# Patient Record
Sex: FEMALE | Race: WHITE
[De-identification: names, ages, dates, MRNs, and addresses within clinical notes are randomized per-mention and may not be internally consistent; named-entity substitution may affect disease eponyms.]

---

## 2019-12-31 ENCOUNTER — HOSPITAL ENCOUNTER (EMERGENCY)
Dept: HOSPITAL 7 - FB.ED | Age: 84
Discharge: HOME | End: 2019-12-31
Payer: MEDICARE

## 2019-12-31 DIAGNOSIS — R79.1: ICD-10-CM
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
## 2019-12-31 DIAGNOSIS — N28.9: ICD-10-CM

## 2019-12-31 DIAGNOSIS — S29.011A: Primary | ICD-10-CM

## 2019-12-31 DIAGNOSIS — Z79.899: ICD-10-CM

## 2019-12-31 DIAGNOSIS — R79.89: ICD-10-CM

## 2019-12-31 DIAGNOSIS — R79.82: ICD-10-CM

## 2019-12-31 NOTE — EDM.PDOC
ED HPI GENERAL MEDICAL PROBLEM





- General


Stated Complaint: POSS PNEUMONIA


Time Seen by Provider: 12/31/19 13:14


Source of Information: Reports: Patient


History Limitations: Reports: No Limitations





- History of Present Illness


INITIAL COMMENTS - FREE TEXT/NARRATIVE: 





c/o L rib pain





pt with nonproductive cough x 2d, no fever, had L rib pain today, thinks she 

may have pneumonia





h/o pneumonia x 5, last time 3y ago





visiting RN came to house today and heard crackles at [place] (none here) and 

recommended she come to ED, no temp at home





pt says she has "serious" heart problems, cannot name them, says she is on 4 

cardiac meds





does not get flu vax, never had Pneumovax





never smoked,  a smoker





lives alone, , in [address], here with dtr


  ** L lat chest, rib/back


Pain Score (Numeric/FACES): 4





- Related Data


 Allergies











Allergy/AdvReac Type Severity Reaction Status Date / Time


 


No Known Allergies Allergy   Verified 08/03/17 10:17











Home Meds: 


 Home Meds





Acetaminophen [Tylenol Extra Strength] 500 mg PO BID 12/31/19 [History]


Ascorbic Acid [Vitamin C] 1,000 mg PO BEDTIME 12/31/19 [History]


Cholecalciferol (Vitamin D3) [Vitamin D3] 400 unit PO BEDTIME 12/31/19 [History]


Clindamycin HCl [Cleocin] 600 mg PO ASDIRECTED PRN 12/31/19 [History]


Docusate Sodium 100 mg PO BID PRN 12/31/19 [History]


Fluocinolone Acetonide 4 drop TOP ASDIRECTED PRN 12/31/19 [History]


Furosemide [Lasix] 40 mg PO DAILY 12/31/19 [History]


Isosorbide Mononitrate [Imdur] 30 mg PO BID 12/31/19 [History]


Lisinopril [Zestril] 10 mg PO DAILY 12/31/19 [History]


Metoprolol Succinate [Toprol XL 100mg] 100 mg PO BEDTIME 12/31/19 [History]


Multivitamin [Daily Daniel] 1 tab PO DAILY 12/31/19 [History]


Nitrofurantoin Macrocrystal [Macrodantin] 50 mg PO MOWEFR@2100 12/31/19 [History

]


Nitroglycerin 0.4 mg SL Q5M PRN 12/31/19 [History]


Peppermint Oil Cap 1 cap PO BEDTIME 12/31/19 [History]


Spironolactone [Aldactone] 12.5 mg PO DAILY 12/31/19 [History]


Triamcinolone Acetonide [Triamcinolone Acetonide 0.1% Crm] 1 applic TOP BID PRN 

12/31/19 [History]


Ubidecarenone [Coenzyme Q10] 100 mg PO QPM 12/31/19 [History]


Warfarin [Coumadin] 2.5 mg PO FR 12/31/19 [History]


Warfarin [Coumadin] 5 mg PO SUMOTUWETHSA 12/31/19 [History]











ED ROS GENERAL





- Review of Systems


Review Of Systems: See Below


Constitutional: Reports: No Symptoms


HEENT: Reports: No Symptoms


Respiratory: Reports: Shortness of Breath, Cough


Cardiovascular: Reports: Other (L rib pain)


Endocrine: Reports: No Symptoms


GI/Abdominal: Reports: No Symptoms


: Reports: No Symptoms


Musculoskeletal: Reports: No Symptoms


Skin: Reports: No Symptoms


Neurological: Reports: No Symptoms


Psychiatric: Reports: No Symptoms


Hematologic/Lymphatic: Reports: No Symptoms


Immunologic: Reports: No Symptoms





ED EXAM, GENERAL





- Physical Exam


Exam: See Below


Exam Limited By: No Limitations


General Appearance: Alert, WD/WN, No Apparent Distress, Other (alert, conversant

, talkative, nonill, no cough, no dyspnea)


Eye Exam: Bilateral Eye: EOMI


Ears: Normal External Exam, Hearing Grossly Normal


Nose: Normal Inspection, Normal Mucosa, No Blood


Throat/Mouth: Normal Inspection, Normal Lips, Normal Voice, No Airway Compromise


Head: Atraumatic, Normocephalic


Neck: Normal Inspection, Supple, Non-Tender, Full Range of Motion.  No: 

Lymphadenopathy (R), Lymphadenopathy (L)


Respiratory/Chest: No Respiratory Distress, Lungs Clear, Normal Breath Sounds, 

No Accessory Muscle Use, Chest Non-Tender, Other (no definite tender at rib 10 

in L AAL where pt indicates discomfort, no rales there either)


Cardiovascular: Normal Peripheral Pulses, Regular Rate, Rhythm, No Edema, No 

Gallop, No JVD, No Murmur, No Rub, JVD


GI/Abdominal: Normal Bowel Sounds, Soft, Non-Tender, No Distention


Back Exam: Normal Inspection, Full Range of Motion.  No: CVA Tenderness (R), 

CVA Tenderness (L)


Extremities: Normal Inspection, Normal Range of Motion, Non-Tender, No Pedal 

Edema, Normal Capillary Refill


Neurological: Alert, Oriented, CN II-XII Intact, Normal Cognition, No Motor/

Sensory Deficits


Psychiatric: Normal Affect, Normal Mood


Skin Exam: Warm, Dry, Intact, Normal Color, No Rash


Lymphatic: No Adenopathy





Course





- Vital Signs


Last Recorded V/S: 


 Last Vital Signs











Temp  36.6 C   12/31/19 15:20


 


Pulse  87   12/31/19 15:20


 


Resp  18   12/31/19 15:20


 


BP  121/76   12/31/19 15:20


 


Pulse Ox  98   12/31/19 15:20














- Orders/Labs/Meds


Orders: 


 Active Orders 24 hr











 Category Date Time Status


 


 EKG Documentation Completion [RC] ASDIRECTED Care  12/31/19 13:12 Active


 


 Abdomen 2V AP Flat Upright [CR] Stat Exams  12/31/19 13:10 Taken


 


 Chest 2V [CR] Stat Exams  12/31/19 13:10 Taken


 


 EKG 12 Lead [EK] Routine Ther  12/31/19 13:10 Ordered











Labs: 


 Laboratory Tests











  12/31/19 12/31/19 12/31/19 Range/Units





  13:25 13:25 13:25 


 


WBC  5.2    (4.5-12.0)  X10-3/uL


 


RBC  3.91    (3.23-5.20)  x10(6)uL


 


Hgb  12.6    (11.5-15.5)  g/dL


 


Hct  37.1    (30.0-51.3)  %


 


MCV  95.0    (80-96)  fL


 


MCH  32.2    (27.7-33.6)  pg


 


MCHC  33.8    (32.2-35.4)  g/dL


 


RDW  12.0    (11.5-15.5)  %


 


Plt Count  170    (125-369)  X10(3)uL


 


MPV  9.6    (7.4-10.4)  fL


 


Neut % (Auto)  57.9    (46-82)  %


 


Lymph % (Auto)  25.3    (13-37)  %


 


Mono % (Auto)  11.1    (4-12)  %


 


Eos % (Auto)  4    (1.0-5.0)  %


 


Baso % (Auto)  2    (0-2)  %


 


Neut # (Auto)  3.0    (1.6-8.3)  #


 


Lymph # (Auto)  1.3    (0.6-5.0)  #


 


Mono # (Auto)  0.6    (0.0-1.3)  #


 


Eos # (Auto)  0.2    (0.0-0.8)  #


 


Baso # (Auto)  0.1    (0.0-0.2)  #


 


PT     (8.7-11.1)  


 


INR     (0.89-1.13)  


 


Sodium   131 L   (135-145)  mmol/L


 


Potassium   5.5 H   (3.5-5.3)  mmol/L


 


Chloride   92 L D   (100-110)  mmol/L


 


Carbon Dioxide   33 H   (21-32)  mmol/L


 


BUN   36 H D   (7-18)  mg/dL


 


Creatinine   1.5 H   (0.55-1.02)  mg/dL


 


Est Cr Clr Drug Dosing   TNP   


 


Estimated GFR (MDRD)   33 L   (>60)  


 


BUN/Creatinine Ratio   24.0 H   (9-20)  


 


Glucose   170 H   ()  mg/dL


 


Calcium   9.4   (8.6-10.2)  mg/dL


 


Total Bilirubin   0.4   (0.1-1.3)  mg/dL


 


AST   17   (5-25)  IU/L


 


ALT   17   (12-36)  U/L


 


Alkaline Phosphatase   56   ()  IU/L


 


Troponin I    < 0.017 L  (<0.017-0.056)  ng/mL


 


C-Reactive Protein    2.4 H  (0.5-0.9)  mg/dL


 


NT-Pro-B Natriuret Pep    3894 H*  (<=450)  pg/mL


 


Total Protein   7.5   (6.0-8.0)  g/dL


 


Albumin   3.7   (3.2-4.6)  g/dL


 


Globulin   3.8   g/dL


 


Albumin/Globulin Ratio   1.0   


 


Lipase     ()  U/L


 


Urine Color     (YELLOW)  


 


Urine Appearance     (CLEAR)  


 


Urine pH     (5.0-6.5)  


 


Ur Specific Gravity     (1.010-1.025)  


 


Urine Protein     (NEGATIVE)  mg/dL


 


Urine Glucose (UA)     (NORMAL)  mg/dL


 


Urine Ketones     (NEGATIVE)  mg/dL


 


Urine Occult Blood     (NEGATIVE)  


 


Urine Nitrite     (NEGATIVE)  


 


Urine Bilirubin     (NEGATIVE)  


 


Urine Urobilinogen     (NEGATIVE)  mg/dL


 


Ur Leukocyte Esterase     (NEGATIVE)  


 


Urine RBC     (0-5)  


 


Urine WBC     (0-5)  


 


Ur Squamous Epith Cells     (NS,R,O)  


 


Urine Bacteria     (NS)  














  12/31/19 12/31/19 12/31/19 Range/Units





  13:25 13:25 15:12 


 


WBC     (4.5-12.0)  X10-3/uL


 


RBC     (3.23-5.20)  x10(6)uL


 


Hgb     (11.5-15.5)  g/dL


 


Hct     (30.0-51.3)  %


 


MCV     (80-96)  fL


 


MCH     (27.7-33.6)  pg


 


MCHC     (32.2-35.4)  g/dL


 


RDW     (11.5-15.5)  %


 


Plt Count     (125-369)  X10(3)uL


 


MPV     (7.4-10.4)  fL


 


Neut % (Auto)     (46-82)  %


 


Lymph % (Auto)     (13-37)  %


 


Mono % (Auto)     (4-12)  %


 


Eos % (Auto)     (1.0-5.0)  %


 


Baso % (Auto)     (0-2)  %


 


Neut # (Auto)     (1.6-8.3)  #


 


Lymph # (Auto)     (0.6-5.0)  #


 


Mono # (Auto)     (0.0-1.3)  #


 


Eos # (Auto)     (0.0-0.8)  #


 


Baso # (Auto)     (0.0-0.2)  #


 


PT   51.5 H*   (8.7-11.1)  


 


INR   5.40 H*   (0.89-1.13)  


 


Sodium     (135-145)  mmol/L


 


Potassium     (3.5-5.3)  mmol/L


 


Chloride     (100-110)  mmol/L


 


Carbon Dioxide     (21-32)  mmol/L


 


BUN     (7-18)  mg/dL


 


Creatinine     (0.55-1.02)  mg/dL


 


Est Cr Clr Drug Dosing     


 


Estimated GFR (MDRD)     (>60)  


 


BUN/Creatinine Ratio     (9-20)  


 


Glucose     ()  mg/dL


 


Calcium     (8.6-10.2)  mg/dL


 


Total Bilirubin     (0.1-1.3)  mg/dL


 


AST     (5-25)  IU/L


 


ALT     (12-36)  U/L


 


Alkaline Phosphatase     ()  IU/L


 


Troponin I     (<0.017-0.056)  ng/mL


 


C-Reactive Protein     (0.5-0.9)  mg/dL


 


NT-Pro-B Natriuret Pep     (<=450)  pg/mL


 


Total Protein     (6.0-8.0)  g/dL


 


Albumin     (3.2-4.6)  g/dL


 


Globulin     g/dL


 


Albumin/Globulin Ratio     


 


Lipase  243    ()  U/L


 


Urine Color    Yellow  (YELLOW)  


 


Urine Appearance    Clear  (CLEAR)  


 


Urine pH    7.0 H  (5.0-6.5)  


 


Ur Specific Gravity    1.010  (1.010-1.025)  


 


Urine Protein    Negative  (NEGATIVE)  mg/dL


 


Urine Glucose (UA)    Normal  (NORMAL)  mg/dL


 


Urine Ketones    Negative  (NEGATIVE)  mg/dL


 


Urine Occult Blood    Negative  (NEGATIVE)  


 


Urine Nitrite    Negative  (NEGATIVE)  


 


Urine Bilirubin    Negative  (NEGATIVE)  


 


Urine Urobilinogen    Normal  (NEGATIVE)  mg/dL


 


Ur Leukocyte Esterase    Negative  (NEGATIVE)  


 


Urine RBC    0-5  (0-5)  


 


Urine WBC    0-5  (0-5)  


 


Ur Squamous Epith Cells    Few H  (NS,R,O)  


 


Urine Bacteria    Rare H  (NS)  














- Re-Assessments/Exams


Free Text/Narrative Re-Assessment/Exam: 





12/31/19 16:31


exam unremarkable, pt with new inc'd BUN/creat c/w dehydration, however pt 

states her weight has not changed (which is somewhat doubtful where this is true

)





on 2 diuretics, will stop the furosemide for 2d and continue the spironolactone





also on lisinopril which may need to be adjusted altho further adjustments in 

diuretics may be sufficient pending recommendations of PCP





PCP Dr Whitney is out of office for 5d, d/w Dr Harley who left a note on the 

desk of Dr Devonte Munoz RN offered to make appointment with Dr Whitney in 5-6d, however pt declined 

saying "I just call him up whenever I want"





I offered a cough suppressant, however pt declined that as well





mild increase of CRP of uncertain clinical sig, no clinical evidence of 

infection





Departure





- Departure


Time of Disposition: 16:23


Disposition: Home, Self-Care 01


Condition: Good


Clinical Impression: 


 Chest wall muscle strain, Acute renal insufficiency, Elevated brain 

natriuretic peptide (BNP) level, Elevated INR, Elevated C-reactive protein








- Discharge Information


*PRESCRIPTION DRUG MONITORING PROGRAM REVIEWED*: Not Applicable


*COPY OF PRESCRIPTION DRUG MONITORING REPORT IN PATIENT RANJIT: Not Applicable


Referrals: 


Goldy Whitney MD [Primary Care Provider] - 


Additional Instructions: 


Stop the furosemide for 2 days.





Use heat for 10 minutes to chest wall every 2 hours as needed while awake.





Take acetaminophen 500 mg 2 tabs 4 times a day for 5 days.





See Dr Whitney in 5-6 days.





Return to Emergency Department if feeling worse.





Sepsis Event Note





- Focused Exam


Vital Signs: 


 Vital Signs











  Temp Pulse Resp BP Pulse Ox


 


 12/31/19 15:20  36.6 C  87  18  121/76  98


 


 12/31/19 14:00   79  20  139/95 H  97


 


 12/31/19 13:30  36.3 C  83  24 H  152/109 H  100











Date Exam was Performed: 12/31/19


Time Exam was Performed: 16:23





- My Orders


Last 24 Hours: 


My Active Orders





12/31/19 13:10


Abdomen 2V AP Flat Upright [CR] Stat 


Chest 2V [CR] Stat 


EKG 12 Lead [EK] Routine 





12/31/19 13:12


EKG Documentation Completion [RC] ASDIRECTED 














- Assessment/Plan


Last 24 Hours: 


My Active Orders





12/31/19 13:10


Abdomen 2V AP Flat Upright [CR] Stat 


Chest 2V [CR] Stat 


EKG 12 Lead [EK] Routine 





12/31/19 13:12


EKG Documentation Completion [RC] ASDIRECTED

## 2020-08-12 ENCOUNTER — HOSPITAL ENCOUNTER (INPATIENT)
Dept: HOSPITAL 7 - FB.ED | Age: 85
LOS: 5 days | Discharge: NURSING, REHAB OR LONG-TERM CARE | DRG: 391 | End: 2020-08-17
Attending: FAMILY MEDICINE | Admitting: FAMILY MEDICINE
Payer: MEDICARE

## 2020-08-12 DIAGNOSIS — Z88.0: ICD-10-CM

## 2020-08-12 DIAGNOSIS — K57.92: ICD-10-CM

## 2020-08-12 DIAGNOSIS — F41.9: ICD-10-CM

## 2020-08-12 DIAGNOSIS — I50.23: ICD-10-CM

## 2020-08-12 DIAGNOSIS — Z98.42: ICD-10-CM

## 2020-08-12 DIAGNOSIS — Z79.01: ICD-10-CM

## 2020-08-12 DIAGNOSIS — Z90.710: ICD-10-CM

## 2020-08-12 DIAGNOSIS — I48.91: ICD-10-CM

## 2020-08-12 DIAGNOSIS — Z79.899: ICD-10-CM

## 2020-08-12 DIAGNOSIS — R07.9: ICD-10-CM

## 2020-08-12 DIAGNOSIS — N28.9: ICD-10-CM

## 2020-08-12 DIAGNOSIS — K59.00: ICD-10-CM

## 2020-08-12 DIAGNOSIS — Z98.41: ICD-10-CM

## 2020-08-12 DIAGNOSIS — Z95.5: ICD-10-CM

## 2020-08-12 DIAGNOSIS — H35.30: ICD-10-CM

## 2020-08-12 DIAGNOSIS — M19.90: ICD-10-CM

## 2020-08-12 DIAGNOSIS — F32.9: ICD-10-CM

## 2020-08-12 DIAGNOSIS — G20: ICD-10-CM

## 2020-08-12 DIAGNOSIS — Z88.2: ICD-10-CM

## 2020-08-12 DIAGNOSIS — E87.6: ICD-10-CM

## 2020-08-12 DIAGNOSIS — I20.9: ICD-10-CM

## 2020-08-12 DIAGNOSIS — Z87.01: ICD-10-CM

## 2020-08-12 DIAGNOSIS — I25.5: ICD-10-CM

## 2020-08-12 DIAGNOSIS — E66.9: ICD-10-CM

## 2020-08-12 DIAGNOSIS — H40.9: ICD-10-CM

## 2020-08-12 DIAGNOSIS — I69.354: ICD-10-CM

## 2020-08-12 DIAGNOSIS — I11.0: ICD-10-CM

## 2020-08-12 DIAGNOSIS — I25.2: ICD-10-CM

## 2020-08-12 DIAGNOSIS — E11.9: ICD-10-CM

## 2020-08-12 DIAGNOSIS — I50.9: ICD-10-CM

## 2020-08-12 DIAGNOSIS — K57.32: Primary | ICD-10-CM

## 2020-08-12 PROCEDURE — 36415 COLL VENOUS BLD VENIPUNCTURE: CPT

## 2020-08-12 PROCEDURE — 74176 CT ABD & PELVIS W/O CONTRAST: CPT

## 2020-08-12 PROCEDURE — 93005 ELECTROCARDIOGRAM TRACING: CPT

## 2020-08-12 PROCEDURE — 96375 TX/PRO/DX INJ NEW DRUG ADDON: CPT

## 2020-08-12 PROCEDURE — 81001 URINALYSIS AUTO W/SCOPE: CPT

## 2020-08-12 PROCEDURE — 85610 PROTHROMBIN TIME: CPT

## 2020-08-12 PROCEDURE — 80053 COMPREHEN METABOLIC PANEL: CPT

## 2020-08-12 PROCEDURE — 83880 ASSAY OF NATRIURETIC PEPTIDE: CPT

## 2020-08-12 PROCEDURE — 99285 EMERGENCY DEPT VISIT HI MDM: CPT

## 2020-08-12 PROCEDURE — 96374 THER/PROPH/DIAG INJ IV PUSH: CPT

## 2020-08-12 PROCEDURE — 85025 COMPLETE CBC W/AUTO DIFF WBC: CPT

## 2020-08-12 PROCEDURE — 84484 ASSAY OF TROPONIN QUANT: CPT

## 2020-08-12 PROCEDURE — 71045 X-RAY EXAM CHEST 1 VIEW: CPT

## 2020-08-12 RX ADMIN — METOPROLOL SUCCINATE SCH MG: 100 TABLET, FILM COATED, EXTENDED RELEASE ORAL at 20:29

## 2020-08-12 RX ADMIN — METRONIDAZOLE SCH MLS/HR: 500 SOLUTION INTRAVENOUS at 19:19

## 2020-08-12 NOTE — CT
INDICATION:  Left lower and right lower quadrant pain.  



CT ABDOMEN AND PELVIS WITHOUT IV CONTRAST BUT WITH ORAL CONTRAST:  Spiral 3.75 
mm axial sections were obtained through the abdomen with oral contrast only 
08/12/20 and compared with 08/03/17 CT abdomen and pelvis with oral and IV 
contrast.  



The  heart is enlarged.  No pericardial effusion was seen.  



There are bilateral pleural effusions, large on the right, moderate on the left 
with heavy markings at the lung bases which may be on the basis of pneumonia and
pleuritis, but could be related to CHF with pleural effusions - correlate 
clinically.  



The liver and spleen were unremarkable.  

The gallbladder is absent compatible with history of its removal.  

The adrenal glands appeared grossly normal.  

The pancreas is very minimal in size as previously.  The common bile duct was 
not enlarged.  



Calcifications are noted in the abdominal aorta, splenic artery, iliac and 
femoral arteries.  



There is mild renal fascial thickening with some irregularity of the renal 
cortices compatible with renal cortical scarring.  No evidence of obstructive 
uropathy was identified.  No definite mass of the kidneys was identified.  



No retroperitoneal masses were demonstrated.  



The appendix and uterus are absent compatible with their resections.  



Diverticulosis of the descending, to a lesser extent transverse, and to a much 
greater extent sigmoid colon is noted without definitive evidence of 
diverticulitis.  



No evidence of free air or bowel obstruction was seen.  



The urinary bladder appeared grossly normal.  



There is again noted some mild thickening of the gastric antral wall of 
questionable significance.  



Small umbilical hernia including only fat is again noted.  



The patient's right and left lower abdominal pain is not definitely explained, 
although a mild or early diverticulitis is difficult to entirely exclude with 
this appearance.  



A mild-to-moderate dextroconcave scoliosis of the lumbar spine is again noted 
with hypertrophic degenerative changes and disk disease at L2-3, L4-5, L5-S1 
again noted.  There is some progression of hypertrophic changes at the L2-3 
level.  



IMPRESSION: 

1.  Diverticulosis coli most notable at the sigmoid, but without definite 
evidence of diverticulitis.  A mild or early diverticulitis is difficult to 
entirely exclude, especially in the area of the sigmoid colon.  

2.  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 

3.  ASD. 

4.  ASHD with possible CHF and bilateral pleural effusions, question bibasilar 
pneumonia and pleuritis additionally. 

5.  Post hysterectomy, appendectomy, cholecystectomy.  

6.  Continued appearance of thickening of the wall of the gastric antrum of 
questionable significance.  



Report was called to Dr. Khanna at 1515 hours.  



Total exam DLP was 1449.54 mGy-cm.  

MTDD

## 2020-08-12 NOTE — EDM.PDOC
ED HPI GENERAL MEDICAL PROBLEM





- General


Chief Complaint: Cardiovascular Problem


Time Seen by Provider: 20 14:55


Source of Information: Reports: Patient


History Limitations: Reports: No Limitations





- History of Present Illness


INITIAL COMMENTS - FREE TEXT/NARRATIVE: 





Patient presented to the ED because of chest pain,nausea and palpitations for 

the past 4 days. She also c/o LLQ and RLQ pain,6/10, with  occasional nausea but

no vomiting and she is constipated for 2 days now. There is no cough/cold 

symptoms, no fever or chills. 


  ** left chest


Pain Score (Numeric/FACES): 6





  ** Abdomen


Pain Score (Numeric/FACES): 5





  ** Back


Pain Score (Numeric/FACES): 5





- Related Data


                                    Allergies











Allergy/AdvReac Type Severity Reaction Status Date / Time


 


Penicillins Allergy  Rash Verified 20 19:22


 


Sulfa (Sulfonamide Allergy  Rash Verified 20 19:22





Antibiotics)     











Home Meds: 


                                    Home Meds





Acetaminophen [Tylenol Extra Strength] 500 mg PO BID 19 [History]


Docusate Sodium 100 mg PO BID PRN 19 [History]


Fluocinolone Acetonide 4 drop TOP ASDIRECTED PRN 19 [History]


Furosemide [Lasix] 40 mg PO DAILY 19 [History]


Isosorbide Mononitrate [Imdur] 90 mg PO QAM 19 [History]


Metoprolol Succinate [Toprol XL 100mg] 100 mg PO BEDTIME 19 [History]


Multivitamin [Daily Daniel] 1 tab PO DAILY 19 [History]


Nitroglycerin 0.4 mg SL Q5M PRN 19 [History]


Peppermint Oil Cap 1 cap PO BEDTIME 19 [History]


Triamcinolone Acetonide [Triamcinolone Acetonide 0.1% Crm] 1 applic TOP BID PRN 

19 [History]


Ubidecarenone [Coenzyme Q10] 100 mg PO DAILY 19 [History]


Warfarin [Coumadin] 5 mg PO QAM 19 [History]


lisinopriL [Zestril] 10 mg PO DAILY 19 [History]


nitrofurantoin macrocrystaL [Macrodantin] 50 mg PO MOWEFR@2100 12/31/19 

[History]


Mv-Mn/Iron/Folic Acid/Herb 190 [Vitamin D3 Complete Caplet] 1 tab PO DAILY 

20 [History]











Past Medical History


HEENT History: Reports: Glaucoma, Macular Degeneration


Cardiovascular History: Reports: Afib, Heart Failure, Hypertension, MI, Stents


Respiratory History: Reports: Pneumonia, Recurrent


Gastrointestinal History: Reports: Diverticulosis


Genitourinary History: Reports: None


OB/GYN History: Reports: Pregnancy


Other OB/GYN History: 


Musculoskeletal History: Reports: Arthritis


Neurological History: Reports: Concussion, CVA, Migraines, Parkinson's, Vertigo


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


Psychiatric History: Reports: Anxiety, Depression


Endocrine/Metabolic History: Reports: Diabetes, Type II, Obesity/BMI 30+


Hematologic History: Reports: Anticoagulation Therapy





- Infectious Disease History


Infectious Disease History: Reports: Shingles





- Past Surgical History


HEENT Surgical History: Reports: Cataract Surgery


Other HEENT Surgeries/Procedures: bilat cataract


Cardiovascular Surgical History: Reports: Coronary Artery Stent


GI Surgical History: Reports: Colonoscopy, Other (See Below)


Other GI Surgeries/Procedures: exp lap


Female  Surgical History: Reports: Hysterectomy





Social & Family History





- Family History


Family Medical History: Noncontributory





- Caffeine Use


Caffeine Use: Reports: Coffee, Soda





ED ROS GENERAL





- Review of Systems


Review Of Systems: See Below


Constitutional: Reports: No Symptoms


HEENT: Reports: No Symptoms


Respiratory: Reports: No Symptoms


Cardiovascular: Reports: Chest Pain, Palpitations.  Denies: Lightheadedness, 

Orthopnea


Endocrine: Reports: No Symptoms


GI/Abdominal: Reports: Constipation, Nausea.  Denies: Diarrhea, Vomiting


: Reports: No Symptoms


Musculoskeletal: Reports: No Symptoms


Skin: Reports: No Symptoms


Neurological: Reports: No Symptoms


Psychiatric: Reports: No Symptoms





ED EXAM, GENERAL





- Physical Exam


Exam: See Below


Exam Limited By: No Limitations


General Appearance: Alert, No Apparent Distress


Eye Exam: Bilateral Eye: PERRL


Ears: Normal External Exam


Nose: Normal Inspection


Throat/Mouth: Normal Inspection


Head: Atraumatic, Normocephalic


Neck: Normal Inspection, Supple, Non-Tender


Respiratory/Chest: No Respiratory Distress, Lungs Clear, Normal Breath Sounds


Cardiovascular: Normal Peripheral Pulses, Regular Rate, Rhythm, No Edema, No 

Gallop, No JVD, No Murmur


Back Exam: Normal Inspection, Full Range of Motion


Extremities: Normal Inspection, Normal Range of Motion, Non-Tender


Neurological: Alert, Oriented, CN II-XII Intact, Normal Cognition, Normal Gait, 

Normal Reflexes, No Motor/Sensory Deficits


Psychiatric: Normal Affect, Normal Mood





Course





- Vital Signs


Text/Narrative:: 





Labs/EKG/CXR/Ct abs/pelvis was discussed with patient and verbalized full 

understanding


EKG-AFIB/A FLUTTER with RVR


Cardizem 25 mg IV x1


Morphine 2 mg IV x1


Lasix 40 mg IV


Zaroxolyn 2.5 mg po x1


Rocephine 1 gm IV x1


Flagyl 500 mg IV x1


Last Recorded V/S: 


                                Last Vital Signs











Temp  36.4 C   20 00:00


 


Pulse  60   20 00:00


 


Resp  18   20 00:00


 


BP  135/87   20 00:00


 


Pulse Ox  99   20 00:00














- Orders/Labs/Meds


Orders: 


                               Active Orders 24 hr











 Category Date Time Status


 


 Patient Status [ADT] Routine ADT  20 18:05 Active


 


 Cardiac Monitoring [RC] CONTINUOUS Care  20 18:11 Active


 


 Intake and Output [RC] 06,14,22 Care  20 18:11 Active


 


 Oxygen Therapy [RC] PRN Care  20 18:05 Active


 


 Pulse Oximetry [RC] CONTINUOUS Care  20 18:11 Active


 


 Vital Signs [RC] 00,04,08,12,16,20 Care  20 18:05 Active


 


 Heart Healthy Diet [DIET] Diet  20 Dinner Ordered


 


 BASIC METABOLIC PANEL,BMP [CHEM] AM Lab  20 05:11 Ordered


 


 CBC WITH AUTO DIFF [HEME] AM Lab  20 05:11 Ordered


 


 INR,PT,PROTHROMBIN TIME [COAG] AM Lab  20 05:11 Ordered


 


 Ondansetron [Zofran] Med  20 18:04 Active





 4 mg IV Q4H PRN   


 


 Sodium Chloride 0.9% [Saline Flush] Med  20 13:13 Active





 10 ml FLUSH ASDIRECTED PRN   


 


 bisacodyL [Dulcolax] Med  20 18:04 Active





 5 mg PO DAILY PRN   


 


 Saline Lock Insert [OM.PC] Routine Oth  20 13:13 Ordered


 


 Resuscitation Status Routine Resus Stat  20 18:04 Ordered


 


 EKG 12 Lead [EK] Routine Ther  20 12:19 Ordered








                                Medication Orders





Acetaminophen (Tylenol Extra Strength)  500 mg PO BID [facility]


   Last Admin: 20 20:29  Dose: 500 mg


   Documented by: MAGDA


Ascorbic Acid (Vitamin C)  1,000 mg PO BEDTIME [facility]


   Last Admin: 20 20:28  Dose: 1,000 mg


   Documented by: MAGDA


Bisacodyl (Dulcolax)  5 mg PO DAILY PRN


   PRN Reason: Constipation


Ceftriaxone Sodium (Rocephin)  1 gm IVPUSH Q24H [facility]


   Last Admin: 20 19:19  Dose: 1 gm


   Documented by: KOBE


Coenzyme Q10 (Coenzyme Q10)  100 mg PO QPM KWABENA


Docusate Sodium (Colace)  100 mg PO BID PRN


   PRN Reason: Constipation


Furosemide (Lasix)  40 mg IVPUSH BID [facility]


   Last Admin: 20 19:19  Dose: 40 mg


   Documented by: KOBE


Metronidazole 500 mg/ Premix  100 mls @ 100 mls/hr IV Q8H [facility]


   Last Admin: 20 19:19  Dose: 100 mls/hr


   Documented by: KOBE


Isosorbide Mononitrate (Imdur)  30 mg PO BID [facility]


Metoprolol Succinate (Toprol Xl)  100 mg PO BEDTIME [facility]


   Last Admin: 20 20:29  Dose: 100 mg


   Documented by: MAGDA


Multivitamins/Minerals/Vitamin C (Tab-A-Daniel)  1 tab PO DAILY [facility]


Nitroglycerin (Nitrostat)  0.4 mg SL Q5M PRN


   PRN Reason: Chest Pain


Non-Formulary Medication (Cholecalciferol (Vitamin D3) [Vitamin D3])  400 unit 

PO BEDTIME KWABENA


Non-Formulary Medication (Fluocinolone Acetonide [Fluocinolone Acetonide])  4 

drop TOP ASDIRECTED PRN


   PRN Reason: Dizziness


Non-Formulary Medication (Peppermint Oil Cap)  1 cap PO BEDTIME [facility]


Ondansetron HCl (Zofran)  4 mg IV Q4H PRN


   PRN Reason: Nausea/Vomiting


Sodium Chloride (Saline Flush)  10 ml FLUSH ASDIRECTED PRN


   PRN Reason: Keep Vein Open


Spironolactone (Aldactone)  12.5 mg PO DAILY KWABENA


Triamcinolone Acetonide (Triamcinolone Acetonide 0.1% Crm)  0 gm TOP BID PRN


   PRN Reason: Rash


Warfarin Sodium (Coumadin)  2.5 mg PO FR KWABENA


Warfarin Sodium (Coumadin)  5 mg PO SUMOTUWETHSA [facility]








Labs: 


                                Laboratory Tests











  20 Range/Units





  12:30 12:30 12:30 


 


WBC  6.3    (4.5-12.0)  X10-3/uL


 


RBC  3.86    (3.23-5.20)  x10(6)uL


 


Hgb  12.2    (11.5-15.5)  g/dL


 


Hct  37.2    (30.0-51.3)  %


 


MCV  96.3 H    (80-96)  fL


 


MCH  31.6    (27.7-33.6)  pg


 


MCHC  32.8    (32.2-35.4)  g/dL


 


RDW  12.3    (11.5-15.5)  %


 


Plt Count  156    (125-369)  X10(3)uL


 


MPV  10.1    (7.4-10.4)  fL


 


Neut % (Auto)  72.4    (46-82)  %


 


Lymph % (Auto)  15.4    (13-37)  %


 


Mono % (Auto)  8.4    (4-12)  %


 


Eos % (Auto)  2    (1.0-5.0)  %


 


Baso % (Auto)  2    (0-2)  %


 


Neut # (Auto)  4.6    (1.6-8.3)  #


 


Lymph # (Auto)  1.0    (0.6-5.0)  #


 


Mono # (Auto)  0.5    (0.0-1.3)  #


 


Eos # (Auto)  0.1    (0.0-0.8)  #


 


Baso # (Auto)  0.1    (0.0-0.2)  #


 


PT   32.1 H   (9.0-11.1)  sec


 


INR   3.21 H   (1.00-1.24)  


 


Sodium    135  (135-145)  mmol/L


 


Potassium    4.5  D  (3.5-5.3)  mmol/L


 


Chloride    97 L D  (100-110)  mmol/L


 


Carbon Dioxide    28  (21-32)  mmol/L


 


BUN    15  D  (7-18)  mg/dL


 


Creatinine    1.3 H  (0.55-1.02)  mg/dL


 


Est Cr Clr Drug Dosing    TNP  


 


Estimated GFR (MDRD)    39 L  (>60)  


 


BUN/Creatinine Ratio    11.5  (9-20)  


 


Glucose    380 H D  ()  mg/dL


 


Calcium    8.7  (8.6-10.2)  mg/dL


 


Total Bilirubin    0.6  (0.1-1.3)  mg/dL


 


AST    21  D  (5-25)  IU/L


 


ALT    21  D  (12-36)  U/L


 


Alkaline Phosphatase    69  ()  IU/L


 


Troponin I     (4.0-60.3)  pg/mL


 


NT-Pro-B Natriuret Pep     (<=450)  pg/mL


 


Total Protein    7.2  (6.0-8.0)  g/dL


 


Albumin    3.5  (3.2-4.6)  g/dL


 


Globulin    3.7  g/dL


 


Albumin/Globulin Ratio    1.0  


 


Urine Color     (YELLOW)  


 


Urine Appearance     (CLEAR)  


 


Urine pH     (5.0-6.5)  


 


Ur Specific Gravity     (1.010-1.025)  


 


Urine Protein     (NEGATIVE)  mg/dL


 


Urine Glucose (UA)     (NORMAL)  mg/dL


 


Urine Ketones     (NEGATIVE)  mg/dL


 


Urine Occult Blood     (NEGATIVE)  


 


Urine Nitrite     (NEGATIVE)  


 


Urine Bilirubin     (NEGATIVE)  


 


Urine Urobilinogen     (NEGATIVE)  mg/dL


 


Ur Leukocyte Esterase     (NEGATIVE)  


 


Urine RBC     (0-5)  


 


Urine WBC     (0-5)  


 


Ur Squamous Epith Cells     (NS,R,O)  


 


Urine Bacteria     (NS)  














  20 Range/Units





  12:30 13:28 


 


WBC    (4.5-12.0)  X10-3/uL


 


RBC    (3.23-5.20)  x10(6)uL


 


Hgb    (11.5-15.5)  g/dL


 


Hct    (30.0-51.3)  %


 


MCV    (80-96)  fL


 


MCH    (27.7-33.6)  pg


 


MCHC    (32.2-35.4)  g/dL


 


RDW    (11.5-15.5)  %


 


Plt Count    (125-369)  X10(3)uL


 


MPV    (7.4-10.4)  fL


 


Neut % (Auto)    (46-82)  %


 


Lymph % (Auto)    (13-37)  %


 


Mono % (Auto)    (4-12)  %


 


Eos % (Auto)    (1.0-5.0)  %


 


Baso % (Auto)    (0-2)  %


 


Neut # (Auto)    (1.6-8.3)  #


 


Lymph # (Auto)    (0.6-5.0)  #


 


Mono # (Auto)    (0.0-1.3)  #


 


Eos # (Auto)    (0.0-0.8)  #


 


Baso # (Auto)    (0.0-0.2)  #


 


PT    (9.0-11.1)  sec


 


INR    (1.00-1.24)  


 


Sodium    (135-145)  mmol/L


 


Potassium    (3.5-5.3)  mmol/L


 


Chloride    (100-110)  mmol/L


 


Carbon Dioxide    (21-32)  mmol/L


 


BUN    (7-18)  mg/dL


 


Creatinine    (0.55-1.02)  mg/dL


 


Est Cr Clr Drug Dosing    


 


Estimated GFR (MDRD)    (>60)  


 


BUN/Creatinine Ratio    (9-20)  


 


Glucose    ()  mg/dL


 


Calcium    (8.6-10.2)  mg/dL


 


Total Bilirubin    (0.1-1.3)  mg/dL


 


AST    (5-25)  IU/L


 


ALT    (12-36)  U/L


 


Alkaline Phosphatase    ()  IU/L


 


Troponin I  28.7   (4.0-60.3)  pg/mL


 


NT-Pro-B Natriuret Pep  7980 H*   (<=450)  pg/mL


 


Total Protein    (6.0-8.0)  g/dL


 


Albumin    (3.2-4.6)  g/dL


 


Globulin    g/dL


 


Albumin/Globulin Ratio    


 


Urine Color   Yellow  (YELLOW)  


 


Urine Appearance   Clear  (CLEAR)  


 


Urine pH   5.0  (5.0-6.5)  


 


Ur Specific Gravity   1.015  (1.010-1.025)  


 


Urine Protein   Negative  (NEGATIVE)  mg/dL


 


Urine Glucose (UA)   >1000 H  (NORMAL)  mg/dL


 


Urine Ketones   Negative  (NEGATIVE)  mg/dL


 


Urine Occult Blood   Trace  (NEGATIVE)  


 


Urine Nitrite   Negative  (NEGATIVE)  


 


Urine Bilirubin   Negative  (NEGATIVE)  


 


Urine Urobilinogen   Normal  (NEGATIVE)  mg/dL


 


Ur Leukocyte Esterase   Negative  (NEGATIVE)  


 


Urine RBC   0-5  (0-5)  


 


Urine WBC   0-5  (0-5)  


 


Ur Squamous Epith Cells   Moderate H  (NS,R,O)  


 


Urine Bacteria   Moderate H  (NS)  











Meds: 


Medications











Generic Name Dose Route Start Last Admin





  Trade Name Freq  PRN Reason Stop Dose Admin


 


Acetaminophen  500 mg  20 21:00  20 20:29





  Tylenol Extra Strength  PO   500 mg





  BID KWABENA   Administration


 


Ascorbic Acid  1,000 mg  20 21:00  20 20:28





  Vitamin C  PO   1,000 mg





  BEDTIME KWABENA   Administration


 


Bisacodyl  5 mg  20 18:04 





  Dulcolax  PO  





  DAILY PRN  





  Constipation  


 


Ceftriaxone Sodium  1 gm  20 18:30  20 19:19





  Rocephin  IVPUSH   1 gm





  Q24H KWABENA   Administration


 


Coenzyme Q10  100 mg  20 17:00 





  Coenzyme Q10  PO  





  QPM KWABENA  


 


Docusate Sodium  100 mg  20 18:15 





  Colace  PO  





  BID PRN  





  Constipation  


 


Furosemide  40 mg  20 21:00  20 19:19





  Lasix  IVPUSH   40 mg





  BID [facility]   Administration


 


Metronidazole 500 mg/ Premix  100 mls @ 100 mls/hr  20 18:30  20 

19:19





  IV   100 mls/hr





  Q8H KWABENA   Administration


 


Isosorbide Mononitrate  30 mg  20 21:00 





  Imdur  PO  





  BID [facility]  


 


Metoprolol Succinate  100 mg  20 21:00  20 20:29





  Toprol Xl  PO   100 mg





  BEDTIME KWABENA   Administration


 


Multivitamins/Minerals/Vitamin C  1 tab  20 09:00 





  Tab-A-Daniel  PO  





  DAILY [facility]  


 


Nitroglycerin  0.4 mg  20 18:15 





  Nitrostat  SL  





  Q5M PRN  





  Chest Pain  


 


Non-Formulary Medication  400 unit  20 21:00 





  Cholecalciferol (Vitamin D3) [Vitamin D3]  PO  





  BEDTIME KWABENA  


 


Non-Formulary Medication  4 drop  20 18:15 





  Fluocinolone Acetonide [Fluocinolone Acetonide]  TOP  





  ASDIRECTED PRN  





  Dizziness  


 


Non-Formulary Medication  1 cap  20 21:00 





  Peppermint Oil Cap  PO  





  BEDTIME [facility]  


 


Ondansetron HCl  4 mg  20 18:04 





  Zofran  IV  





  Q4H PRN  





  Nausea/Vomiting  


 


Sodium Chloride  10 ml  20 13:13 





  Saline Flush  FLUSH  





  ASDIRECTED PRN  





  Keep Vein Open  


 


Spironolactone  12.5 mg  20 09:00 





  Aldactone  PO  





  DAILY KWABENA  


 


Triamcinolone Acetonide  0 gm  20 18:15 





  Triamcinolone Acetonide 0.1% Crm  TOP  





  BID PRN  





  Rash  


 


Warfarin Sodium  2.5 mg  20 18:15 





  Coumadin  PO  





  FR KWABENA  


 


Warfarin Sodium  5 mg  20 18:15 





  Coumadin  PO  





  SUMOTUWETHSA KWABENA  














Discontinued Medications














Generic Name Dose Route Start Last Admin





  Trade Name Freq  PRN Reason Stop Dose Admin


 


Aspirin  324 mg  20 14:25  20 14:54





  Aspirin  PO  20 14:26  324 mg





  ONETIME ONE   Administration


 


Diatrizoate Meglum/Diatrizoate Sod  30 ml  20 15:41  20 22:37





  Gastrografin 37%  PO  20 15:42  Not Given





  .AS DIRECTED ONE  


 


Diltiazem HCl  25 mg  20 14:25  20 14:48





  Diltiazem  IVPUSH  20 14:26  25 mg





  ONETIME ONE   Administration


 


Metolazone  2.5 mg  20 18:20  20 20:33





  Zaroxolyn  PO  20 18:21  2.5 mg





  ONETIME ONE   Administration


 


Metolazone  Confirm  20 20:31  20 20:34





  Zaroxolyn  Administered  20 20:32  Not Given





  Dose  





  2.5 mg  





  .ROUTE  





  .STK-MED ONE  


 


Morphine Sulfate  2 mg  20 14:26  20 14:48





  Morphine  IVPUSH  20 14:27  2 mg





  ONETIME ONE   Administration














Departure





- Departure


Time of Disposition: 16:00


Disposition: Admitted As Inpatient 66


Condition: Good


Clinical Impression: 


 Chest pain, Afib, Diverticulitis








- My Orders


Last 24 Hours: 


My Active Orders





20 12:19


EKG 12 Lead [EK] Routine 





20 13:13


Sodium Chloride 0.9% [Saline Flush]   10 ml FLUSH ASDIRECTED PRN 


Saline Lock Insert [OM.PC] Routine 





20 Dinner


Heart Healthy Diet [DIET] 





20 18:04


Ondansetron [Zofran]   4 mg IV Q4H PRN 


bisacodyL [Dulcolax]   5 mg PO DAILY PRN 


Resuscitation Status Routine 





20 18:05


Patient Status [ADT] Routine 


Oxygen Therapy [RC] PRN 


Vital Signs [RC] 00,04,08,12,16,20 





20 18:11


Cardiac Monitoring [RC] CONTINUOUS 


Intake and Output [RC] 06,14,22 


Pulse Oximetry [RC] CONTINUOUS 





20 05:11


BASIC METABOLIC PANEL,BMP [CHEM] AM 


CBC WITH AUTO DIFF [HEME] AM 


INR,PT,PROTHROMBIN TIME [COAG] AM 














- Assessment/Plan


Last 24 Hours: 


My Active Orders





20 12:19


EKG 12 Lead [EK] Routine 





20 13:13


Sodium Chloride 0.9% [Saline Flush]   10 ml FLUSH ASDIRECTED PRN 


Saline Lock Insert [OM.PC] Routine 





20 Dinner


Heart Healthy Diet [DIET] 





20 18:04


Ondansetron [Zofran]   4 mg IV Q4H PRN 


bisacodyL [Dulcolax]   5 mg PO DAILY PRN 


Resuscitation Status Routine 





20 18:05


Patient Status [ADT] Routine 


Oxygen Therapy [RC] PRN 


Vital Signs [RC] 00,04,08,12,16,20 





20 18:11


Cardiac Monitoring [RC] CONTINUOUS 


Intake and Output [RC] 06,14,22 


Pulse Oximetry [RC] CONTINUOUS 





20 05:11


BASIC METABOLIC PANEL,BMP [CHEM] AM 


CBC WITH AUTO DIFF [HEME] AM 


INR,PT,PROTHROMBIN TIME [COAG] AM

## 2020-08-12 NOTE — CR
INDICATION: Chest pain, Parkinson's, shortness of breath. 



CHEST ONE VIEW: A single portable AP upright view of the chest was obtained 
08/12/20 and compared with 03/08/11 and 12/31/19. 



The heart is enlarged, the aorta is tortuous with calcification. Overlying EKG 
leads and snaps are noted. Pulmonary vasculature appears slightly prominent 
raising question of a mild degree of CHF. 



Heavy markings at the lung bases due to a poor inspiration, at least in part, 
make it difficult to exclude minimal patchy bronchopneumonia. However, no 
consolidating pneumonia or definite effusion was seen. 



IMPRESSION:

1. ASHD with cardiomegaly with suggestion of mild or early CHF. 

2. Difficult to entirely exclude patchy bronchopneumonia at the lung bases. 

3. Exogenous obesity. 

MTDD

## 2020-08-13 RX ADMIN — Medication PRN ML: at 18:45

## 2020-08-13 RX ADMIN — METRONIDAZOLE SCH MLS/HR: 500 SOLUTION INTRAVENOUS at 02:41

## 2020-08-13 RX ADMIN — METOPROLOL SUCCINATE SCH MG: 100 TABLET, FILM COATED, EXTENDED RELEASE ORAL at 20:05

## 2020-08-13 RX ADMIN — Medication PRN ML: at 18:51

## 2020-08-13 RX ADMIN — METRONIDAZOLE SCH MLS/HR: 500 SOLUTION INTRAVENOUS at 10:49

## 2020-08-13 RX ADMIN — Medication PRN ML: at 20:10

## 2020-08-13 RX ADMIN — Medication PRN ML: at 02:41

## 2020-08-13 RX ADMIN — Medication PRN ML: at 19:50

## 2020-08-13 RX ADMIN — METRONIDAZOLE SCH MLS/HR: 500 SOLUTION INTRAVENOUS at 18:51

## 2020-08-13 NOTE — PCM.HP.2
H&P History of Present Illness





- General


Date of Service: 20


Admit Problem/Dx: 


                           Admission Diagnosis/Problem





Admission Diagnosis/Problem      Diverticulitis, Atrial fibrillation and 

flutter, CHF








Source of Information: Patient, EMS Notes Reviewed, Family, Old Records





- History of Present Illness


Initial Comments - Free Text/Narative: 


Sylwia was admitted last evening for 4 day history of chest pain with 

palpitations, shortness of breath. She had no fevers, chills, cough, nausea or 

diarrhea but had nausea, lower abdominal pain with constipation x 2 days. Had a 

diarrhea stool this morning after taking stool softener for constipation. Still 

having some palpitations but no chest pain. She saw Dr Patel, [facility] in regards to her Atrial fibrillation, CAD, CHF, on 6/15/2020, and 

she had called him for chest pain/shoulder pain on Aug 3rd, had instructions to 

increase her Imdur to 90 mg in am and 30 mg in the afternoon. She has been 

complaining of more weakness, not getting up as much per her sons, has [employer],

[facility], bath aide, housekeeping services through [facility]. Wanda Colin RN with [facility] send communication that feels patient needs nursing

home due to her declining health since March. Sylwia also states that she was 

put on a Parkinson's medication but only took 1 pill as it made her heart race. 

She states she has had tremors since February but her sons stated that she has 

had for a few years, has worsening this year, some days she doesn't seem to have

them. They do live locally but unsure how much they can help her if she would go

home. She does have oxygen at home, 3L/min. ER course: was given Cardizem IV for

Atrial fibrillation with RVR, heart rate now in 90s, morphine for pain, Lasix, 

Metolazone for CHF, Rocephin & Flagyl for Diverticulitis in the ER.  Had CT 

abdomen/pelvis showed diverticulosis questionable early diverticulitis. CXR 

showed mild CHF, questionable pneumonia in bases. WBC was normal. Cr 1.3 today 

1.2. BNP was 7980. INR was 3.21 and today 4.03. UA was negative for leukocyte 

esterase or nitrites but had >1000 glucose. 


  ** left chest


Pain Score (Numeric/FACES): 6





  ** Abdomen


Pain Score (Numeric/FACES): 5





  ** Back


Pain Score (Numeric/FACES): 5





- Related Data


Allergies/Adverse Reactions: 


                                    Allergies











Allergy/AdvReac Type Severity Reaction Status Date / Time


 


Penicillins Allergy  Rash Verified 20 19:22


 


Sulfa (Sulfonamide Allergy  Rash Verified 20 19:22





Antibiotics)     











Home Medications: 


                                    Home Meds





Acetaminophen [Tylenol Extra Strength] 500 mg PO DAILY 19 [History]


Docusate Sodium 100 mg PO BID PRN 19 [History]


Fluocinolone Acetonide 4 drop EARBOTH ASDIRECTED PRN 19 [History]


Furosemide [Lasix] 40 mg PO DAILY 19 [History]


Isosorbide Mononitrate [Imdur] 90 mg PO DAILY 19 [History]


Metoprolol Succinate [Toprol XL 100mg] 100 mg PO BEDTIME 19 [History]


Multivitamin [Daily Daniel] 1 tab PO DAILY 19 [History]


Nitroglycerin 0.4 mg SL Q5M PRN 19 [History]


Peppermint Oil Cap 1 cap PO BEDTIME 19 [History]


Triamcinolone Acetonide [Triamcinolone Acetonide 0.1% Crm] 1 applic TOP BID PRN 

19 [History]


Ubidecarenone [Coenzyme Q10] 100 mg PO DAILY 19 [History]


Warfarin [Coumadin] 5 mg PO DAILY 19 [History]


lisinopriL [Zestril] 10 mg PO DAILY 19 [History]


nitrofurantoin macrocrystaL [Macrodantin] 50 mg PO MOWEFR@2100 19 

[History]


Acetaminophen [Tylenol Extra Strength] 1,000 mg PO BEDTIME 20 [History]


Cholecalciferol (Vitamin D3) [Vitamin D3] 10 mcg PO DAILY 20 [History]


Isosorbide Mononitrate [Imdur] 30 mg PO DAILY@1600 20 [History]











Past Medical History


HEENT History: Reports: Glaucoma, Macular Degeneration


Cardiovascular History: Reports: Afib, Heart Failure, Hypertension, MI, Stents


Respiratory History: Reports: Pneumonia, Recurrent


Gastrointestinal History: Reports: Diverticulosis


Genitourinary History: Reports: None


OB/GYN History: Reports: Pregnancy


Other OB/BYN History: 


Musculoskeletal History: Reports: Arthritis


Neurological History: Reports: Concussion, CVA, Migraines, Parkinson's, Vertigo


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


Psychiatric History: Reports: Anxiety, Depression


Endocrine/Metabolic History: Reports: Diabetes, Type II, Obesity/BMI 30+


Hematologic History: Reports: Anticoagulation Therapy





- Infectious Disease History


Infectious Disease History: Reports: Shingles





- Past Surgical History


HEENT Surgical History: Reports: Cataract Surgery


Other HEENT Surgeries/Procedures: bilat cataract


Cardiovascular Surgical History: Reports: Coronary Artery Stent


GI Surgical History: Reports: Colonoscopy, Other (See Below)


Other GI Surgeries/Procedures: exp lap


Female  Surgical History: Reports: Hysterectomy





Social & Family History





- Family History


Family Medical History: Noncontributory





- Tobacco Use


Smoking Status *Q: Unknown Ever Smoked


Second Hand Smoke Exposure: No





- Caffeine Use


Caffeine Use: Reports: Coffee, Soda





- Recreational Drug Use


Recreational Drug Use: No





H&P Review of Systems





- Review of Systems:


Review Of Systems: Comprehensive ROS is negative, except as noted in HPI.





Exam





- Exam


Exam: See Below





- Vital Signs


Vital Signs: 


                                Last Vital Signs











Temp  97.2 F   20 08:00


 


Pulse  61   20 08:00


 


Resp  18   20 08:00


 


BP  126/89   20 08:00


 


Pulse Ox  98   20 08:00











Weight: 181 lb 11.2 oz





- Exam


Quality Assessment: Supplemental Oxygen (3L)


General: Alert, Oriented, Cooperative.  No: Mild Distress


HEENT: PERRLA, Conjunctiva Clear, EOMI, Hearing Intact, Mucosa Moist & Pink, 

Glasses


Neck: Trachea Midline


Lungs: Clear to Auscultation, Normal Respiratory Effort, Crackles (Bibasilar).  

No: Wheezing


Cardiovascular: Regular Rate, Irregular Rhythm


GI/Abdominal Exam: Normal Bowel Sounds, Soft, No Distention, Guarding, Tender 

(BLQ).  No: Rigid, Rebound


 (Female) Exam: Deferred


Rectal (Female) Exam: Deferred


Extremities: Pedal Edema (2+ BLE to knees.)


Peripheral Pulses: 2+: Radial (L), Radial (R)





- Patient Data


Lab Results Last 24 hrs: 


                         Laboratory Results - last 24 hr











  20 Range/Units





  12:30 12:30 12:30 


 


WBC  6.3    (4.5-12.0)  X10-3/uL


 


RBC  3.86    (3.23-5.20)  x10(6)uL


 


Hgb  12.2    (11.5-15.5)  g/dL


 


Hct  37.2    (30.0-51.3)  %


 


MCV  96.3 H    (80-96)  fL


 


MCH  31.6    (27.7-33.6)  pg


 


MCHC  32.8    (32.2-35.4)  g/dL


 


RDW  12.3    (11.5-15.5)  %


 


Plt Count  156    (125-369)  X10(3)uL


 


MPV  10.1    (7.4-10.4)  fL


 


Neut % (Auto)  72.4    (46-82)  %


 


Lymph % (Auto)  15.4    (13-37)  %


 


Mono % (Auto)  8.4    (4-12)  %


 


Eos % (Auto)  2    (1.0-5.0)  %


 


Baso % (Auto)  2    (0-2)  %


 


Neut # (Auto)  4.6    (1.6-8.3)  #


 


Lymph # (Auto)  1.0    (0.6-5.0)  #


 


Mono # (Auto)  0.5    (0.0-1.3)  #


 


Eos # (Auto)  0.1    (0.0-0.8)  #


 


Baso # (Auto)  0.1    (0.0-0.2)  #


 


PT   32.1 H   (9.0-11.1)  sec


 


INR   3.21 H   (1.00-1.24)  


 


Sodium    135  (135-145)  mmol/L


 


Potassium    4.5  D  (3.5-5.3)  mmol/L


 


Chloride    97 L D  (100-110)  mmol/L


 


Carbon Dioxide    28  (21-32)  mmol/L


 


BUN    15  D  (7-18)  mg/dL


 


Creatinine    1.3 H  (0.55-1.02)  mg/dL


 


Est Cr Clr Drug Dosing    TNP  


 


Estimated GFR (MDRD)    39 L  (>60)  


 


BUN/Creatinine Ratio    11.5  (9-20)  


 


Glucose    380 H D  ()  mg/dL


 


Calcium    8.7  (8.6-10.2)  mg/dL


 


Total Bilirubin    0.6  (0.1-1.3)  mg/dL


 


AST    21  D  (5-25)  IU/L


 


ALT    21  D  (12-36)  U/L


 


Alkaline Phosphatase    69  ()  IU/L


 


Troponin I     (4.0-60.3)  pg/mL


 


NT-Pro-B Natriuret Pep     (<=450)  pg/mL


 


Total Protein    7.2  (6.0-8.0)  g/dL


 


Albumin    3.5  (3.2-4.6)  g/dL


 


Globulin    3.7  g/dL


 


Albumin/Globulin Ratio    1.0  


 


Urine Color     (YELLOW)  


 


Urine Appearance     (CLEAR)  


 


Urine pH     (5.0-6.5)  


 


Ur Specific Gravity     (1.010-1.025)  


 


Urine Protein     (NEGATIVE)  mg/dL


 


Urine Glucose (UA)     (NORMAL)  mg/dL


 


Urine Ketones     (NEGATIVE)  mg/dL


 


Urine Occult Blood     (NEGATIVE)  


 


Urine Nitrite     (NEGATIVE)  


 


Urine Bilirubin     (NEGATIVE)  


 


Urine Urobilinogen     (NEGATIVE)  mg/dL


 


Ur Leukocyte Esterase     (NEGATIVE)  


 


Urine RBC     (0-5)  


 


Urine WBC     (0-5)  


 


Ur Squamous Epith Cells     (NS,R,O)  


 


Urine Bacteria     (NS)  














  20 Range/Units





  12:30 13:28 06:40 


 


WBC    5.5  (4.5-12.0)  X10-3/uL


 


RBC    3.62  (3.23-5.20)  x10(6)uL


 


Hgb    11.4 L  (11.5-15.5)  g/dL


 


Hct    34.8  (30.0-51.3)  %


 


MCV    96.0  (80-96)  fL


 


MCH    31.5  (27.7-33.6)  pg


 


MCHC    32.8  (32.2-35.4)  g/dL


 


RDW    12.3  (11.5-15.5)  %


 


Plt Count    153  (125-369)  X10(3)uL


 


MPV    10.4  (7.4-10.4)  fL


 


Neut % (Auto)    63.7  (46-82)  %


 


Lymph % (Auto)    22.1  (13-37)  %


 


Mono % (Auto)    9.4  (4-12)  %


 


Eos % (Auto)    2  (1.0-5.0)  %


 


Baso % (Auto)    3 H  (0-2)  %


 


Neut # (Auto)    3.5  (1.6-8.3)  #


 


Lymph # (Auto)    1.2  (0.6-5.0)  #


 


Mono # (Auto)    0.5  (0.0-1.3)  #


 


Eos # (Auto)    0.1  (0.0-0.8)  #


 


Baso # (Auto)    0.2  (0.0-0.2)  #


 


PT     (9.0-11.1)  sec


 


INR     (1.00-1.24)  


 


Sodium     (135-145)  mmol/L


 


Potassium     (3.5-5.3)  mmol/L


 


Chloride     (100-110)  mmol/L


 


Carbon Dioxide     (21-32)  mmol/L


 


BUN     (7-18)  mg/dL


 


Creatinine     (0.55-1.02)  mg/dL


 


Est Cr Clr Drug Dosing     


 


Estimated GFR (MDRD)     (>60)  


 


BUN/Creatinine Ratio     (9-20)  


 


Glucose     ()  mg/dL


 


Calcium     (8.6-10.2)  mg/dL


 


Total Bilirubin     (0.1-1.3)  mg/dL


 


AST     (5-25)  IU/L


 


ALT     (12-36)  U/L


 


Alkaline Phosphatase     ()  IU/L


 


Troponin I  28.7    (4.0-60.3)  pg/mL


 


NT-Pro-B Natriuret Pep  7980 H*    (<=450)  pg/mL


 


Total Protein     (6.0-8.0)  g/dL


 


Albumin     (3.2-4.6)  g/dL


 


Globulin     g/dL


 


Albumin/Globulin Ratio     


 


Urine Color   Yellow   (YELLOW)  


 


Urine Appearance   Clear   (CLEAR)  


 


Urine pH   5.0   (5.0-6.5)  


 


Ur Specific Gravity   1.015   (1.010-1.025)  


 


Urine Protein   Negative   (NEGATIVE)  mg/dL


 


Urine Glucose (UA)   >1000 H   (NORMAL)  mg/dL


 


Urine Ketones   Negative   (NEGATIVE)  mg/dL


 


Urine Occult Blood   Trace   (NEGATIVE)  


 


Urine Nitrite   Negative   (NEGATIVE)  


 


Urine Bilirubin   Negative   (NEGATIVE)  


 


Urine Urobilinogen   Normal   (NEGATIVE)  mg/dL


 


Ur Leukocyte Esterase   Negative   (NEGATIVE)  


 


Urine RBC   0-5   (0-5)  


 


Urine WBC   0-5   (0-5)  


 


Ur Squamous Epith Cells   Moderate H   (NS,R,O)  


 


Urine Bacteria   Moderate H   (NS)  














  20 Range/Units





  06:40 06:40 


 


WBC    (4.5-12.0)  X10-3/uL


 


RBC    (3.23-5.20)  x10(6)uL


 


Hgb    (11.5-15.5)  g/dL


 


Hct    (30.0-51.3)  %


 


MCV    (80-96)  fL


 


MCH    (27.7-33.6)  pg


 


MCHC    (32.2-35.4)  g/dL


 


RDW    (11.5-15.5)  %


 


Plt Count    (125-369)  X10(3)uL


 


MPV    (7.4-10.4)  fL


 


Neut % (Auto)    (46-82)  %


 


Lymph % (Auto)    (13-37)  %


 


Mono % (Auto)    (4-12)  %


 


Eos % (Auto)    (1.0-5.0)  %


 


Baso % (Auto)    (0-2)  %


 


Neut # (Auto)    (1.6-8.3)  #


 


Lymph # (Auto)    (0.6-5.0)  #


 


Mono # (Auto)    (0.0-1.3)  #


 


Eos # (Auto)    (0.0-0.8)  #


 


Baso # (Auto)    (0.0-0.2)  #


 


PT  39.7 H*   (9.0-11.1)  sec


 


INR  4.03 H*   (1.00-1.24)  


 


Sodium   134 L  (135-145)  mmol/L


 


Potassium   3.7  (3.5-5.3)  mmol/L


 


Chloride   95 L  (100-110)  mmol/L


 


Carbon Dioxide   33 H  (21-32)  mmol/L


 


BUN   15  (7-18)  mg/dL


 


Creatinine   1.2 H  (0.55-1.02)  mg/dL


 


Est Cr Clr Drug Dosing   23.28  


 


Estimated GFR (MDRD)   42 L  (>60)  


 


BUN/Creatinine Ratio   12.5  (9-20)  


 


Glucose   266 H D  ()  mg/dL


 


Calcium   8.5 L  (8.6-10.2)  mg/dL


 


Total Bilirubin    (0.1-1.3)  mg/dL


 


AST    (5-25)  IU/L


 


ALT    (12-36)  U/L


 


Alkaline Phosphatase    ()  IU/L


 


Troponin I    (4.0-60.3)  pg/mL


 


NT-Pro-B Natriuret Pep    (<=450)  pg/mL


 


Total Protein    (6.0-8.0)  g/dL


 


Albumin    (3.2-4.6)  g/dL


 


Globulin    g/dL


 


Albumin/Globulin Ratio    


 


Urine Color    (YELLOW)  


 


Urine Appearance    (CLEAR)  


 


Urine pH    (5.0-6.5)  


 


Ur Specific Gravity    (1.010-1.025)  


 


Urine Protein    (NEGATIVE)  mg/dL


 


Urine Glucose (UA)    (NORMAL)  mg/dL


 


Urine Ketones    (NEGATIVE)  mg/dL


 


Urine Occult Blood    (NEGATIVE)  


 


Urine Nitrite    (NEGATIVE)  


 


Urine Bilirubin    (NEGATIVE)  


 


Urine Urobilinogen    (NEGATIVE)  mg/dL


 


Ur Leukocyte Esterase    (NEGATIVE)  


 


Urine RBC    (0-5)  


 


Urine WBC    (0-5)  


 


Ur Squamous Epith Cells    (NS,R,O)  


 


Urine Bacteria    (NS)  











Result Diagrams: 


                                 20 06:40





                                 20 06:40





Sepsis Event Note





- Evaluation


Sepsis Screening Result: No Definite Risk





- Focused Exam


Vital Signs: 


                                   Vital Signs











  Temp Temp Pulse Resp BP Pulse Ox Pulse Ox


 


 20 08:00   97.2 F  61  18  126/89  98 


 


 20 04:00   97.2 F  73  20  122/57 L  99 


 


 20 00:00  97.5 F   60  18  135/87  99  99














*Q Meaningful Use (ADM)





- VTE Risk Assess *Q


Each Risk Factor Represents 1 Point: Congestive heart failure (CHF)


Total Score 1 Point Risk Factors: 1


Each Risk Factor Represents 2 Points: None


Total Score 2 Point Risk Factors: 0


Each Risk Factor Represents 3 Points: Age 75 Years or Greater


Total Score 3 Point Risk Factors: 3


Each Risk Factor Represents 5 Points: None


Total Score 5 Point Risk Factors: 0


Venous Thromboembolism Risk Factor Score *Q: 4





- Problem List


(1) Diverticulitis


SNOMED Code(s): 324014033


   ICD Code: K57.92 - DVTRCLI OF INTEST, PART UNSP, W/O PERF OR ABSCESS W/O 

BLEED   Status: Acute   Current Visit: Yes   Problem Details: Rocephin & Flagyl 

day 2.   





(2) Chronic systolic congestive heart failure, NYHA class 3


SNOMED Code(s): 046830141, 678783605, 052110196


   ICD Code: I50.22 - CHRONIC SYSTOLIC (CONGESTIVE) HEART FAILURE   Status: 

Chronic   Current Visit: Yes   Problem Details: EF 30%, last seen in [place] 15, 

2020, Imdur increased to 60 mg in am and 30 mg in afternoon then on Aug 10th 

increased to 90 mg in am and 30 mg in afternoon. Had not wanted any stress 

testing or invasive coronary angiogram. Dr Patel had discussed hospice in the 

future if they could not control her symptoms with antianginals.   





(3) Afib


SNOMED Code(s): 68138026


   ICD Code: I48.91 - UNSPECIFIED ATRIAL FIBRILLATION   Status: Chronic   

Current Visit: Yes   





(4) Acute renal insufficiency


SNOMED Code(s): 553866348


   ICD Code: N28.9 - DISORDER OF KIDNEY AND URETER, UNSPECIFIED   Status: Acute 

  Current Visit: No   





(5) Elevated INR


SNOMED Code(s): 443843685


   ICD Code: R79.1 - ABNORMAL COAGULATION PROFILE   Status: Acute   Current 

Visit: No   Problem Details: Coumadin per pharmacy. Hold dose today, adjust as 

needed.   





(6) Weakness


SNOMED Code(s): 48983499


   ICD Code: R53.1 - WEAKNESS   Status: Acute   Current Visit: Yes   





(7) Declining functional status


SNOMED Code(s): 879534087881899


   ICD Code: R53.81 - OTHER MALAISE   Status: Acute   Current Visit: Yes   





(8) Chest pain


SNOMED Code(s): 65427412


   ICD Code: R07.9 - CHEST PAIN, UNSPECIFIED   Status: Chronic   Current Visit: 

Yes   


Qualifiers: 


   Ischemic chest pain type: stable angina pectoris 





(9) Diabetes mellitus type 2, diet-controlled


SNOMED Code(s): 529239494933935, 275297778446432


   ICD Code: E11.9 - TYPE 2 DIABETES MELLITUS WITHOUT COMPLICATIONS   Status: 

Chronic   Current Visit: Yes   


Problem List Initiated/Reviewed/Updated: Yes


Orders Last 24hrs: 


                               Active Orders 24 hr











 Category Date Time Status


 


 Patient Status [ADT] Routine ADT  20 18:05 Active


 


 Cardiac Monitoring [RC] CONTINUOUS Care  20 18:11 Active


 


 Intake and Output [RC] 06,14,22 Care  20 18:11 Active


 


 Oxygen Therapy [RC] PRN Care  20 18:05 Active


 


 Pulse Oximetry [RC] CONTINUOUS Care  20 18:11 Active


 


 Vital Signs [RC] 00,04,08,12,16,20 Care  20 18:05 Active


 


 OT Evaluation and Treatment [CONS] Routine Cons  20 08:39 Active


 


 PT Evaluation and Treatment [CONS] Routine Cons  20 08:39 Active


 


 Consistent Carbohydrate Diet [DIET] Diet  20 Lunch Active


 


 Heart Healthy Diet [DIET] Diet  20 Dinner Ordered


 


 BASIC METABOLIC PANEL,BMP [CHEM] Routine Lab  20 06:00 Ordered


 


 INR,PT,PROTHROMBIN TIME [COAG] DAILY Lab  20 06:00 Ordered


 


 INR,PT,PROTHROMBIN TIME [COAG] DAILY Lab  08/15/20 06:00 Ordered


 


 INR,PT,PROTHROMBIN TIME [COAG] DAILY Lab  20 06:00 Ordered


 


 INR,PT,PROTHROMBIN TIME [COAG] DAILY Lab  20 06:00 Ordered


 


 Acetaminophen [Tylenol Extra Strength] Med  20 21:00 Active





 1,000 mg PO BEDTIME   


 


 Acetaminophen [Tylenol Extra Strength] Med  20 21:00 Active





 500 mg PO BID   


 


 Acetaminophen [Tylenol Extra Strength] Med  20 09:00 Active





 500 mg PO DAILY   


 


 Ascorbic Acid [Vitamin C] Med  20 21:00 Active





 1,000 mg PO BEDTIME   


 


 Docusate Sodium [Colace] Med  20 18:15 Active





 100 mg PO BID PRN   


 


 Furosemide [Lasix] Med  20 21:00 Active





 40 mg IVPUSH BID   


 


 Isosorbide Mononitrate [Imdur] Med  20 16:00 Active





 30 mg PO DAILY@1600   


 


 Isosorbide Mononitrate [Imdur] Med  20 10:00 Active





 90 mg PO DAILY   


 


 Metoprolol Succinate [Toprol XL] Med  20 21:00 Active





 100 mg PO BEDTIME   


 


 Multivitamins [Tab-A-Daniel] Med  20 09:00 Active





 1 tab PO DAILY   


 


 Nitroglycerin [Nitrostat] Med  20 18:15 Active





 0.4 mg SL Q5M PRN   


 


 Ondansetron [Zofran] Med  20 18:04 Active





 4 mg IV Q4H PRN   


 


 Sodium Chloride 0.9% [Saline Flush] Med  20 13:13 Active





 10 ml FLUSH ASDIRECTED PRN   


 


 Triamcinolone Acetonide [Triamcinolone Acetonide 0.1% Med  20 18:15 

Active





 Crm]   





 0 gm TOP BID PRN   


 


 Ubidecarenone [Coenzyme Q10] Med  20 17:00 Active





 100 mg PO QPM   


 


 Warfarin Sliding Scale [Coumadin Sliding Scale] Med  20 08:15 Pending





 1 each PO ASDIRECTED   


 


 bisacodyL [Dulcolax] Med  20 18:04 Active





 5 mg PO DAILY PRN   


 


 cefTRIAXone [Rocephin] Med  20 18:30 Active





 1 gm IVPUSH Q24H   


 


 metroNIDAZOLE/Normal Saline [Flagyl 500 MG in  ML Med  20 18:30 

Active





 ] 500 mg   





 Premix Bag 1 bag   





 IV Q8H   


 


 Saline Lock Insert [OM.PC] Routine Oth  20 13:13 Ordered


 


 Resuscitation Status Routine Resus Stat  20 18:04 Ordered


 


 EKG 12 Lead [EK] Routine Ther  20 12:19 Ordered








                                Medication Orders





Acetaminophen (Tylenol Extra Strength)  500 mg PO BID [facility]


   Stop: 20 12:00


   Last Admin: 20 09:59  Dose: 500 mg


   Documented by: AUTUMN


   Admin: 20 20:29  Dose: 500 mg


   Documented by: MAGDA


Acetaminophen (Tylenol Extra Strength)  1,000 mg PO BEDTIME [facility]


Acetaminophen (Tylenol Extra Strength)  500 mg PO DAILY [facility]


Ascorbic Acid (Vitamin C)  1,000 mg PO BEDTIME [facility]


   Last Admin: 20 20:28  Dose: 1,000 mg


   Documented by: MAGDA


Bisacodyl (Dulcolax)  5 mg PO DAILY PRN


   PRN Reason: Constipation


Ceftriaxone Sodium (Rocephin)  1 gm IVPUSH Q24H [facility]


   Last Admin: 20 19:19  Dose: 1 gm


   Documented by: KOBE


Coenzyme Q10 (Coenzyme Q10)  100 mg PO QPM [id number]


Docusate Sodium (Colace)  100 mg PO BID PRN


   PRN Reason: Constipation


Furosemide (Lasix)  40 mg IVPUSH BID [facility]


   Last Admin: 20 09:59  Dose: 40 mg


   Documented by: AUTUMN


   Admin: 20 19:19  Dose: 40 mg


   Documented by: KOBE


Metronidazole 500 mg/ Premix  100 mls @ 100 mls/hr IV Q8H [facility]


   Last Admin: 20 02:41  Dose: 100 mls/hr


   Documented by: CHAIM


   Infusion: 20 20:19  Dose: 100 mls/hr


   Documented by: CHAIM


   Admin: 20 19:19  Dose: 100 mls/hr


   Documented by: KOBE


Isosorbide Mononitrate (Imdur)  90 mg PO DAILY [facility]


Isosorbide Mononitrate (Imdur)  30 mg PO DAILY@1600 [facility]


Metoprolol Succinate (Toprol Xl)  100 mg PO BEDTIME [facility]


   Last Admin: 20 20:29  Dose: 100 mg


   Documented by: MAGDA


Multivitamins/Minerals/Vitamin C (Tab-A-Daniel)  1 tab PO DAILY [facility]


   Last Admin: 20 09:59  Dose: 1 tab


   Documented by: AUTUMN


Nitroglycerin (Nitrostat)  0.4 mg SL Q5M PRN


   PRN Reason: Chest Pain


Ondansetron HCl (Zofran)  4 mg IV Q4H PRN


   PRN Reason: Nausea/Vomiting


Sodium Chloride (Saline Flush)  10 ml FLUSH ASDIRECTED PRN


   PRN Reason: Keep Vein Open


   Last Admin: 20 02:41  Dose: 10 ml


   Documented by: CHAIM


Triamcinolone Acetonide (Triamcinolone Acetonide 0.1% Crm)  0 gm TOP BID PRN


   PRN Reason: Rash


Warfarin Sodium (Coumadin Sliding Scale)  1 each PO ASDIRECTED [facility]








Assessment/Plan Comment:: 


1. Admit for early Diverticulitis, CHF exacerbation, stable angina.


2. Diverticulitis: Rocephin & Flagyl day 2.


3. CHF: Lasix 40 mg IV bid, I&Os, daily weight. Repeat lab tomorrow.


4. Angina: Imdur 90 mg in am, 20 mg in pm. Dr Patel, Cardiology  note 

reviewed, they had discussed possible hospice in the future if they could not 

control her symptoms with antianginals as she did not want stress testing or 

invasive coronary angiogram done. She is due to see him back in Sept.


5. Weakness, functional decline: PT/OT evaluate & treat.


6. Elevated INR: Coumadin held today, daily INR, Pharmacy to dose.


7. Diet controlled DM: consistent carb/heart health diet.


8. DNR/DNI. 


9. Discharge planning: will access services needed, may benefit from swing bed 

placement for strengthening if Sylwia and her family wish for her to go back 

home. See above what Cardiology had discussed.





- Mortality Measure


Prognosis:: Poor

## 2020-08-14 RX ADMIN — METRONIDAZOLE SCH MLS/HR: 500 SOLUTION INTRAVENOUS at 02:09

## 2020-08-14 RX ADMIN — METRONIDAZOLE SCH MLS/HR: 500 SOLUTION INTRAVENOUS at 09:30

## 2020-08-14 RX ADMIN — POTASSIUM CHLORIDE SCH MEQ: 1500 TABLET, EXTENDED RELEASE ORAL at 09:30

## 2020-08-14 RX ADMIN — POTASSIUM CHLORIDE SCH MEQ: 1500 TABLET, EXTENDED RELEASE ORAL at 20:01

## 2020-08-14 RX ADMIN — Medication PRN ML: at 18:33

## 2020-08-14 RX ADMIN — Medication PRN ML: at 20:07

## 2020-08-14 RX ADMIN — Medication PRN ML: at 19:32

## 2020-08-14 RX ADMIN — METRONIDAZOLE SCH MLS/HR: 500 SOLUTION INTRAVENOUS at 18:32

## 2020-08-14 RX ADMIN — Medication PRN ML: at 04:02

## 2020-08-14 RX ADMIN — METOPROLOL SUCCINATE SCH MG: 100 TABLET, FILM COATED, EXTENDED RELEASE ORAL at 20:06

## 2020-08-14 NOTE — PCM.PN
- General Info


Date of Service: 08/14/20


Admission Dx/Problem (Free Text): 


                           Admission Diagnosis/Problem





Admission Diagnosis/Problem      Diverticulitis, Atrial fibrillation and 

flutter, CHF








Subjective Update: 


Sylwia feels weak. She also has some diarrhea and chronic abdominal pain. No 

fever or cough. She has no chest pain today.





- Review of Systems


Pulmonary: Reports: Shortness of Breath


Cardiovascular: Reports: No Symptoms


Gastrointestinal: Reports: Abdominal Pain, Diarrhea


Genitourinary: Reports: No Symptoms


Musculoskeletal: Reports: No Symptoms


Neurological: Reports: Other (Tremors)





- Patient Data


Vitals - Most Recent: 


                                Last Vital Signs











Temp  97.1 F   08/14/20 04:00


 


Pulse  62   08/14/20 04:00


 


Resp  19   08/14/20 04:00


 


BP  125/52 L  08/14/20 04:00


 


Pulse Ox  100   08/14/20 04:00











Weight - Most Recent: 80.513 kg


I&O - Last 24 Hours: 


                                 Intake & Output











 08/13/20 08/14/20 08/14/20





 22:59 06:59 14:59


 


Intake Total  100 250


 


Output Total 700 875 


 


Balance -700 -775 250











Lab Results Last 24 Hours: 


                         Laboratory Results - last 24 hr











  08/14/20 08/14/20 Range/Units





  06:50 06:50 


 


PT  26.6 H   (9.0-11.1)  sec


 


INR  2.62 H   (1.00-1.24)  


 


Sodium   137  (135-145)  mmol/L


 


Potassium   2.9 L  (3.5-5.3)  mmol/L


 


Chloride   93 L  (100-110)  mmol/L


 


Carbon Dioxide   35 H  (21-32)  mmol/L


 


BUN   22 H  (7-18)  mg/dL


 


Creatinine   1.4 H  (0.55-1.02)  mg/dL


 


Est Cr Clr Drug Dosing   19.95  mL/min


 


Estimated GFR (MDRD)   35 L  (>60)  


 


BUN/Creatinine Ratio   15.7  (9-20)  


 


Glucose   217 H  ()  mg/dL


 


Calcium   8.3 L  (8.6-10.2)  mg/dL











Med Orders - Current: 


                               Current Medications





Acetaminophen (Tylenol Extra Strength)  1,000 mg PO BEDTIME [facility]


   Last Admin: 08/13/20 20:06 Dose:  1,000 mg


   Documented by: 


Acetaminophen (Tylenol Extra Strength)  500 mg PO DAILY [facility]


Ascorbic Acid (Vitamin C)  1,000 mg PO BEDTIME [facility]


   Last Admin: 08/13/20 20:06 Dose:  1,000 mg


   Documented by: 


Bisacodyl (Dulcolax)  5 mg PO DAILY PRN


   PRN Reason: Constipation


Ceftriaxone Sodium (Rocephin)  1 gm IVPUSH Q24H [facility]


   Last Admin: 08/13/20 18:30 Dose:  1 gm


   Documented by: 


Coenzyme Q10 (Coenzyme Q10)  100 mg PO QPM [facility]


   Last Admin: 08/13/20 17:01 Dose:  100 mg


   Documented by: 


Docusate Sodium (Colace)  100 mg PO BID PRN


   PRN Reason: Constipation


Furosemide (Lasix)  40 mg IVPUSH BID [facility]


   Last Admin: 08/13/20 20:07 Dose:  40 mg


   Documented by: 


Metronidazole 500 mg/ Premix  100 mls @ 100 mls/hr IV Q8H [facility]


   Last Admin: 08/14/20 02:09 Dose:  100 mls/hr


   Documented by: 


Isosorbide Mononitrate (Imdur)  90 mg PO DAILY [facility]


   Last Admin: 08/13/20 10:49 Dose:  90 mg


   Documented by: 


Isosorbide Mononitrate (Imdur)  30 mg PO DAILY@1600 [facility]


   Last Admin: 08/13/20 15:17 Dose:  30 mg


   Documented by: 


Metoprolol Succinate (Toprol Xl)  100 mg PO BEDTIME [facility]


   Last Admin: 08/13/20 20:05 Dose:  100 mg


   Documented by: 


Multivitamins/Minerals/Vitamin C (Tab-A-Daniel)  1 tab PO DAILY [facility]


   Last Admin: 08/13/20 09:59 Dose:  1 tab


   Documented by: 


Nitroglycerin (Nitrostat)  0.4 mg SL Q5M PRN


   PRN Reason: Chest Pain


Ondansetron HCl (Zofran)  4 mg IV Q4H PRN


   PRN Reason: Nausea/Vomiting


Potassium Chloride (Klor-Con M20)  20 meq PO BID [facility]


Sodium Chloride (Saline Flush)  10 ml FLUSH ASDIRECTED PRN


   PRN Reason: Keep Vein Open


   Last Admin: 08/14/20 04:02 Dose:  10 ml


   Documented by: 


Triamcinolone Acetonide (Triamcinolone Acetonide 0.1% Crm)  0 gm TOP BID PRN


   PRN Reason: Rash


Warfarin Sodium (Coumadin Sliding Scale)  1 each PO ASDIRECTED [facility]


Warfarin Sodium (Coumadin)  5 mg PO ONETIME ONE


   Stop: 08/14/20 16:01





Discontinued Medications





Acetaminophen (Tylenol Extra Strength)  500 mg PO BID [facility]


   Stop: 08/13/20 12:00


   Last Admin: 08/13/20 09:59 Dose:  500 mg


   Documented by: 


Aspirin (Aspirin)  324 mg PO ONETIME ONE


   Stop: 08/12/20 14:26


   Last Admin: 08/12/20 14:54 Dose:  324 mg


   Documented by: 


Diatrizoate Meglum/Diatrizoate Sod (Gastrografin 37%)  30 ml PO .AS DIRECTED ONE


   Stop: 08/12/20 15:42


   Last Admin: 08/12/20 22:37 Dose:  Not Given


   Documented by: 


Diltiazem HCl (Diltiazem)  25 mg IVPUSH ONETIME ONE


   Stop: 08/12/20 14:26


   Last Admin: 08/12/20 14:48 Dose:  25 mg


   Documented by: 


Metolazone (Zaroxolyn)  2.5 mg PO ONETIME ONE


   Stop: 08/12/20 18:21


   Last Admin: 08/12/20 20:33 Dose:  2.5 mg


   Documented by: 


Metolazone (Zaroxolyn) Confirm Administered Dose 2.5 mg .ROUTE .STK-MED ONE


   Stop: 08/12/20 20:32


   Last Admin: 08/12/20 20:34 Dose:  Not Given


   Documented by: 


Morphine Sulfate (Morphine)  2 mg IVPUSH ONETIME ONE


   Stop: 08/12/20 14:27


   Last Admin: 08/12/20 14:48 Dose:  2 mg


   Documented by: 


Non-Formulary Medication (Cholecalciferol (Vitamin D3) [Vitamin D3])  400 unit 

PO BEDTIME [facility]


   Last Admin: 08/13/20 22:38 Dose:  Not Given


   Documented by: 


Non-Formulary Medication (Fluocinolone Acetonide [Fluocinolone Acetonide])  4 

drop TOP ASDIRECTED PRN


   PRN Reason: Dizziness


Non-Formulary Medication (Peppermint Oil Cap)  1 cap PO BEDTIME [facility]


   Last Admin: 08/13/20 22:38 Dose:  Not Given


   Documented by: 


Spironolactone (Aldactone)  12.5 mg PO DAILY [facility]











- Exam


Quality Assessment: Supplemental Oxygen


General: Alert


Neck: Supple


Lungs: Crackles, Rales


Cardiovascular: Irregular Rhythm


GI/Abdominal Exam: Normal Bowel Sounds, Soft, Tender


Extremities: Normal Inspection





Sepsis Event Note





- Evaluation


Sepsis Screening Result: No Definite Risk





- Focused Exam


Vital Signs: 


                                   Vital Signs











  Temp Pulse Resp BP Pulse Ox Pulse Ox


 


 08/14/20 04:00  97.1 F  62  19  125/52 L  100 


 


 08/14/20 00:00  97 F  70  20  126/56 L  97  97














- Problem List & Annotations


(1) Chronic systolic congestive heart failure, NYHA class 3


SNOMED Code(s): 284071799, 172929377, 279959079


   Code(s): I50.22 - CHRONIC SYSTOLIC (CONGESTIVE) HEART FAILURE   Status: 

Chronic   Current Visit: Yes   





(2) Ischemic cardiomyopathy


SNOMED Code(s): 028536248


   Code(s): I25.5 - ISCHEMIC CARDIOMYOPATHY   Status: Acute   Current Visit: Yes

  





(3) Declining functional status


SNOMED Code(s): 956659858531340


   Code(s): R53.81 - OTHER MALAISE   Status: Acute   Current Visit: Yes   





(4) Diverticulitis


SNOMED Code(s): 156247155


   Code(s): K57.92 - DVTRCLI OF INTEST, PART UNSP, W/O PERF OR ABSCESS W/O BLEED

  Status: Acute   Current Visit: Yes   Annotation/Comment:: Rocephin & Flagyl 

day 2.   





(5) Diabetes mellitus type 2, diet-controlled


SNOMED Code(s): 667526411519477, 726589115412464


   Code(s): E11.9 - TYPE 2 DIABETES MELLITUS WITHOUT COMPLICATIONS   Status: 

Chronic   Current Visit: Yes   





(6) Hypokalemia


SNOMED Code(s): 57993608


   Code(s): E87.6 - HYPOKALEMIA   Status: Acute   Current Visit: Yes   





(7) Parkinson disease


SNOMED Code(s): 98419528


   Code(s): G20 - PARKINSON'S DISEASE   Status: Acute   Current Visit: Yes   





- Problem List Review


Problem List Initiated/Reviewed/Updated: Yes





- My Orders


Last 24 Hours: 


My Active Orders





08/14/20 09:15


Potassium Chloride [Klor-Con M20]   20 meq PO BID 





08/14/20 Lunch


Clear Liquid Diet [DIET] 





08/14/20 16:00


Warfarin [Coumadin]   5 mg PO ONETIME ONE 





08/15/20 05:11


BASIC METABOLIC PANEL,BMP [CHEM] AM 


CBC WITH AUTO DIFF [HEME] AM 


PRO B-TYPE NATRIUR PEPT,BNPPRO [CHEM] DAILY 





08/16/20 05:11


PRO B-TYPE NATRIUR PEPT,BNPPRO [CHEM] DAILY 














- Plan


Plan:: 


Keep in clear diet. Continue IV abx,and IV Lasix. Replace K.Repeat CBC,CMP 

tomorrow.I appreciate PT input.

## 2020-08-15 RX ADMIN — Medication PRN ML: at 08:19

## 2020-08-15 RX ADMIN — METRONIDAZOLE SCH MLS/HR: 500 SOLUTION INTRAVENOUS at 18:18

## 2020-08-15 RX ADMIN — Medication PRN ML: at 19:20

## 2020-08-15 RX ADMIN — METRONIDAZOLE SCH MLS/HR: 500 SOLUTION INTRAVENOUS at 10:50

## 2020-08-15 RX ADMIN — Medication PRN ML: at 10:50

## 2020-08-15 RX ADMIN — Medication PRN ML: at 01:45

## 2020-08-15 RX ADMIN — POTASSIUM CHLORIDE SCH MEQ: 1500 TABLET, EXTENDED RELEASE ORAL at 08:18

## 2020-08-15 RX ADMIN — METRONIDAZOLE SCH MLS/HR: 500 SOLUTION INTRAVENOUS at 01:43

## 2020-08-15 RX ADMIN — Medication PRN ML: at 21:00

## 2020-08-15 RX ADMIN — POTASSIUM CHLORIDE SCH MEQ: 1500 TABLET, EXTENDED RELEASE ORAL at 21:02

## 2020-08-15 RX ADMIN — POTASSIUM CHLORIDE SCH MEQ: 1500 TABLET, EXTENDED RELEASE ORAL at 14:48

## 2020-08-15 RX ADMIN — METOPROLOL SUCCINATE SCH MG: 100 TABLET, FILM COATED, EXTENDED RELEASE ORAL at 21:03

## 2020-08-15 NOTE — PCM.PN
- General Info


Date of Service: 08/15/20


Subjective Update: 


Sylwia feels weak. She also has some diarrhea and chronic abdominal pain. No 

fever or cough. She has no chest pain today.


Functional Status: Reports: Pain Controlled





- Review of Systems


HEENT: Reports: No Symptoms


Pulmonary: Reports: No Symptoms





- Patient Data


Vitals - Most Recent: 


                                Last Vital Signs











Temp  97.6 F   08/15/20 01:00


 


Pulse  71   08/15/20 01:00


 


Resp  20   08/15/20 01:00


 


BP  122/65   08/15/20 08:26


 


Pulse Ox  99   08/15/20 01:00











Weight - Most Recent: 78.97 kg


I&O - Last 24 Hours: 


                                 Intake & Output











 08/14/20 08/15/20 08/15/20





 22:59 06:59 14:59


 


Output Total 300 900 


 


Balance -300 -900 











Lab Results Last 24 Hours: 


                         Laboratory Results - last 24 hr











  08/15/20 08/15/20 08/15/20 Range/Units





  06:35 06:35 06:35 


 


WBC   4.8   (4.5-12.0)  X10-3/uL


 


RBC   3.96   (3.23-5.20)  x10(6)uL


 


Hgb   12.4   (11.5-15.5)  g/dL


 


Hct   37.8   (30.0-51.3)  %


 


MCV   95.4   (80-96)  fL


 


MCH   31.4   (27.7-33.6)  pg


 


MCHC   32.9   (32.2-35.4)  g/dL


 


RDW   12.1   (11.5-15.5)  %


 


Plt Count   171   (125-369)  X10(3)uL


 


MPV   10.9 H   (7.4-10.4)  fL


 


Neut % (Auto)   55.0   (46-82)  %


 


Lymph % (Auto)   26.6   (13-37)  %


 


Mono % (Auto)   12.1 H   (4-12)  %


 


Eos % (Auto)   5   (1.0-5.0)  %


 


Baso % (Auto)   2   (0-2)  %


 


Neut # (Auto)   2.6   (1.6-8.3)  #


 


Lymph # (Auto)   1.3   (0.6-5.0)  #


 


Mono # (Auto)   0.6   (0.0-1.3)  #


 


Eos # (Auto)   0.2   (0.0-0.8)  #


 


Baso # (Auto)   0.1   (0.0-0.2)  #


 


PT  25.3 H    (9.0-11.1)  sec


 


INR  2.48 H    (1.00-1.24)  


 


Sodium    138  (135-145)  mmol/L


 


Potassium    2.8 L*  (3.5-5.3)  mmol/L


 


Chloride    96 L  (100-110)  mmol/L


 


Carbon Dioxide    35 H  (21-32)  mmol/L


 


BUN    17  (7-18)  mg/dL


 


Creatinine    1.3 H  (0.55-1.02)  mg/dL


 


Est Cr Clr Drug Dosing    21.49  mL/min


 


Estimated GFR (MDRD)    39 L  (>60)  


 


BUN/Creatinine Ratio    13.1  (9-20)  


 


Glucose    217 H  ()  mg/dL


 


Calcium    8.5 L  (8.6-10.2)  mg/dL


 


NT-Pro-B Natriuret Pep     (<=450)  pg/mL














  08/15/20 Range/Units





  06:35 


 


WBC   (4.5-12.0)  X10-3/uL


 


RBC   (3.23-5.20)  x10(6)uL


 


Hgb   (11.5-15.5)  g/dL


 


Hct   (30.0-51.3)  %


 


MCV   (80-96)  fL


 


MCH   (27.7-33.6)  pg


 


MCHC   (32.2-35.4)  g/dL


 


RDW   (11.5-15.5)  %


 


Plt Count   (125-369)  X10(3)uL


 


MPV   (7.4-10.4)  fL


 


Neut % (Auto)   (46-82)  %


 


Lymph % (Auto)   (13-37)  %


 


Mono % (Auto)   (4-12)  %


 


Eos % (Auto)   (1.0-5.0)  %


 


Baso % (Auto)   (0-2)  %


 


Neut # (Auto)   (1.6-8.3)  #


 


Lymph # (Auto)   (0.6-5.0)  #


 


Mono # (Auto)   (0.0-1.3)  #


 


Eos # (Auto)   (0.0-0.8)  #


 


Baso # (Auto)   (0.0-0.2)  #


 


PT   (9.0-11.1)  sec


 


INR   (1.00-1.24)  


 


Sodium   (135-145)  mmol/L


 


Potassium   (3.5-5.3)  mmol/L


 


Chloride   (100-110)  mmol/L


 


Carbon Dioxide   (21-32)  mmol/L


 


BUN   (7-18)  mg/dL


 


Creatinine   (0.55-1.02)  mg/dL


 


Est Cr Clr Drug Dosing   mL/min


 


Estimated GFR (MDRD)   (>60)  


 


BUN/Creatinine Ratio   (9-20)  


 


Glucose   ()  mg/dL


 


Calcium   (8.6-10.2)  mg/dL


 


NT-Pro-B Natriuret Pep  6722 H*  (<=450)  pg/mL











Med Orders - Current: 


                               Current Medications





Acetaminophen (Tylenol Extra Strength)  1,000 mg PO BEDTIME [facility]


   Last Admin: 08/14/20 20:02 Dose:  1,000 mg


   Documented by: 


Acetaminophen (Tylenol Extra Strength)  500 mg PO DAILY [facility]


   Last Admin: 08/15/20 08:18 Dose:  500 mg


   Documented by: 


Ascorbic Acid (Vitamin C)  1,000 mg PO BEDTIME [facility]


   Last Admin: 08/14/20 20:06 Dose:  1,000 mg


   Documented by: 


Bisacodyl (Dulcolax)  5 mg PO DAILY PRN


   PRN Reason: Constipation


Ceftriaxone Sodium (Rocephin)  1 gm IVPUSH Q24H [facility]


   Last Admin: 08/14/20 18:32 Dose:  1 gm


   Documented by: 


Coenzyme Q10 (Coenzyme Q10)  100 mg PO QPM [facility]


   Last Admin: 08/14/20 16:19 Dose:  100 mg


   Documented by: 


Docusate Sodium (Colace)  100 mg PO BID PRN


   PRN Reason: Constipation


Furosemide (Lasix)  40 mg IVPUSH BID [facility]


   Last Admin: 08/15/20 08:18 Dose:  40 mg


   Documented by: 


Metronidazole 500 mg/ Premix  100 mls @ 100 mls/hr IV Q8H [facility]


   Last Admin: 08/15/20 01:43 Dose:  100 mls/hr


   Documented by: 


Isosorbide Mononitrate (Imdur)  90 mg PO DAILY [facility]


   Last Admin: 08/15/20 08:26 Dose:  90 mg


   Documented by: 


Isosorbide Mononitrate (Imdur)  30 mg PO DAILY@1600 [facility]


   Last Admin: 08/14/20 16:19 Dose:  30 mg


   Documented by: 


Metoprolol Succinate (Toprol Xl)  100 mg PO BEDTIME [facility]


   Last Admin: 08/14/20 20:06 Dose:  100 mg


   Documented by: 


Multivitamins/Minerals/Vitamin C (Tab-A-Daniel)  1 tab PO DAILY [facility]


   Last Admin: 08/15/20 08:18 Dose:  1 tab


   Documented by: 


Nitroglycerin (Nitrostat)  0.4 mg SL Q5M PRN


   PRN Reason: Chest Pain


Ondansetron HCl (Zofran)  4 mg IV Q4H PRN


   PRN Reason: Nausea/Vomiting


Potassium Chloride (Klor-Con M20)  40 meq PO TID [facility]


Sodium Chloride (Saline Flush)  10 ml FLUSH ASDIRECTED PRN


   PRN Reason: Keep Vein Open


   Last Admin: 08/15/20 08:19 Dose:  10 ml


   Documented by: 


Triamcinolone Acetonide (Triamcinolone Acetonide 0.1% Crm)  0 gm TOP BID PRN


   PRN Reason: Rash


Warfarin Sodium (Coumadin Sliding Scale)  1 each PO ASDIRECTED [facility]


Warfarin Sodium (Coumadin)  5 mg PO ONETIME ONE


   Stop: 08/15/20 16:01





Discontinued Medications





Acetaminophen (Tylenol Extra Strength)  500 mg PO BID [facility]


   Stop: 08/13/20 12:00


   Last Admin: 08/13/20 09:59 Dose:  500 mg


   Documented by: 


Aspirin (Aspirin)  324 mg PO ONETIME ONE


   Stop: 08/12/20 14:26


   Last Admin: 08/12/20 14:54 Dose:  324 mg


   Documented by: 


Diatrizoate Meglum/Diatrizoate Sod (Gastrografin 37%)  30 ml PO .AS DIRECTED ONE


   Stop: 08/12/20 15:42


   Last Admin: 08/12/20 22:37 Dose:  Not Given


   Documented by: 


Diltiazem HCl (Diltiazem)  25 mg IVPUSH ONETIME ONE


   Stop: 08/12/20 14:26


   Last Admin: 08/12/20 14:48 Dose:  25 mg


   Documented by: 


Metolazone (Zaroxolyn)  2.5 mg PO ONETIME ONE


   Stop: 08/12/20 18:21


   Last Admin: 08/12/20 20:33 Dose:  2.5 mg


   Documented by: 


Metolazone (Zaroxolyn) Confirm Administered Dose 2.5 mg .ROUTE .STK-MED ONE


   Stop: 08/12/20 20:32


   Last Admin: 08/12/20 20:34 Dose:  Not Given


   Documented by: 


Morphine Sulfate (Morphine)  2 mg IVPUSH ONETIME ONE


   Stop: 08/12/20 14:27


   Last Admin: 08/12/20 14:48 Dose:  2 mg


   Documented by: 


Non-Formulary Medication (Cholecalciferol (Vitamin D3) [Vitamin D3])  400 unit 

PO BEDTIME [facility]


   Last Admin: 08/13/20 22:38 Dose:  Not Given


   Documented by: 


Non-Formulary Medication (Fluocinolone Acetonide [Fluocinolone Acetonide])  4 

drop TOP ASDIRECTED PRN


   PRN Reason: Dizziness


Non-Formulary Medication (Peppermint Oil Cap)  1 cap PO BEDTIME [facility]


   Last Admin: 08/13/20 22:38 Dose:  Not Given


   Documented by: 


Potassium Chloride (Klor-Con M20)  20 meq PO BID [facility]


   Last Admin: 08/15/20 08:18 Dose:  20 meq


   Documented by: 


Spironolactone (Aldactone)  12.5 mg PO DAILY [facility]


Warfarin Sodium (Coumadin)  5 mg PO ONETIME ONE


   Stop: 08/14/20 16:01


   Last Admin: 08/14/20 16:19 Dose:  5 mg


   Documented by: 











- Exam


Quality Assessment: Supplemental Oxygen


General: Alert


Neck: Supple


Lungs: Clear to Auscultation


Cardiovascular: Regular Rate, Irregular Rhythm


GI/Abdominal Exam: Normal Bowel Sounds, Distended


Extremities: Normal Inspection


Neurological: No New Focal Deficit, Other (tremor)


Psy/Mental Status: Alert, Normal Affect





Sepsis Event Note





- Evaluation


Sepsis Screening Result: No Definite Risk





- Focused Exam


Vital Signs: 


                                   Vital Signs











  Temp Pulse Resp BP BP Pulse Ox Pulse Ox


 


 08/15/20 08:26     122/65   


 


 08/15/20 01:00  97.6 F  71  20   129/78  99 


 


 08/15/20 00:59        99














- Problem List & Annotations


(1) Chronic systolic congestive heart failure, NYHA class 3


SNOMED Code(s): 689775812, 967527840, 246551248


   Code(s): I50.22 - CHRONIC SYSTOLIC (CONGESTIVE) HEART FAILURE   Status: 

Chronic   Current Visit: Yes   





(2) Ischemic cardiomyopathy


SNOMED Code(s): 031829617


   Code(s): I25.5 - ISCHEMIC CARDIOMYOPATHY   Status: Acute   Current Visit: Yes

  





(3) Declining functional status


SNOMED Code(s): 612868231269909


   Code(s): R53.81 - OTHER MALAISE   Status: Acute   Current Visit: Yes   





(4) Diverticulitis


SNOMED Code(s): 624747089


   Code(s): K57.92 - DVTRCLI OF INTEST, PART UNSP, W/O PERF OR ABSCESS W/O BLEED

  Status: Acute   Current Visit: Yes   Annotation/Comment:: Rocephin & Flagyl 

day 2.   





(5) Diabetes mellitus type 2, diet-controlled


SNOMED Code(s): 994529602389141, 232325770324219


   Code(s): E11.9 - TYPE 2 DIABETES MELLITUS WITHOUT COMPLICATIONS   Status: 

Chronic   Current Visit: Yes   





(6) Hypokalemia


SNOMED Code(s): 51619643


   Code(s): E87.6 - HYPOKALEMIA   Status: Acute   Current Visit: Yes   





(7) Parkinson disease


SNOMED Code(s): 54841681


   Code(s): G20 - PARKINSON'S DISEASE   Status: Acute   Current Visit: Yes   





- Problem List Review


Problem List Initiated/Reviewed/Updated: Yes





- My Orders


Last 24 Hours: 


My Active Orders





08/14/20 Lunch


Clear Liquid Diet [DIET] 





08/15/20 14:00


Potassium Chloride [Klor-Con M20]   40 meq PO TID 





08/15/20 16:00


Warfarin [Coumadin]   5 mg PO ONETIME ONE 





08/16/20 05:11


BASIC METABOLIC PANEL,BMP [CHEM] AM 


CBC WITH AUTO DIFF [HEME] AM 


PRO B-TYPE NATRIUR PEPT,BNPPRO [CHEM] DAILY 














- Plan


Plan:: 


Keep in clear diet. Continue IV abx,and IV Lasix. Optimize K replacement.Repeat 

CBC,CMP tomorrow.I appreciate PT input.

## 2020-08-16 RX ADMIN — METOPROLOL SUCCINATE SCH MG: 100 TABLET, FILM COATED, EXTENDED RELEASE ORAL at 20:08

## 2020-08-16 RX ADMIN — METRONIDAZOLE SCH MLS/HR: 500 SOLUTION INTRAVENOUS at 03:03

## 2020-08-16 RX ADMIN — Medication PRN ML: at 03:04

## 2020-08-16 RX ADMIN — POTASSIUM CHLORIDE SCH MEQ: 1500 TABLET, EXTENDED RELEASE ORAL at 08:33

## 2020-08-16 RX ADMIN — POTASSIUM CHLORIDE SCH MEQ: 1500 TABLET, EXTENDED RELEASE ORAL at 20:08

## 2020-08-16 RX ADMIN — POTASSIUM CHLORIDE SCH: 1500 TABLET, EXTENDED RELEASE ORAL at 09:33

## 2020-08-16 NOTE — PCM.PN
- General Info


Date of Service: 08/16/20


Subjective Update: 


Sylwia feels weak. She also has some diarrhea and chronic abdominal pain. No 

fever or cough. She has no chest pain today.


Functional Status: Reports: Pain Controlled





- Review of Systems


HEENT: Reports: No Symptoms


Pulmonary: Reports: Shortness of Breath


Gastrointestinal: Reports: Abdominal Pain


Genitourinary: Reports: No Symptoms


Musculoskeletal: Reports: No Symptoms





- Patient Data


Vitals - Most Recent: 


                                Last Vital Signs











Temp  98 F   08/15/20 23:30


 


Pulse  75   08/15/20 23:30


 


Resp  18   08/15/20 23:30


 


BP  143/82 H  08/16/20 08:32


 


Pulse Ox  100   08/16/20 00:35











Weight - Most Recent: 78.97 kg


I&O - Last 24 Hours: 


                                 Intake & Output











 08/15/20 08/16/20 08/16/20





 22:59 06:59 14:59


 


Intake Total 400  


 


Output Total 500 600 


 


Balance -100 -600 











Lab Results Last 24 Hours: 


                         Laboratory Results - last 24 hr











  08/16/20 08/16/20 08/16/20 Range/Units





  06:45 06:45 06:45 


 


WBC    5.6  (4.5-12.0)  X10-3/uL


 


RBC    3.81  (3.23-5.20)  x10(6)uL


 


Hgb    11.8  (11.5-15.5)  g/dL


 


Hct    36.1  (30.0-51.3)  %


 


MCV    94.7  (80-96)  fL


 


MCH    31.0  (27.7-33.6)  pg


 


MCHC    32.7  (32.2-35.4)  g/dL


 


RDW    12.2  (11.5-15.5)  %


 


Plt Count    164  (125-369)  X10(3)uL


 


MPV    10.5 H  (7.4-10.4)  fL


 


Neut % (Auto)    58.0  (46-82)  %


 


Lymph % (Auto)    26.4  (13-37)  %


 


Mono % (Auto)    10.6  (4-12)  %


 


Eos % (Auto)    4  (1.0-5.0)  %


 


Baso % (Auto)    1  (0-2)  %


 


Neut # (Auto)    3.2  (1.6-8.3)  #


 


Lymph # (Auto)    1.5  (0.6-5.0)  #


 


Mono # (Auto)    0.6  (0.0-1.3)  #


 


Eos # (Auto)    0.2  (0.0-0.8)  #


 


Baso # (Auto)    0.1  (0.0-0.2)  #


 


PT  32.9 H    (9.0-11.1)  sec


 


INR  3.30 H    (1.00-1.24)  


 


Sodium     (135-145)  mmol/L


 


Potassium     (3.5-5.3)  mmol/L


 


Chloride     (100-110)  mmol/L


 


Carbon Dioxide     (21-32)  mmol/L


 


BUN     (7-18)  mg/dL


 


Creatinine     (0.55-1.02)  mg/dL


 


Est Cr Clr Drug Dosing     mL/min


 


Estimated GFR (MDRD)     (>60)  


 


BUN/Creatinine Ratio     (9-20)  


 


Glucose     ()  mg/dL


 


Calcium     (8.6-10.2)  mg/dL


 


NT-Pro-B Natriuret Pep   5867 H*   (<=450)  pg/mL














  08/16/20 Range/Units





  06:45 


 


WBC   (4.5-12.0)  X10-3/uL


 


RBC   (3.23-5.20)  x10(6)uL


 


Hgb   (11.5-15.5)  g/dL


 


Hct   (30.0-51.3)  %


 


MCV   (80-96)  fL


 


MCH   (27.7-33.6)  pg


 


MCHC   (32.2-35.4)  g/dL


 


RDW   (11.5-15.5)  %


 


Plt Count   (125-369)  X10(3)uL


 


MPV   (7.4-10.4)  fL


 


Neut % (Auto)   (46-82)  %


 


Lymph % (Auto)   (13-37)  %


 


Mono % (Auto)   (4-12)  %


 


Eos % (Auto)   (1.0-5.0)  %


 


Baso % (Auto)   (0-2)  %


 


Neut # (Auto)   (1.6-8.3)  #


 


Lymph # (Auto)   (0.6-5.0)  #


 


Mono # (Auto)   (0.0-1.3)  #


 


Eos # (Auto)   (0.0-0.8)  #


 


Baso # (Auto)   (0.0-0.2)  #


 


PT   (9.0-11.1)  sec


 


INR   (1.00-1.24)  


 


Sodium  138  (135-145)  mmol/L


 


Potassium  4.0  D  (3.5-5.3)  mmol/L


 


Chloride  99 L  (100-110)  mmol/L


 


Carbon Dioxide  34 H  (21-32)  mmol/L


 


BUN  14  (7-18)  mg/dL


 


Creatinine  1.2 H  (0.55-1.02)  mg/dL


 


Est Cr Clr Drug Dosing  23.28  mL/min


 


Estimated GFR (MDRD)  42 L  (>60)  


 


BUN/Creatinine Ratio  11.7  (9-20)  


 


Glucose  226 H  ()  mg/dL


 


Calcium  8.0 L  (8.6-10.2)  mg/dL


 


NT-Pro-B Natriuret Pep   (<=450)  pg/mL











Med Orders - Current: 


                               Current Medications





Acetaminophen (Tylenol Extra Strength)  1,000 mg PO BEDTIME [facility]


   Last Admin: 08/15/20 21:04 Dose:  1,000 mg


   Documented by: 


Acetaminophen (Tylenol Extra Strength)  500 mg PO DAILY [facility]


   Last Admin: 08/16/20 08:33 Dose:  500 mg


   Documented by: 


Ascorbic Acid (Vitamin C)  1,000 mg PO BEDTIME [facility]


   Last Admin: 08/15/20 21:04 Dose:  1,000 mg


   Documented by: 


Bisacodyl (Dulcolax)  5 mg PO DAILY PRN


   PRN Reason: Constipation


Coenzyme Q10 (Coenzyme Q10)  100 mg PO QPM [facility]


   Last Admin: 08/15/20 16:29 Dose:  100 mg


   Documented by: 


Docusate Sodium (Colace)  100 mg PO BID PRN


   PRN Reason: Constipation


Furosemide (Lasix)  40 mg PO BIDDIURETIC [facility]


Isosorbide Mononitrate (Imdur)  90 mg PO DAILY [facility]


   Last Admin: 08/16/20 08:32 Dose:  90 mg


   Documented by: 


Isosorbide Mononitrate (Imdur)  30 mg PO DAILY@1600 [facility]


   Last Admin: 08/15/20 16:28 Dose:  30 mg


   Documented by: 


Levofloxacin (Levaquin)  250 mg PO Q24H [facility]


Metoprolol Succinate (Toprol Xl)  100 mg PO BEDTIME [facility]


   Last Admin: 08/15/20 21:03 Dose:  100 mg


   Documented by: 


Metronidazole (Metronidazole)  250 mg PO Q8H [facility]


Multivitamins/Minerals/Vitamin C (Tab-A-Daniel)  1 tab PO DAILY [facility]


   Last Admin: 08/16/20 08:33 Dose:  1 tab


   Documented by: 


Nitroglycerin (Nitrostat)  0.4 mg SL Q5M PRN


   PRN Reason: Chest Pain


Ondansetron HCl (Zofran)  4 mg IV Q4H PRN


   PRN Reason: Nausea/Vomiting


Potassium Chloride (Klor-Con M20)  20 meq PO BID [facility]


Sodium Chloride (Saline Flush)  10 ml FLUSH ASDIRECTED PRN


   PRN Reason: Keep Vein Open


   Last Admin: 08/16/20 03:04 Dose:  10 ml


   Documented by: 


Triamcinolone Acetonide (Triamcinolone Acetonide 0.1% Crm)  0 gm TOP BID PRN


   PRN Reason: Rash


Warfarin Sodium (Coumadin Sliding Scale)  1 each PO ASDIRECTED [facility]





Discontinued Medications





Acetaminophen (Tylenol Extra Strength)  500 mg PO BID [facility]


   Stop: 08/13/20 12:00


   Last Admin: 08/13/20 09:59 Dose:  500 mg


   Documented by: 


Aspirin (Aspirin)  324 mg PO ONETIME ONE


   Stop: 08/12/20 14:26


   Last Admin: 08/12/20 14:54 Dose:  324 mg


   Documented by: 


Ceftriaxone Sodium (Rocephin)  1 gm IVPUSH Q24H [facility]


   Last Admin: 08/15/20 18:18 Dose:  1 gm


   Documented by: 


Diatrizoate Meglum/Diatrizoate Sod (Gastrografin 37%)  30 ml PO .AS DIRECTED ONE


   Stop: 08/12/20 15:42


   Last Admin: 08/12/20 22:37 Dose:  Not Given


   Documented by: 


Diltiazem HCl (Diltiazem)  25 mg IVPUSH ONETIME ONE


   Stop: 08/12/20 14:26


   Last Admin: 08/12/20 14:48 Dose:  25 mg


   Documented by: 


Furosemide (Lasix)  40 mg IVPUSH BID [facility]


   Last Admin: 08/15/20 20:56 Dose:  40 mg


   Documented by: 


Furosemide (Lasix)  40 mg IVPUSH BIDDIURETIC [facility]


   Last Admin: 08/16/20 08:31 Dose:  40 mg


   Documented by: 


Metronidazole 500 mg/ Premix  100 mls @ 100 mls/hr IV Q8H [facility]


   Last Admin: 08/16/20 03:03 Dose:  100 mls/hr


   Documented by: 


Metolazone (Zaroxolyn)  2.5 mg PO ONETIME ONE


   Stop: 08/12/20 18:21


   Last Admin: 08/12/20 20:33 Dose:  2.5 mg


   Documented by: 


Metolazone (Zaroxolyn) Confirm Administered Dose 2.5 mg .ROUTE .STK-MED ONE


   Stop: 08/12/20 20:32


   Last Admin: 08/12/20 20:34 Dose:  Not Given


   Documented by: 


Morphine Sulfate (Morphine)  2 mg IVPUSH ONETIME ONE


   Stop: 08/12/20 14:27


   Last Admin: 08/12/20 14:48 Dose:  2 mg


   Documented by: 


Non-Formulary Medication (Cholecalciferol (Vitamin D3) [Vitamin D3])  400 unit 

PO BEDTIME [facility]


   Last Admin: 08/13/20 22:38 Dose:  Not Given


   Documented by: 


Non-Formulary Medication (Fluocinolone Acetonide [Fluocinolone Acetonide])  4 

drop TOP ASDIRECTED PRN


   PRN Reason: Dizziness


Non-Formulary Medication (Peppermint Oil Cap)  1 cap PO BEDTIME [facility]


   Last Admin: 08/13/20 22:38 Dose:  Not Given


   Documented by: 


Potassium Chloride (Klor-Con M20)  20 meq PO BID [facility]


   Last Admin: 08/15/20 08:18 Dose:  20 meq


   Documented by: 


Potassium Chloride (Klor-Con M20)  40 meq PO TID [facility]


   Last Admin: 08/16/20 08:33 Dose:  40 meq


   Documented by: 


Spironolactone (Aldactone)  12.5 mg PO DAILY [facility]


Warfarin Sodium (Coumadin)  5 mg PO ONETIME ONE


   Stop: 08/14/20 16:01


   Last Admin: 08/14/20 16:19 Dose:  5 mg


   Documented by: 


Warfarin Sodium (Coumadin)  5 mg PO ONETIME ONE


   Stop: 08/15/20 16:01


   Last Admin: 08/15/20 16:28 Dose:  5 mg


   Documented by: 











- Exam


Quality Assessment: Supplemental Oxygen


General: Alert


Neck: Supple


Lungs: Clear to Auscultation


Cardiovascular: Regular Rate


Neurological: No New Focal Deficit, Other (Tremors)


Psy/Mental Status: Alert





Sepsis Event Note





- Evaluation


Sepsis Screening Result: No Definite Risk





- Focused Exam


Vital Signs: 


                                   Vital Signs











  Temp Pulse Pulse Resp BP BP Pulse Ox


 


 08/16/20 08:32      143/82 H  


 


 08/16/20 00:35        100


 


 08/15/20 23:30  98 F   75  18   136/74 


 


 08/15/20 21:03   76    138/76  














- Problem List & Annotations


(1) Chronic systolic congestive heart failure, NYHA class 3


SNOMED Code(s): 711840569, 364068143, 942733208


   Code(s): I50.22 - CHRONIC SYSTOLIC (CONGESTIVE) HEART FAILURE   Status: 

Chronic   Current Visit: Yes   





(2) Ischemic cardiomyopathy


SNOMED Code(s): 978864002


   Code(s): I25.5 - ISCHEMIC CARDIOMYOPATHY   Status: Acute   Current Visit: Yes

  





(3) Declining functional status


SNOMED Code(s): 048768872836999


   Code(s): R53.81 - OTHER MALAISE   Status: Acute   Current Visit: Yes   





(4) Diverticulitis


SNOMED Code(s): 392593371


   Code(s): K57.92 - DVTRCLI OF INTEST, PART UNSP, W/O PERF OR ABSCESS W/O BLEED

  Status: Acute   Current Visit: Yes   Annotation/Comment:: Rocephin & Flagyl 

day 2.   





(5) Diabetes mellitus type 2, diet-controlled


SNOMED Code(s): 978708562711482, 996276582905422


   Code(s): E11.9 - TYPE 2 DIABETES MELLITUS WITHOUT COMPLICATIONS   Status: 

Chronic   Current Visit: Yes   





(6) Hypokalemia


SNOMED Code(s): 81030449


   Code(s): E87.6 - HYPOKALEMIA   Status: Acute   Current Visit: Yes   





(7) Parkinson disease


SNOMED Code(s): 29719091


   Code(s): G20 - PARKINSON'S DISEASE   Status: Acute   Current Visit: Yes   





- Problem List Review


Problem List Initiated/Reviewed/Updated: Yes





- My Orders


Last 24 Hours: 


My Active Orders





08/16/20 09:00


Potassium Chloride [Klor-Con M20]   20 meq PO BID 


levoFLOXacin [Levaquin]   250 mg PO Q24H 


metroNIDAZOLE   250 mg PO Q8H 





08/16/20 Lunch


Heart Healthy Diet [DIET] 





08/16/20 14:00


Furosemide [Lasix]   40 mg PO BIDDIURETIC 














- Plan


Plan:: 


Hypokalemia has improved. Reduce replacement. Advance diet. Switch to oral abx. 

Possible DC to [facility] tomorrow

## 2020-08-17 ENCOUNTER — HOSPITAL ENCOUNTER (INPATIENT)
Dept: HOSPITAL 7 - FB.MS | Age: 85
LOS: 4 days | Discharge: HOME HEALTH | DRG: 948 | End: 2020-08-21
Attending: FAMILY MEDICINE | Admitting: FAMILY MEDICINE
Payer: MEDICARE

## 2020-08-17 DIAGNOSIS — I48.11: ICD-10-CM

## 2020-08-17 DIAGNOSIS — E11.9: ICD-10-CM

## 2020-08-17 DIAGNOSIS — Z79.01: ICD-10-CM

## 2020-08-17 DIAGNOSIS — I69.954: ICD-10-CM

## 2020-08-17 DIAGNOSIS — Z88.2: ICD-10-CM

## 2020-08-17 DIAGNOSIS — I50.22: ICD-10-CM

## 2020-08-17 DIAGNOSIS — Z79.899: ICD-10-CM

## 2020-08-17 DIAGNOSIS — I25.5: ICD-10-CM

## 2020-08-17 DIAGNOSIS — N28.9: ICD-10-CM

## 2020-08-17 DIAGNOSIS — G20: ICD-10-CM

## 2020-08-17 DIAGNOSIS — R07.89: ICD-10-CM

## 2020-08-17 DIAGNOSIS — F41.9: ICD-10-CM

## 2020-08-17 DIAGNOSIS — Z88.0: ICD-10-CM

## 2020-08-17 DIAGNOSIS — K57.92: ICD-10-CM

## 2020-08-17 DIAGNOSIS — I11.0: ICD-10-CM

## 2020-08-17 DIAGNOSIS — Z66: ICD-10-CM

## 2020-08-17 DIAGNOSIS — R53.81: Primary | ICD-10-CM

## 2020-08-17 DIAGNOSIS — E87.6: ICD-10-CM

## 2020-08-17 DIAGNOSIS — F32.9: ICD-10-CM

## 2020-08-17 RX ADMIN — POTASSIUM CHLORIDE SCH MEQ: 1500 TABLET, EXTENDED RELEASE ORAL at 20:05

## 2020-08-17 RX ADMIN — POTASSIUM CHLORIDE SCH MEQ: 1500 TABLET, EXTENDED RELEASE ORAL at 09:40

## 2020-08-17 RX ADMIN — METOPROLOL SUCCINATE SCH MG: 100 TABLET, FILM COATED, EXTENDED RELEASE ORAL at 20:05

## 2020-08-17 NOTE — PCM.HP.2
H&P History of Present Illness





- General


Date of Service: 20


Admit Problem/Dx: 


                           Admission Diagnosis/Problem





Admission Diagnosis/Problem      Weakness








Source of Information: Patient


History Limitations: Reports: No Limitations





- History of Present Illness


Initial Comments - Free Text/Narative: 


Sylwia is an 88-year-old female who was admitted to swing bed today. She was 

in the medical bed, with ischemic cardiomyopathy, acute diverticulitis, CHF 

exacerbation. She is improved but still weak to return home independently. Her 

history also includes HTN, hypokalemia, Parkinson's disease, and type 2 

diabetes.





- Related Data


Allergies/Adverse Reactions: 


                                    Allergies











Allergy/AdvReac Type Severity Reaction Status Date / Time


 


Penicillins Allergy  Rash Verified 20 19:22


 


Sulfa (Sulfonamide Allergy  Rash Verified 20 19:22





Antibiotics)     











Home Medications: 


                                    Home Meds





Docusate Sodium 100 mg PO BID PRN 19 [History]


Fluocinolone Acetonide 4 drop EARBOTH ASDIRECTED PRN 19 [History]


Isosorbide Mononitrate [Imdur] 90 mg PO DAILY 19 [History]


Metoprolol Succinate [Toprol XL 100mg] 100 mg PO BEDTIME 19 [History]


Nitroglycerin 0.4 mg SL Q5M PRN 19 [History]


Peppermint Oil Cap 1 cap PO BEDTIME 19 [History]


Triamcinolone Acetonide [Triamcinolone Acetonide 0.1% Crm] 1 applic TOP BID PRN 

19 [History]


Ubidecarenone [Coenzyme Q10] 100 mg PO QPM 19 [History]


Warfarin [Coumadin] 5 mg PO DAILY 19 [History]


lisinopriL [Zestril] 10 mg PO DAILY 19 [History]


nitrofurantoin macrocrystaL [Macrodantin] 50 mg PO MOWEFR@2100 19 

[History]


Acetaminophen [Tylenol Extra Strength] 1,000 mg PO BEDTIME 20 [History]


Cholecalciferol (Vitamin D3) [Vitamin D3] 10 mcg PO DAILY 20 [History]


Acetaminophen [Tylenol Extra Strength] 500 mg PO DAILY  tablet 20 [Rx]


Ascorbic Acid [Vitamin C] 1,000 mg PO BEDTIME  tablet 20 [Rx]


Furosemide [Lasix] 40 mg PO BIDDIURETIC  tablet 20 [Rx]


Isosorbide Mononitrate [Imdur] 30 mg PO DAILY@1600  tab.er 20 [Rx]


Multivitamins [Tab-A-Daniel] 1 tab PO DAILY  tablet 20 [Rx]


Potassium Chloride [Klor-Con M20] 20 meq PO BID  tab.er 20 [Rx]


bisacodyL [Dulcolax] 5 mg PO DAILY PRN  tablet 20 [Rx]


levoFLOXacin [Levaquin] 250 mg PO Q24H  tablet 20 [Rx]


metroNIDAZOLE [Flagyl] 500 mg PO Q8H  tablet 20 [Rx]











Past Medical History


HEENT History: Reports: Glaucoma, Macular Degeneration


Cardiovascular History: Reports: Afib, Heart Failure, Hypertension, MI, Stents


Respiratory History: Reports: Pneumonia, Recurrent


Gastrointestinal History: Reports: Diverticulosis


Genitourinary History: Reports: None


OB/GYN History: Reports: Pregnancy


Other OB/BYN History: 


Musculoskeletal History: Reports: Arthritis


Neurological History: Reports: Concussion, CVA, Migraines, Parkinson's, Vertigo


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


Psychiatric History: Reports: Anxiety, Depression


Endocrine/Metabolic History: Reports: Diabetes, Type II, Obesity/BMI 30+


Hematologic History: Reports: Anticoagulation Therapy





- Infectious Disease History


Infectious Disease History: Reports: Shingles





- Past Surgical History


HEENT Surgical History: Reports: Cataract Surgery


Other HEENT Surgeries/Procedures: bilat cataract


Cardiovascular Surgical History: Reports: Coronary Artery Stent


GI Surgical History: Reports: Colonoscopy, Other (See Below)


Other GI Surgeries/Procedures: exp lap


Female  Surgical History: Reports: Hysterectomy





Social & Family History





- Family History


Family Medical History: Noncontributory





- Caffeine Use


Caffeine Use: Reports: Coffee, Soda





H&P Review of Systems





- Review of Systems:


Review Of Systems: Comprehensive ROS is negative, except as noted in HPI.





Exam





- Exam


Exam: See Below





- Exam


Quality Assessment: Supplemental Oxygen


General: Alert


HEENT: PERRLA


Neck: Supple


Lungs: Clear to Auscultation


Cardiovascular: Regular Rate


GI/Abdominal Exam: Normal Bowel Sounds, Soft


Rectal (Female) Exam: Deferred


Extremities: Normal Inspection


Skin: Warm


Neurological: Other (Tremors at rest)


Neuro Extensive - Mental Status: Alert, Oriented x3


Neuro Extensive - Motor, Sensory, Reflexes: CN II-XII Intact


Psychiatric: Alert





- Problem List


(1) Debility


SNOMED Code(s): 94535107


   ICD Code: R53.81 - OTHER MALAISE   Status: Acute   Current Visit: Yes   





(2) Ischemic cardiomyopathy


SNOMED Code(s): 730664097


   ICD Code: I25.5 - ISCHEMIC CARDIOMYOPATHY   Status: Acute   Current Visit: 

Yes   





(3) Acute diverticulitis


SNOMED Code(s): 104179305


   ICD Code: K57.92 - DVTRCLI OF INTEST, PART UNSP, W/O PERF OR ABSCESS W/O 

BLEED   Status: Acute   Current Visit: Yes   





(4) Parkinson disease


SNOMED Code(s): 36508269


   ICD Code: G20 - PARKINSON'S DISEASE   Status: Acute   Current Visit: No   





(5) Afib


SNOMED Code(s): 11822135


   ICD Code: I48.91 - UNSPECIFIED ATRIAL FIBRILLATION   Status: Chronic   

Current Visit: No   


Qualifiers: 


   Atrial fibrillation type: longstanding persistent   Qualified Code(s): I48.11

 - Longstanding persistent atrial fibrillation   





(6) Diabetes mellitus type 2, diet-controlled


SNOMED Code(s): 190042320937536, 732886587521350


   ICD Code: E11.9 - TYPE 2 DIABETES MELLITUS WITHOUT COMPLICATIONS   Status: 

Chronic   Current Visit: No   


Problem List Initiated/Reviewed/Updated: Yes


Orders Last 24hrs: 


                               Active Orders 24 hr











 Category Date Time Status


 


 Patient Status [ADT] Routine ADT  20 11:14 Active


 


 Height and Weight [RC] WEEKLY Care  20 11:14 Active


 


 Oxygen Therapy [RC] PRN Care  20 11:14 Active


 


 Up With Assistance [RC] ASDIRECTED Care  20 11:14 Active


 


 VTE/DVT Education [RC] Per Unit Routine Care  20 11:14 Active


 


 Vital Signs [RC] PER UNIT ROUTINE Care  20 11:14 Active


 


 OT Evaluation and Treatment [CONS] Routine Cons  20 11:14 Active


 


 PT Evaluation and Treatment [CONS] Routine Cons  20 11:14 Active


 


 Regular Diet [DIET] Diet  20 Breakfast Active


 


 Resuscitation Status Routine Resus Stat  20 11:14 Ordered











Assessment/Plan Comment:: 


Admit to swing bed. Continue oral antibiotics, and oral diuretics. Consulted 

physical and occupational therapy.She still wants,and family too,to return home 

independently.

## 2020-08-17 NOTE — DISCH
DISCHARGE DATE:  08/17/2020

 

REASON FOR ADMISSION:

1. Shortness of breath on exertion.

2. Ischemic cardiomyopathy.

3. Atrial fibrillation.

4. Diverticulitis, acute.

5. Type 2 diabetes.

6. Parkinson disease.

7. Debility and generalized weakness.

 

DISCHARGE DIAGNOSES:

1. Shortness of breath on exertion.

2. Ischemic cardiomyopathy.

3. Atrial fibrillation.

4. Diverticulitis, acute.

5. Type 2 diabetes.

6. Parkinson disease.

7. Debility and generalized weakness.

 

BRIEF HISTORY:  This is a pleasant 88-year-old female who has a history of

chronic CHF, ischemic cardiomyopathy, and AFib, was admitted for worsening

shortness of breath and fluid overload.  She lives alone.  She also had some

abdominal pain and thought to be due to diverticulitis.  She was given Lasix 40

mg IV b.i.d. and also started on IV antibiotics, Rocephin, and Flagyl.  She was

then eventually switched to oral medications.  She is deemed too weak to return

home and attempts to discuss other placement was not acceptable to her.  As

such, a decision was made to admit her to swing bed for rehab, physical therapy,

and to be able to return to home independently.  She was discharged on Flagyl

and oral ciprofloxacin, and will continue the Lasix orally 40 mg IV b.i.d.  The

rest of the home medications were continued as previously scheduled.

 

Please note that I spent more than 35 minutes in the discharge of the patient.

 

Job#: [id number]

DD: 08/17/2020 1019

DT: 08/17/2020 2035 TN/FRANCISCA

## 2020-08-18 RX ADMIN — POTASSIUM CHLORIDE SCH MEQ: 1500 TABLET, EXTENDED RELEASE ORAL at 08:18

## 2020-08-18 RX ADMIN — METOPROLOL SUCCINATE SCH MG: 100 TABLET, FILM COATED, EXTENDED RELEASE ORAL at 20:38

## 2020-08-18 RX ADMIN — POTASSIUM CHLORIDE SCH MEQ: 1500 TABLET, EXTENDED RELEASE ORAL at 20:34

## 2020-08-18 NOTE — PCM.SN.2
- Free Text/Narrative


Note: 





several c/o





unable to sleep





feeling anxious, Ativan 0.5 mg IV x 1 did allow her to rest temporarily but did 

not remain asleep





states she is weak, yet vss and BP stable





states she is having 7/10 CP, yet HR 60s, BP ~137/74





trop neg, EKG neg for acute changes, in afib with Qs in all chest leads, no ST 

changes





after NTG SL x 1 CP better altho not gone, however decrease in BP ~123/54





pt currently on Imdur 90 mg qAM and 30 mg qPM, cannot safely give additional NTG

SL





ASSESS nonspecific CP with stable vs and neg trop/EKG





PLAN cannot exclude angina altho seems unlikely (no associated sxs, no evidence 

of adrenergic stress), current meds are at maximum tolerated doses, continue 

monitoring and medical management

## 2020-08-19 RX ADMIN — POTASSIUM CHLORIDE SCH MEQ: 1500 TABLET, EXTENDED RELEASE ORAL at 08:40

## 2020-08-19 RX ADMIN — POTASSIUM CHLORIDE SCH MEQ: 1500 TABLET, EXTENDED RELEASE ORAL at 20:40

## 2020-08-19 RX ADMIN — METOPROLOL SUCCINATE SCH MG: 100 TABLET, FILM COATED, EXTENDED RELEASE ORAL at 20:45

## 2020-08-20 RX ADMIN — POTASSIUM CHLORIDE SCH MEQ: 1500 TABLET, EXTENDED RELEASE ORAL at 08:23

## 2020-08-20 RX ADMIN — POTASSIUM CHLORIDE SCH MEQ: 1500 TABLET, EXTENDED RELEASE ORAL at 20:56

## 2020-08-20 RX ADMIN — METOPROLOL SUCCINATE SCH MG: 100 TABLET, FILM COATED, EXTENDED RELEASE ORAL at 20:56

## 2020-08-21 RX ADMIN — POTASSIUM CHLORIDE SCH MEQ: 1500 TABLET, EXTENDED RELEASE ORAL at 08:15

## 2020-08-21 NOTE — PCM.PN
- General Info


Date of Service: 08/21/20


Admission Dx/Problem (Free Text): 


Patient states she has little abdominal pain both right and left lower quadrant 

but it's improving. She was having diarrhea and the left scar in her stool 

started to form. She denies fevers, chills. She always has little shortness of 

breath and uses oxygen all the time.








- Patient Data


Vitals - Most Recent: 


                                Last Vital Signs











Temp  97.7 F   08/20/20 08:00


 


Pulse  66   08/20/20 20:56


 


Resp  18   08/20/20 08:00


 


BP  126/83   08/21/20 08:16


 


Pulse Ox  100   08/21/20 00:00











Weight - Most Recent: 175 lb 3 oz


I&O - Last 24 Hours: 


                                 Intake & Output











 08/20/20 08/21/20 08/21/20





 22:59 06:59 14:59


 


Intake Total 100  


 


Balance 100  











Lab Results Last 24 Hours: 


                         Laboratory Results - last 24 hr











  08/21/20 Range/Units





  06:30 


 


PT  38.9 H*  (9.0-11.1)  sec


 


INR  3.94 H  (1.00-1.24)  











Med Orders - Current: 


                               Current Medications





Acetaminophen (Tylenol Extra Strength)  500 mg PO DAILY [facility]


   Last Admin: 08/21/20 08:16 Dose:  500 mg


   Documented by: 


Acetaminophen (Tylenol Extra Strength)  1,000 mg PO BEDTIME [facility]


   Last Admin: 08/20/20 20:56 Dose:  1,000 mg


   Documented by: 


Ascorbic Acid (Vitamin C)  1,000 mg PO BEDTIME [facility]


   Last Admin: 08/20/20 20:56 Dose:  1,000 mg


   Documented by: 


Bisacodyl (Dulcolax)  5 mg PO DAILY PRN


   PRN Reason: Constipation


Coenzyme Q10 (Coenzyme Q10)  100 mg PO QPM [facility]


   Last Admin: 08/20/20 17:51 Dose:  100 mg


   Documented by: 


Docusate Sodium (Colace)  100 mg PO BID PRN


   PRN Reason: Constipation


Furosemide (Lasix)  40 mg PO BIDDIURETIC [facility]


   Last Admin: 08/21/20 08:12 Dose:  40 mg


   Documented by: 


Isosorbide Mononitrate (Imdur)  30 mg PO DAILY@1600 [facility]


   Last Admin: 08/20/20 16:41 Dose:  30 mg


   Documented by: 


Isosorbide Mononitrate (Imdur)  90 mg PO DAILY [facility]


   Last Admin: 08/21/20 08:16 Dose:  90 mg


   Documented by: 


Metoprolol Succinate (Toprol Xl)  100 mg PO BEDTIME [facility]


   Last Admin: 08/20/20 20:56 Dose:  100 mg


   Documented by: 


Metronidazole (Flagyl)  500 mg PO Q8H [facility]


   Stop: 08/22/20 09:01


   Last Admin: 08/21/20 08:13 Dose:  500 mg


   Documented by: 


Multivitamins/Minerals/Vitamin C (Tab-A-Daniel)  1 tab PO DAILY@1200 [facility]


   Last Admin: 08/20/20 12:10 Dose:  1 tab


   Documented by: 


Nitroglycerin (Nitrostat)  0.4 mg SL Q5M PRN


   PRN Reason: Chest Pain


Potassium Chloride (Klor-Con M20)  20 meq PO BID [facility]


   Last Admin: 08/21/20 08:15 Dose:  20 meq


   Documented by: 


Triamcinolone Acetonide (Triamcinolone Acetonide 0.1% Crm)  0 gm TOP BID PRN


   PRN Reason: Rash


Warfarin Sodium (Coumadin Sliding Scale)  1 each PO ASDIRECTED [facility]





Discontinued Medications





Levofloxacin (Levaquin)  250 mg PO Q24H [facility]


   Stop: 08/21/20 09:01


   Last Admin: 08/21/20 08:16 Dose:  250 mg


   Documented by: 


Lorazepam (Ativan)  0.5 mg PO ONETIME ONE


   Stop: 08/18/20 04:08


   Last Admin: 08/18/20 04:25 Dose:  0.5 mg


   Documented by: 


Multivitamins/Minerals/Vitamin C (Tab-A-Daniel)  1 tab PO DAILY [facility]


   Last Admin: 08/18/20 08:20 Dose:  1 tab


   Documented by: 


Nitroglycerin (Nitrostat)  0.4 mg SL ONETIME ONE


   Stop: 08/18/20 04:45


   Last Admin: 08/18/20 05:18 Dose:  0.4 mg


   Documented by: 


Warfarin Sodium (Coumadin)  2.5 mg PO 1600 [facility]


   Last Admin: 08/18/20 16:24 Dose:  2.5 mg


   Documented by: 


Warfarin Sodium (Coumadin)  5 mg PO ONETIME ONE


   Stop: 08/19/20 16:01


   Last Admin: 08/19/20 17:00 Dose:  5 mg


   Documented by: 


Warfarin Sodium (Coumadin)  2.5 mg PO ONETIME ONE


   Stop: 08/20/20 16:01


   Last Admin: 08/20/20 16:41 Dose:  2.5 mg


   Documented by: 











- Exam


General: Alert, Oriented, Cooperative


Lungs: Clear to Auscultation, Normal Respiratory Effort.  No: Crackles, Rales, 

Rhonchi


Cardiovascular: Regular Rate, No Murmurs, Irregular Rhythm


Extremities: No Pedal Edema





Sepsis Event Note





- Evaluation


Sepsis Screening Result: No Definite Risk





- Focused Exam


Vital Signs: 


                                   Vital Signs











  BP Pulse Ox


 


 08/21/20 08:16  126/83 


 


 08/21/20 00:00   100














- Problem List & Annotations


(1) Acute diverticulitis


SNOMED Code(s): 486649101


   Code(s): K57.92 - DVTRCLI OF INTEST, PART UNSP, W/O PERF OR ABSCESS W/O BLEED

  Status: Acute   Current Visit: Yes   





(2) Ischemic cardiomyopathy


SNOMED Code(s): 329422061


   Code(s): I25.5 - ISCHEMIC CARDIOMYOPATHY   Status: Acute   Current Visit: Yes

  





(3) Acute renal insufficiency


SNOMED Code(s): 976420708


   Code(s): N28.9 - DISORDER OF KIDNEY AND URETER, UNSPECIFIED   Status: Acute  

Current Visit: No   





(4) Declining functional status


SNOMED Code(s): 754686270437019


   Code(s): R53.81 - OTHER MALAISE   Status: Acute   Current Visit: No   





(5) Hypokalemia


SNOMED Code(s): 90637890


   Code(s): E87.6 - HYPOKALEMIA   Status: Acute   Current Visit: No   





(6) Parkinson disease


SNOMED Code(s): 69190997


   Code(s): G20 - PARKINSON'S DISEASE   Status: Acute   Current Visit: No   





(7) Weakness


SNOMED Code(s): 30020044


   Code(s): R53.1 - WEAKNESS   Status: Acute   Current Visit: No   





(8) Afib


SNOMED Code(s): 74246374


   Code(s): I48.91 - UNSPECIFIED ATRIAL FIBRILLATION   Status: Chronic   Current

Visit: No   


Qualifiers: 


   Atrial fibrillation type: longstanding persistent   Qualified Code(s): I48.11

- Longstanding persistent atrial fibrillation   





(9) Diabetes mellitus type 2, diet-controlled


SNOMED Code(s): 689414581843767, 695069716554503


   Code(s): E11.9 - TYPE 2 DIABETES MELLITUS WITHOUT COMPLICATIONS   Status: 

Chronic   Current Visit: No   





- Problem List Review


Problem List Initiated/Reviewed/Updated: Yes





- My Orders


Last 24 Hours: 


My Active Orders





08/21/20 08:47


Ready for Discharge [RC] PER UNIT ROUTINE 














- Plan


Plan:: 


Discharge to home with home health nurse.


Last day of Levaquin today. She needs more day and metronidazole.


Start 2.5 mg of Glucotrol XL once a day.

## 2020-08-21 NOTE — PCM.DCSUM1
**Discharge Summary





- Hospital Course


Free Text/Narrative:: 


Swing bed course-patient did well with PT/OT and increase in her strength and 

ambulation. It was discussed whether she should go to [facility] nursing home.

But she refuses. Blood sugars were in the 200s when she was here. She was 

treated with Rocephin initially and then Levaquin up to 10 days total. She is 

also put on metronidazole and she'll have to do one more course tomorrow when 

she leaves. She'll be sent home on home health with the nurse to help her out. 

Recheck in one week.





Brief History: Sylwia is an 88-year-old female who was admitted to swing bed 

today. She was in the medical bed, with ischemic cardiomyopathy, acute 

diverticulitis, CHF exacerbation. She is improved but still weak to return home 

independently. Her history also includes HTN, hypokalemia, Parkinson's disease, 

and type 2 diabetes.


Diagnosis: Stroke: No





- Discharge Data


Discharge Date: 08/21/20


Discharge Disposition: Home, W Home Health Agency 


Condition: Good





- Referral to Home Health


Date of Face to Face Encounter: 08/21/20


Reason for Homebound Status: Weakness, pneumonia, diverticulitis, diabetes


Primary Care Physician: 


Goldy Whitney MD





Skilled Need: Medication management, home safety, helps with cares and ADLs.





- Discharge Diagnosis/Problem(s)


(1) Acute diverticulitis


SNOMED Code(s): 641632347


   ICD Code: K57.92 - DVTRCLI OF INTEST, PART UNSP, W/O PERF OR ABSCESS W/O 

BLEED   Status: Acute   Current Visit: Yes   





(2) Ischemic cardiomyopathy


SNOMED Code(s): 558294163


   ICD Code: I25.5 - ISCHEMIC CARDIOMYOPATHY   Status: Acute   Current Visit: 

Yes   





(3) Acute renal insufficiency


SNOMED Code(s): 581522054


   ICD Code: N28.9 - DISORDER OF KIDNEY AND URETER, UNSPECIFIED   Status: Acute 

 Current Visit: No   





(4) Declining functional status


SNOMED Code(s): 939020322255658


   ICD Code: R53.81 - OTHER MALAISE   Status: Acute   Current Visit: No   





(5) Hypokalemia


SNOMED Code(s): 04312940


   ICD Code: E87.6 - HYPOKALEMIA   Status: Acute   Current Visit: No   





(6) Parkinson disease


SNOMED Code(s): 77472201


   ICD Code: G20 - PARKINSON'S DISEASE   Status: Acute   Current Visit: No   





(7) Weakness


SNOMED Code(s): 06951823


   ICD Code: R53.1 - WEAKNESS   Status: Acute   Current Visit: No   





(8) Afib


SNOMED Code(s): 21748748


   ICD Code: I48.91 - UNSPECIFIED ATRIAL FIBRILLATION   Status: Chronic   

Current Visit: No   


Qualifiers: 


   Atrial fibrillation type: longstanding persistent   Qualified Code(s): I48.11

- Longstanding persistent atrial fibrillation   





(9) Diabetes mellitus type 2, diet-controlled


SNOMED Code(s): 817369119129487, 643338199902437


   ICD Code: E11.9 - TYPE 2 DIABETES MELLITUS WITHOUT COMPLICATIONS   Status: 

Chronic   Current Visit: No   





- Patient Summary/Data


Consults: 


                                  Consultations





08/17/20 11:14


OT Evaluation and Treatment [CONS] Routine 


   Please Evaluate and Treat.


   OT Reason for Consult: ADL's


   This query below is only for informational purposes and is not editable.


PT Evaluation and Treatment [CONS] Routine 


   Please Evaluate and Treat.


   PT Reason for Consult: Ambulation


   This query below is only for informational purposes and is not editable.














- Patient Instructions


Diet: Diabetic Diet


Activity: As Tolerated


Driving: Do Not Drive


Notify Provider of: Fever, Increased Pain, Swelling and Redness, Nausea and/or 

Vomiting


Other/Special Instructions: 1. Recheck with Dr. Whitney in 1 week.  2. Hold 

Coumadin until Monday. Home health to draw the INR on Monday consented to the 

Coumadin clinic and then adjust Coumadin dose.  3. Home health for nursing.





- Discharge Plan


Prescriptions/Med Rec: 


metroNIDAZOLE [Flagyl] 500 mg PO Q8H #3 tablet


glipiZIDE [Glucotrol XL] 2.5 mg PO DAILY #30 tab.er.24


Furosemide [Lasix] 40 mg PO BIDDIURETIC #0 tablet


Home Medications: 


                                    Home Meds





Docusate Sodium 100 mg PO BID PRN 12/31/19 [History]


Fluocinolone Acetonide 4 drop EARBOTH ASDIRECTED PRN 12/31/19 [History]


Isosorbide Mononitrate [Imdur] 90 mg PO DAILY 12/31/19 [History]


Metoprolol Succinate [Toprol XL 100mg] 100 mg PO BEDTIME 12/31/19 [History]


Nitroglycerin 0.4 mg SL Q5M PRN 12/31/19 [History]


Peppermint Oil Cap 1 cap PO BEDTIME 12/31/19 [History]


Triamcinolone Acetonide [Triamcinolone Acetonide 0.1% Crm] 1 applic TOP BID PRN 

12/31/19 [History]


Ubidecarenone [Coenzyme Q10] 100 mg PO QPM 12/31/19 [History]


Warfarin [Coumadin] 5 mg PO DAILY 12/31/19 [History]


lisinopriL [Zestril] 10 mg PO DAILY 12/31/19 [History]


nitrofurantoin macrocrystaL [Macrodantin] 50 mg PO MOWEFR@2100 12/31/19 

[History]


Acetaminophen [Tylenol Extra Strength] 1,000 mg PO BEDTIME 08/13/20 [History]


Cholecalciferol (Vitamin D3) [Vitamin D3] 10 mcg PO DAILY 08/13/20 [History]


Acetaminophen [Tylenol Extra Strength] 500 mg PO DAILY  tablet 08/17/20 [Rx]


Ascorbic Acid [Vitamin C] 1,000 mg PO BEDTIME  tablet 08/17/20 [Rx]


Isosorbide Mononitrate [Imdur] 30 mg PO DAILY@1600  tab.er 08/17/20 [Rx]


Multivitamins [Tab-A-Daniel] 1 tab PO DAILY  tablet 08/17/20 [Rx]


bisacodyL [Dulcolax] 5 mg PO DAILY PRN  tablet 08/17/20 [Rx]


Furosemide [Lasix] 40 mg PO BIDDIURETIC #0 tablet 08/21/20 [Rx]


Furosemide [Lasix] 40 mg PO DAILY #0 tablet 08/21/20 [Rx]


glipiZIDE [Glucotrol XL] 2.5 mg PO DAILY #30 tab.er.24 08/21/20 [Rx]


metroNIDAZOLE [Flagyl] 500 mg PO Q8H #3 tablet 08/21/20 [Rx]











- Discharge Summary/Plan Comment


DC Time >30 min.: No





- Patient Data


Vitals - Most Recent: 


                                Last Vital Signs











Temp  97.7 F   08/20/20 08:00


 


Pulse  66   08/20/20 20:56


 


Resp  18   08/20/20 08:00


 


BP  126/83   08/21/20 08:16


 


Pulse Ox  100   08/21/20 00:00











Weight - Most Recent: 175 lb 3 oz


I&O - Last 24 hours: 


                                 Intake & Output











 08/20/20 08/21/20 08/21/20





 22:59 06:59 14:59


 


Intake Total 100  


 


Balance 100  











Lab Results - Last 24 hrs: 


                         Laboratory Results - last 24 hr











  08/21/20 Range/Units





  06:30 


 


PT  38.9 H*  (9.0-11.1)  sec


 


INR  3.94 H  (1.00-1.24)  











Med Orders - Current: 


                               Current Medications





Acetaminophen (Tylenol Extra Strength)  500 mg PO DAILY Replaced by [facility]


   Last Admin: 08/21/20 08:16 Dose:  500 mg


   Documented by: 


Acetaminophen (Tylenol Extra Strength)  1,000 mg PO BEDTIME Replaced by [facility]


   Last Admin: 08/20/20 20:56 Dose:  1,000 mg


   Documented by: 


Ascorbic Acid (Vitamin C)  1,000 mg PO BEDTIME Replaced by [facility]


   Last Admin: 08/20/20 20:56 Dose:  1,000 mg


   Documented by: 


Bisacodyl (Dulcolax)  5 mg PO DAILY PRN


   PRN Reason: Constipation


Coenzyme Q10 (Coenzyme Q10)  100 mg PO QPM Replaced by [facility]


   Last Admin: 08/20/20 17:51 Dose:  100 mg


   Documented by: 


Docusate Sodium (Colace)  100 mg PO BID PRN


   PRN Reason: Constipation


Furosemide (Lasix)  40 mg PO BIDDIURETIC Replaced by [facility]


   Last Admin: 08/21/20 08:12 Dose:  40 mg


   Documented by: 


Isosorbide Mononitrate (Imdur)  30 mg PO DAILY@1600 Replaced by [facility]


   Last Admin: 08/20/20 16:41 Dose:  30 mg


   Documented by: 


Isosorbide Mononitrate (Imdur)  90 mg PO DAILY Replaced by [facility]


   Last Admin: 08/21/20 08:16 Dose:  90 mg


   Documented by: 


Metoprolol Succinate (Toprol Xl)  100 mg PO BEDTIME Replaced by [facility]


   Last Admin: 08/20/20 20:56 Dose:  100 mg


   Documented by: 


Metronidazole (Flagyl)  500 mg PO Q8H Replaced by [facility]


   Stop: 08/22/20 09:01


   Last Admin: 08/21/20 08:13 Dose:  500 mg


   Documented by: 


Multivitamins/Minerals/Vitamin C (Tab-A-Daniel)  1 tab PO DAILY@1200 Replaced by [facility]


   Last Admin: 08/20/20 12:10 Dose:  1 tab


   Documented by: 


Nitroglycerin (Nitrostat)  0.4 mg SL Q5M PRN


   PRN Reason: Chest Pain


Potassium Chloride (Klor-Con M20)  20 meq PO BID Replaced by [facility]


   Last Admin: 08/21/20 08:15 Dose:  20 meq


   Documented by: 


Triamcinolone Acetonide (Triamcinolone Acetonide 0.1% Crm)  0 gm TOP BID PRN


   PRN Reason: Rash


Warfarin Sodium (Coumadin Sliding Scale)  1 each PO ASDIRECTED Replaced by [facility]





Discontinued Medications





Levofloxacin (Levaquin)  250 mg PO Q24H Replaced by [facility]


   Stop: 08/21/20 09:01


   Last Admin: 08/21/20 08:16 Dose:  250 mg


   Documented by: 


Lorazepam (Ativan)  0.5 mg PO ONETIME ONE


   Stop: 08/18/20 04:08


   Last Admin: 08/18/20 04:25 Dose:  0.5 mg


   Documented by: 


Multivitamins/Minerals/Vitamin C (Tab-A-Daniel)  1 tab PO DAILY Replaced by [facility]


   Last Admin: 08/18/20 08:20 Dose:  1 tab


   Documented by: 


Nitroglycerin (Nitrostat)  0.4 mg SL ONETIME ONE


   Stop: 08/18/20 04:45


   Last Admin: 08/18/20 05:18 Dose:  0.4 mg


   Documented by: 


Warfarin Sodium (Coumadin)  2.5 mg PO 1600 Replaced by [facility]


   Last Admin: 08/18/20 16:24 Dose:  2.5 mg


   Documented by: 


Warfarin Sodium (Coumadin)  5 mg PO ONETIME ONE


   Stop: 08/19/20 16:01


   Last Admin: 08/19/20 17:00 Dose:  5 mg


   Documented by: 


Warfarin Sodium (Coumadin)  2.5 mg PO ONETIME ONE


   Stop: 08/20/20 16:01


   Last Admin: 08/20/20 16:41 Dose:  2.5 mg


   Documented by:

## 2020-09-26 ENCOUNTER — HOSPITAL ENCOUNTER (INPATIENT)
Dept: HOSPITAL 7 - FB.ED | Age: 85
LOS: 3 days | Discharge: HOME HEALTH | DRG: 292 | End: 2020-09-29
Attending: FAMILY MEDICINE | Admitting: FAMILY MEDICINE
Payer: MEDICARE

## 2020-09-26 DIAGNOSIS — Z90.710: ICD-10-CM

## 2020-09-26 DIAGNOSIS — Z98.41: ICD-10-CM

## 2020-09-26 DIAGNOSIS — H40.9: ICD-10-CM

## 2020-09-26 DIAGNOSIS — I25.2: ICD-10-CM

## 2020-09-26 DIAGNOSIS — J44.9: ICD-10-CM

## 2020-09-26 DIAGNOSIS — J31.0: ICD-10-CM

## 2020-09-26 DIAGNOSIS — H35.30: ICD-10-CM

## 2020-09-26 DIAGNOSIS — R79.89: ICD-10-CM

## 2020-09-26 DIAGNOSIS — F41.9: ICD-10-CM

## 2020-09-26 DIAGNOSIS — E66.9: ICD-10-CM

## 2020-09-26 DIAGNOSIS — Z87.01: ICD-10-CM

## 2020-09-26 DIAGNOSIS — I48.11: ICD-10-CM

## 2020-09-26 DIAGNOSIS — Z99.81: ICD-10-CM

## 2020-09-26 DIAGNOSIS — I25.5: ICD-10-CM

## 2020-09-26 DIAGNOSIS — G20: ICD-10-CM

## 2020-09-26 DIAGNOSIS — I11.0: Primary | ICD-10-CM

## 2020-09-26 DIAGNOSIS — Z88.0: ICD-10-CM

## 2020-09-26 DIAGNOSIS — I69.354: ICD-10-CM

## 2020-09-26 DIAGNOSIS — G43.909: ICD-10-CM

## 2020-09-26 DIAGNOSIS — K57.90: ICD-10-CM

## 2020-09-26 DIAGNOSIS — Z98.42: ICD-10-CM

## 2020-09-26 DIAGNOSIS — Z88.2: ICD-10-CM

## 2020-09-26 DIAGNOSIS — K59.09: ICD-10-CM

## 2020-09-26 DIAGNOSIS — I69.954: ICD-10-CM

## 2020-09-26 DIAGNOSIS — I50.9: ICD-10-CM

## 2020-09-26 DIAGNOSIS — M19.90: ICD-10-CM

## 2020-09-26 DIAGNOSIS — Z79.01: ICD-10-CM

## 2020-09-26 DIAGNOSIS — Z79.899: ICD-10-CM

## 2020-09-26 DIAGNOSIS — E11.9: ICD-10-CM

## 2020-09-26 DIAGNOSIS — I48.91: ICD-10-CM

## 2020-09-26 DIAGNOSIS — Z79.84: ICD-10-CM

## 2020-09-26 DIAGNOSIS — E87.1: ICD-10-CM

## 2020-09-26 DIAGNOSIS — F32.9: ICD-10-CM

## 2020-09-26 DIAGNOSIS — Z95.5: ICD-10-CM

## 2020-09-26 DIAGNOSIS — Z51.5: ICD-10-CM

## 2020-09-26 DIAGNOSIS — S29.011A: ICD-10-CM

## 2020-09-26 RX ADMIN — HYDROCODONE BITARTRATE AND ACETAMINOPHEN PRN TAB: 5; 325 TABLET ORAL at 16:19

## 2020-09-26 RX ADMIN — METOPROLOL SUCCINATE SCH MG: 100 TABLET, FILM COATED, EXTENDED RELEASE ORAL at 20:05

## 2020-09-26 NOTE — EDM.PDOC
ED HPI GENERAL MEDICAL PROBLEM





- General


Chief Complaint: Respiratory Problem


Stated Complaint: NAUS AND VOM


Time Seen by Provider: 20 12:54


Source of Information: Reports: Patient


History Limitations: Reports: No Limitations





- History of Present Illness


INITIAL COMMENTS - FREE TEXT/NARRATIVE: 





pt c/o SOB,  weakness, poor appetite, constipation over the past week, no fever 

or chills or cough any other associated sx , pt has Hx of COPD/ CHF and she is 

O2 dependant , pt denies any other medical concerns. 


tells me she tried to go to bathroom 5 times yesterday but nothing came out, 

report Hx of constipation and use of stool softeners. 


Treatments PTA: Reports: EKG, Oxygen


  ** L midsternal chest


Pain Score (Numeric/FACES): 4





- Related Data


                                    Allergies











Allergy/AdvReac Type Severity Reaction Status Date / Time


 


Penicillins Allergy  Rash Verified 20 12:39


 


Sulfa (Sulfonamide Allergy  Rash Verified 20 12:39





Antibiotics)     











Home Meds: 


                                    Home Meds





Docusate Sodium 100 mg PO BID PRN 19 [History]


Fluocinolone Acetonide 4 drop EARBOTH ASDIRECTED PRN 19 [History]


Isosorbide Mononitrate [Imdur] 90 mg PO DAILY 19 [History]


Metoprolol Succinate [Toprol XL 100mg] 100 mg PO BEDTIME 19 [History]


Nitroglycerin 0.4 mg SL Q5M PRN 19 [History]


Peppermint Oil Cap 1 cap PO BEDTIME 19 [History]


Triamcinolone Acetonide [Triamcinolone Acetonide 0.1% Crm] 1 applic TOP BID PRN 

19 [History]


Ubidecarenone [Coenzyme Q10] 100 mg PO QPM 19 [History]


Warfarin [Coumadin] 5 mg PO DAILY 19 [History]


lisinopriL [Zestril] 10 mg PO DAILY 19 [History]


nitrofurantoin macrocrystaL [Macrodantin] 50 mg PO MOWEFR@2100 19 

[History]


Acetaminophen [Tylenol Extra Strength] 1,000 mg PO BEDTIME 20 [History]


Cholecalciferol (Vitamin D3) [Vitamin D3] 10 mcg PO DAILY 20 [History]


Acetaminophen [Tylenol Extra Strength] 500 mg PO DAILY  tablet 20 [Rx]


Ascorbic Acid [Vitamin C] 1,000 mg PO BEDTIME  tablet 20 [Rx]


Isosorbide Mononitrate [Imdur] 30 mg PO DAILY@1600  tab.er 20 [Rx]


Multivitamins [Tab-A-Daniel] 1 tab PO DAILY  tablet 20 [Rx]


bisacodyL [Dulcolax] 5 mg PO DAILY PRN  tablet 20 [Rx]


glipiZIDE [Glucotrol XL] 2.5 mg PO DAILY #30 tab.er.24 20 [Rx]


Furosemide [Lasix] 40 mg PO BID 20 [History]











Past Medical History


HEENT History: Reports: Glaucoma, Macular Degeneration


Cardiovascular History: Reports: Afib, Heart Failure, Hypertension, MI, Stents


Respiratory History: Reports: Pneumonia, Recurrent


Gastrointestinal History: Reports: Diverticulosis


Genitourinary History: Reports: None


OB/GYN History: Reports: Pregnancy


Other OB/GYN History: 


Musculoskeletal History: Reports: Arthritis


Neurological History: Reports: Concussion, CVA, Migraines, Parkinson's, Vertigo


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


Psychiatric History: Reports: Anxiety, Depression


Endocrine/Metabolic History: Reports: Diabetes, Type II, Obesity/BMI 30+


Hematologic History: Reports: Anticoagulation Therapy





- Infectious Disease History


Infectious Disease History: Reports: Shingles





- Past Surgical History


HEENT Surgical History: Reports: Cataract Surgery


Other HEENT Surgeries/Procedures: bilat cataract


Cardiovascular Surgical History: Reports: Coronary Artery Stent


GI Surgical History: Reports: Colonoscopy, Other (See Below)


Other GI Surgeries/Procedures: exp lap


Female  Surgical History: Reports: Hysterectomy





Social & Family History





- Family History


Family Medical History: Noncontributory





- Caffeine Use


Caffeine Use: Reports: Coffee, Soda





ED ROS GENERAL





- Review of Systems


Review Of Systems: See Below


Constitutional: Reports: No Symptoms


HEENT: Reports: No Symptoms


Respiratory: Reports: Shortness of Breath


Cardiovascular: Reports: No Symptoms


GI/Abdominal: Reports: Constipation.  Denies: Abdominal Pain, Nausea, Vomiting


: Reports: No Symptoms


Musculoskeletal: Reports: No Symptoms


Skin: Reports: No Symptoms





ED EXAM, GENERAL





- Physical Exam


Exam: See Below


Exam Limited By: No Limitations


General Appearance: Alert, No Apparent Distress


Eye Exam: Bilateral Eye: Normal Inspection


Nose: Normal Inspection


Throat/Mouth: Normal Oropharynx


Neck: Normal Inspection, Supple


Respiratory/Chest: Crackles


Cardiovascular: Irregularly Irregular


GI/Abdominal: Normal Bowel Sounds, Soft, Non-Tender, Distended


Back Exam: Normal Inspection, Full Range of Motion


Extremities: Normal Inspection, Normal Range of Motion


Neurological: Alert, Oriented, CN II-XII Intact


Skin Exam: Warm, Dry





Course





- Vital Signs


Text/Narrative:: 





pt had lg BM after fleet enema , her CXR shows mild edema and BNP is elevated, 

EKG shows afib with controlled rate, INR within therapeutic range. 


will admit pt for CHF exacerbation , she was given 80 mg lasix here and Dr Peterson was consulted and was in acceptance of pt care. 


Last Recorded V/S: 


                                Last Vital Signs











Temp  36.7 C   20 12:13


 


Pulse  75   20 13:00


 


Resp  20   20 13:00


 


BP  133/79   20 13:00


 


Pulse Ox  100   20 13:00














- Orders/Labs/Meds


Orders: 


                               Active Orders 24 hr











 Category Date Time Status


 


 Patient Status [ADT] Routine ADT  20 14:07 Ordered


 


 Antiembolic Devices [RC] .Routine Care  20 14:08 Ordered


 


 EKG Documentation Completion [RC] ASDIRECTED Care  20 13:16 Active


 


 Pulse Oximetry [RC] PRN Care  20 14:07 Ordered


 


 Up With Assistance [RC] ASDIRECTED Care  20 14:07 Ordered


 


 VTE/DVT Education [RC] Click to Edit Care  20 14:08 Ordered


 


 Vital Signs [RC] Q4H Care  20 14:07 Ordered


 


 Abdomen 2V AP Flat Upright [CR] Stat Exams  20 13:03 Taken


 


 Chest 1V Frontal [CR] Stat Exams  20 13:03 Taken


 


 INR,PT,PROTHROMBIN TIME [COAG] Routine Lab  20 14:15 Ordered


 


 Acetaminophen [Tylenol Extra Strength] Med  20 21:00 Ordered





 1,000 mg PO BEDTIME   


 


 Acetaminophen [Tylenol Extra Strength] Med  20 09:00 Ordered





 500 mg PO DAILY   


 


 Docusate Sodium [Colace] Med  20 14:13 Ordered





 100 mg PO BID PRN   


 


 Fluocinolone Acetonide [Fluocinolone Acetonide] Med  20 14:13 Ordered





 4 drop EARBOTH ASDIRECTED PRN   


 


 Isosorbide Mononitrate [Imdur] Med  20 16:00 Ordered





 30 mg PO DAILY@1600   


 


 Isosorbide Mononitrate [Imdur] Med  20 09:00 Ordered





 90 mg PO DAILY   


 


 Metoprolol Succinate [Toprol XL] Med  20 21:00 Ordered





 100 mg PO BEDTIME   


 


 Multivitamins [Tab-A-Daniel] Med  20 09:00 Ordered





 1 tab PO DAILY   


 


 Nitroglycerin [Nitrostat] Med  20 14:13 Ordered





 0.4 mg SL Q5M PRN   


 


 Sodium Chloride 0.9% [Saline Flush] Med  20 13:15 Active





 10 ml FLUSH ASDIRECTED PRN   


 


 Warfarin [Coumadin] Med  20 09:00 Ordered





 5 mg PO DAILY   


 


 bisacodyL [Dulcolax] Med  20 14:13 Ordered





 5 mg PO DAILY PRN   


 


 glipiZIDE [Glucotrol XL] Med  20 09:00 Ordered





 2.5 mg PO DAILY   


 


 lisinopriL [Prinivil] Med  20 09:00 Ordered





 10 mg PO DAILY   


 


 DVT/VTE Prophylaxis Reflex [OM.PC] Per Unit Routine Oth  20 14:07 Ordered


 


 Peripheral IV Insertion Adult [OM.PC] Routine Oth  20 13:15 Ordered


 


 Resuscitation Status Routine Resus Stat  20 14:07 Ordered


 


 EKG 12 Lead [EK] Routine Ther  20 13:15 Ordered








                                Medication Orders





Acetaminophen (Tylenol Extra Strength)  500 mg PO DAILY KWABENA


Acetaminophen (Tylenol Extra Strength)  1,000 mg PO BEDTIME KWABENA


Bisacodyl (Dulcolax)  5 mg PO DAILY PRN


   PRN Reason: Constipation


Docusate Sodium (Colace)  100 mg PO BID PRN


   PRN Reason: Constipation


Glipizide (Glucotrol Xl)  2.5 mg PO DAILY KWABENA


Isosorbide Mononitrate (Imdur)  30 mg PO DAILY@1600 KWABENA


Isosorbide Mononitrate (Imdur)  90 mg PO DAILY [facility]


Lisinopril (Prinivil)  10 mg PO DAILY [facility]


Metoprolol Succinate (Toprol Xl)  100 mg PO BEDTIME [facility]


Multivitamins/Minerals/Vitamin C (Tab-A-Daniel)  1 tab PO DAILY [facility]


Nitroglycerin (Nitrostat)  0.4 mg SL Q5M PRN


   PRN Reason: Chest Pain


Non-Formulary Medication (Fluocinolone Acetonide [Fluocinolone Acetonide])  4 

drop EARBOTH ASDIRECTED PRN


   PRN Reason: Dizziness


Sodium Chloride (Saline Flush)  10 ml FLUSH ASDIRECTED PRN


   PRN Reason: Keep Vein Open


   Last Admin: 20 12:50  Dose: 10 ml


   Documented by: CARYN


Warfarin Sodium (Coumadin)  5 mg PO DAILY [facility]








Labs: 


                                Laboratory Tests











  20 Range/Units





  12:50 12:50 12:50 


 


WBC  8.5    (4.5-12.0)  X10-3/uL


 


RBC  4.13    (3.23-5.20)  x10(6)uL


 


Hgb  12.8    (11.5-15.5)  g/dL


 


Hct  39.4    (30.0-51.3)  %


 


MCV  95.5    (80-96)  fL


 


MCH  31.1    (27.7-33.6)  pg


 


MCHC  32.6    (32.2-35.4)  g/dL


 


RDW  12.6    (11.5-15.5)  %


 


Plt Count  166    (125-369)  X10(3)uL


 


MPV  11.6 H    (7.4-10.4)  fL


 


Neut % (Auto)  75.7    (46-82)  %


 


Lymph % (Auto)  14.1    (13-37)  %


 


Mono % (Auto)  7.0    (4-12)  %


 


Eos % (Auto)  0 L    (1.0-5.0)  %


 


Baso % (Auto)  3 H    (0-2)  %


 


Neut # (Auto)  6.4    (1.6-8.3)  #


 


Lymph # (Auto)  1.2    (0.6-5.0)  #


 


Mono # (Auto)  0.6    (0.0-1.3)  #


 


Eos # (Auto)  0.0    (0.0-0.8)  #


 


Baso # (Auto)  0.3 H    (0.0-0.2)  #


 


PT     (9.0-11.1)  sec


 


INR     (1.00-1.24)  


 


APTT     (24.4-33.2)  SECONDS


 


Sodium   131 L   (135-145)  mmol/L


 


Potassium   5.0  D   (3.5-5.3)  mmol/L


 


Chloride   93 L D   (100-110)  mmol/L


 


Carbon Dioxide   30   (21-32)  mmol/L


 


BUN   28 H D   (7-18)  mg/dL


 


Creatinine   1.6 H   (0.55-1.02)  mg/dL


 


Est Cr Clr Drug Dosing   17.46   mL/min


 


Estimated GFR (MDRD)   30 L   (>60)  


 


BUN/Creatinine Ratio   17.5   (9-20)  


 


Glucose   319 H D   ()  mg/dL


 


Calcium   9.2   (8.6-10.2)  mg/dL


 


Total Bilirubin   1.0   (0.1-1.3)  mg/dL


 


AST   23   (5-25)  IU/L


 


ALT   28  D   (12-36)  U/L


 


Alkaline Phosphatase   51 L   ()  IU/L


 


Troponin I    19.2  (4.0-60.3)  pg/mL


 


NT-Pro-B Natriuret Pep    66443 H*  (<=450)  pg/mL


 


Total Protein   7.3   (6.0-8.0)  g/dL


 


Albumin   3.7   (3.2-4.6)  g/dL


 


Globulin   3.6   g/dL


 


Albumin/Globulin Ratio   1.0   














  09/26/20 Range/Units





  12:50 


 


WBC   (4.5-12.0)  X10-3/uL


 


RBC   (3.23-5.20)  x10(6)uL


 


Hgb   (11.5-15.5)  g/dL


 


Hct   (30.0-51.3)  %


 


MCV   (80-96)  fL


 


MCH   (27.7-33.6)  pg


 


MCHC   (32.2-35.4)  g/dL


 


RDW   (11.5-15.5)  %


 


Plt Count   (125-369)  X10(3)uL


 


MPV   (7.4-10.4)  fL


 


Neut % (Auto)   (46-82)  %


 


Lymph % (Auto)   (13-37)  %


 


Mono % (Auto)   (4-12)  %


 


Eos % (Auto)   (1.0-5.0)  %


 


Baso % (Auto)   (0-2)  %


 


Neut # (Auto)   (1.6-8.3)  #


 


Lymph # (Auto)   (0.6-5.0)  #


 


Mono # (Auto)   (0.0-1.3)  #


 


Eos # (Auto)   (0.0-0.8)  #


 


Baso # (Auto)   (0.0-0.2)  #


 


PT  21.8 H  (9.0-11.1)  sec


 


INR  2.12 H  (1.00-1.24)  


 


APTT  32.8  (24.4-33.2)  SECONDS


 


Sodium   (135-145)  mmol/L


 


Potassium   (3.5-5.3)  mmol/L


 


Chloride   (100-110)  mmol/L


 


Carbon Dioxide   (21-32)  mmol/L


 


BUN   (7-18)  mg/dL


 


Creatinine   (0.55-1.02)  mg/dL


 


Est Cr Clr Drug Dosing   mL/min


 


Estimated GFR (MDRD)   (>60)  


 


BUN/Creatinine Ratio   (9-20)  


 


Glucose   ()  mg/dL


 


Calcium   (8.6-10.2)  mg/dL


 


Total Bilirubin   (0.1-1.3)  mg/dL


 


AST   (5-25)  IU/L


 


ALT   (12-36)  U/L


 


Alkaline Phosphatase   ()  IU/L


 


Troponin I   (4.0-60.3)  pg/mL


 


NT-Pro-B Natriuret Pep   (<=450)  pg/mL


 


Total Protein   (6.0-8.0)  g/dL


 


Albumin   (3.2-4.6)  g/dL


 


Globulin   g/dL


 


Albumin/Globulin Ratio   











Meds: 


Medications











Generic Name Dose Route Start Last Admin





  Trade Name Freq  PRN Reason Stop Dose Admin


 


Acetaminophen  500 mg  20 09:00 





  Tylenol Extra Strength  PO  





  DAILY KWABENA  


 


Acetaminophen  1,000 mg  20 21:00 





  Tylenol Extra Strength  PO  





  BEDTIME KWABENA  


 


Bisacodyl  5 mg  20 14:13 





  Dulcolax  PO  





  DAILY PRN  





  Constipation  


 


Docusate Sodium  100 mg  20 14:13 





  Colace  PO  





  BID PRN  





  Constipation  


 


Glipizide  2.5 mg  20 09:00 





  Glucotrol Xl  PO  





  DAILY [facility]  


 


Isosorbide Mononitrate  30 mg  20 16:00 





  Imdur  PO  





  DAILY@1600 KWABENA  


 


Isosorbide Mononitrate  90 mg  20 09:00 





  Imdur  PO  





  DAILY KWABENA  


 


Lisinopril  10 mg  20 09:00 





  Prinivil  PO  





  DAILY [facility]  


 


Metoprolol Succinate  100 mg  20 21:00 





  Toprol Xl  PO  





  BEDTIME KWABENA  


 


Multivitamins/Minerals/Vitamin C  1 tab  20 09:00 





  Tab-A-Daniel  PO  





  DAILY [facility]  


 


Nitroglycerin  0.4 mg  20 14:13 





  Nitrostat  SL  





  Q5M PRN  





  Chest Pain  


 


Non-Formulary Medication  4 drop  20 14:13 





  Fluocinolone Acetonide [Fluocinolone Acetonide]  EARBOTH  





  ASDIRECTED PRN  





  Dizziness  


 


Sodium Chloride  10 ml  20 13:15  20 12:50





  Saline Flush  FLUSH   10 ml





  ASDIRECTED PRN   Administration





  Keep Vein Open  


 


Warfarin Sodium  5 mg  20 09:00 





  Coumadin  PO  





  DAILY KWABENA  














Discontinued Medications














Generic Name Dose Route Start Last Admin





  Trade Name Freq  PRN Reason Stop Dose Admin


 


Furosemide  80 mg  20 14:09  20 14:18





  Lasix  IVPUSH  20 14:10  80 mg





  NOW ONE   Administration














Departure





- Departure


Time of Disposition: 14:26


Disposition: Admitted As Inpatient 66


Clinical Impression: 


 CHF exacerbation








- Discharge Information


Referrals: 


Goldy Whitney MD [Primary Care Provider] - 


Forms:  ED Department Discharge





Sepsis Event Note (ED)





- Evaluation


Sepsis Screening Result: No Definite Risk





- Focused Exam


Vital Signs: 


                                   Vital Signs











  Temp Pulse Resp BP Pulse Ox


 


 20 13:00   75  20  133/79  100


 


 20 12:31   108 H  22 H  130/96 H  100


 


 20 12:13  36.7 C  79  22 H  132/108 H  100














- My Orders


Last 24 Hours: 


My Active Orders





20 13:03


Abdomen 2V AP Flat Upright [CR] Stat 


Chest 1V Frontal [CR] Stat 





20 13:15


Sodium Chloride 0.9% [Saline Flush]   10 ml FLUSH ASDIRECTED PRN 


Peripheral IV Insertion Adult [OM.PC] Routine 


EKG 12 Lead [EK] Routine 





20 13:16


EKG Documentation Completion [RC] ASDIRECTED 





20 14:07


Patient Status [ADT] Routine 


Pulse Oximetry [RC] PRN 


Up With Assistance [RC] ASDIRECTED 


Vital Signs [RC] Q4H 


DVT/VTE Prophylaxis Reflex [OM.PC] Per Unit Routine 


Resuscitation Status Routine 





20 14:08


Antiembolic Devices [RC] .Routine 


VTE/DVT Education [RC] Click to Edit 





20 14:13


Docusate Sodium [Colace]   100 mg PO BID PRN 


Nitroglycerin [Nitrostat]   0.4 mg SL Q5M PRN 


bisacodyL [Dulcolax]   5 mg PO DAILY PRN 





20 14:13


Fluocinolone Acetonide [Fluocinolone Acetonide]   4 drop EARBOTH ASDIRECTED PRN 





20 14:15


INR,PT,PROTHROMBIN TIME [COAG] Routine 





20 16:00


Isosorbide Mononitrate [Imdur]   30 mg PO DAILY@1600 





20 21:00


Acetaminophen [Tylenol Extra Strength]   1,000 mg PO BEDTIME 


Metoprolol Succinate [Toprol XL]   100 mg PO BEDTIME 





20 09:00


Acetaminophen [Tylenol Extra Strength]   500 mg PO DAILY 


Isosorbide Mononitrate [Imdur]   90 mg PO DAILY 


Multivitamins [Tab-A-Daniel]   1 tab PO DAILY 


Warfarin [Coumadin]   5 mg PO DAILY 


glipiZIDE [Glucotrol XL]   2.5 mg PO DAILY 


lisinopriL [Prinivil]   10 mg PO DAILY 














- Assessment/Plan


Last 24 Hours: 


My Active Orders





20 13:03


Abdomen 2V AP Flat Upright [CR] Stat 


Chest 1V Frontal [CR] Stat 





20 13:15


Sodium Chloride 0.9% [Saline Flush]   10 ml FLUSH ASDIRECTED PRN 


Peripheral IV Insertion Adult [OM.PC] Routine 


EKG 12 Lead [EK] Routine 





20 13:16


EKG Documentation Completion [RC] ASDIRECTED 





20 14:07


Patient Status [ADT] Routine 


Pulse Oximetry [RC] PRN 


Up With Assistance [RC] ASDIRECTED 


Vital Signs [RC] Q4H 


DVT/VTE Prophylaxis Reflex [OM.PC] Per Unit Routine 


Resuscitation Status Routine 





20 14:08


Antiembolic Devices [RC] .Routine 


VTE/DVT Education [RC] Click to Edit 





20 14:13


Docusate Sodium [Colace]   100 mg PO BID PRN 


Nitroglycerin [Nitrostat]   0.4 mg SL Q5M PRN 


bisacodyL [Dulcolax]   5 mg PO DAILY PRN 





20 14:13


Fluocinolone Acetonide [Fluocinolone Acetonide]   4 drop EARBOTH ASDIRECTED PRN 





20 14:15


INR,PT,PROTHROMBIN TIME [COAG] Routine 





20 16:00


Isosorbide Mononitrate [Imdur]   30 mg PO DAILY@1600 





20 21:00


Acetaminophen [Tylenol Extra Strength]   1,000 mg PO BEDTIME 


Metoprolol Succinate [Toprol XL]   100 mg PO BEDTIME 





20 09:00


Acetaminophen [Tylenol Extra Strength]   500 mg PO DAILY 


Isosorbide Mononitrate [Imdur]   90 mg PO DAILY 


Multivitamins [Tab-A-Daniel]   1 tab PO DAILY 


Warfarin [Coumadin]   5 mg PO DAILY 


glipiZIDE [Glucotrol XL]   2.5 mg PO DAILY 


lisinopriL [Prinivil]   10 mg PO DAILY

## 2020-09-26 NOTE — PCM.HP.2
H&P History of Present Illness





- General


Date of Service: 20


Admit Problem/Dx: 


                           Admission Diagnosis/Problem





Admission Diagnosis/Problem      CHF, Congestive heart failure








Source of Information: Patient, Old Records, Other (Emergency room note)


History Limitations: Reports: No Limitations





- History of Present Illness


Initial Comments - Free Text/Narative: 


This is an 88-year-old female patient lives with herself with known history of 

CHF, angina, COPD, atrial fibrillation has 5 days of feeling weak and short of 

breath. She's been sitting up in her bed in a chair. She uses 2 pillows to 

sleep. She's gained about 9 pounds she states her home scale. She denies leg 

swelling. She has chronic rhinitis but no coughing, wheezing, fevers, chills. 

She's not been able to move her bowels for 5 days and had some abdominal pain 

and distention. She denies dysuria, pyuria, hematuria. She has had some chest 

pain which is chronic condition for. She says isosorbide is help that. It's 

better at this point when I'm talking to her.





  ** L midsternal chest


Pain Score (Numeric/FACES): 4





  ** ACROSS ABDOMEN


Pain Score (Numeric/FACES): 6





- Related Data


Allergies/Adverse Reactions: 


                                    Allergies











Allergy/AdvReac Type Severity Reaction Status Date / Time


 


Penicillins Allergy  Rash Verified 20 12:39


 


Sulfa (Sulfonamide Allergy  Rash Verified 20 12:39





Antibiotics)     











Home Medications: 


                                    Home Meds





Docusate Sodium 100 mg PO BID PRN 19 [History]


Fluocinolone Acetonide 4 drop EARBOTH ASDIRECTED PRN 19 [History]


Isosorbide Mononitrate [Imdur] 90 mg PO DAILY 19 [History]


Metoprolol Succinate [Toprol XL 100mg] 100 mg PO BEDTIME 19 [History]


Nitroglycerin 0.4 mg SL Q5M PRN 19 [History]


Peppermint Oil Cap 1 cap PO BEDTIME 19 [History]


Triamcinolone Acetonide [Triamcinolone Acetonide 0.1% Crm] 1 applic TOP BID PRN 

19 [History]


Ubidecarenone [Coenzyme Q10] 100 mg PO QPM 19 [History]


Warfarin [Coumadin] 5 mg PO SUTUWETHSA 19 [History]


lisinopriL [Zestril] 10 mg PO DAILY 19 [History]


nitrofurantoin macrocrystaL [Macrodantin] 50 mg PO MOWEFR@2100 19 

[History]


Acetaminophen [Tylenol Extra Strength] 1,000 mg PO BEDTIME 20 [History]


Cholecalciferol (Vitamin D3) [Vitamin D3] 10 mcg PO DAILY 20 [History]


Acetaminophen [Tylenol Extra Strength] 500 mg PO DAILY  tablet 20 [Rx]


Ascorbic Acid [Vitamin C] 1,000 mg PO BEDTIME  tablet 20 [Rx]


Isosorbide Mononitrate [Imdur] 30 mg PO DAILY@1600  tab.er 20 [Rx]


Multivitamins [Tab-A-Daniel] 1 tab PO DAILY  tablet 20 [Rx]


bisacodyL [Dulcolax] 5 mg PO DAILY PRN  tablet 20 [Rx]


glipiZIDE [Glucotrol XL] 2.5 mg PO DAILY #30 tab.er.24 20 [Rx]


Furosemide [Lasix] 40 mg PO BID 20 [History]


Warfarin [Coumadin] 2.5 mg PO MOFR 20 [History]











Past Medical History


HEENT History: Reports: Glaucoma, Macular Degeneration


Cardiovascular History: Reports: Afib, Heart Failure, Hypertension, MI, Stents


Respiratory History: Reports: Pneumonia, Recurrent


Other Respiratory History: Is currently on O2 @ 3L/NC @ home.


Gastrointestinal History: Reports: Diverticulosis


Genitourinary History: Reports: None


OB/GYN History: Reports: Pregnancy


Other OB/BYN History: 


Musculoskeletal History: Reports: Arthritis


Neurological History: Reports: Concussion, CVA, Migraines, Parkinson's, Vertigo


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


Psychiatric History: Reports: Anxiety, Depression


Endocrine/Metabolic History: Reports: Diabetes, Type II, Obesity/BMI 30+


Hematologic History: Reports: Anticoagulation Therapy





- Infectious Disease History


Infectious Disease History: Reports: Shingles





- Past Surgical History


HEENT Surgical History: Reports: Cataract Surgery


Other HEENT Surgeries/Procedures: bilat cataract


Cardiovascular Surgical History: Reports: Coronary Artery Stent


GI Surgical History: Reports: Colonoscopy, Other (See Below)


Other GI Surgeries/Procedures: exp lap


Female  Surgical History: Reports: Hysterectomy





Social & Family History





- Family History


Family Medical History: Noncontributory





- Tobacco Use


Smoking Status *Q: Never Smoker





- Caffeine Use


Caffeine Use: Reports: Coffee, Soda





- Recreational Drug Use


Recreational Drug Use: No





H&P Review of Systems





- Review of Systems:


Review Of Systems: See Below


General: Reports: Weakness


HEENT: Reports: No Symptoms


Pulmonary: Reports: Shortness of Breath.  Denies: Wheezing, Pleuritic Chest 

Pain, Cough, Sputum, Hemoptysis


Cardiovascular: Reports: Chest Pain.  Denies: Edema


Gastrointestinal: Reports: Constipation.  Denies: Abdominal Pain, Black Stool, 

Bloody Stool, Diarrhea, Hematochezia, Melena


Genitourinary: Reports: No Symptoms


Musculoskeletal: Reports: No Symptoms


Skin: Reports: No Symptoms


Neurological: Reports: Tremors


Hematologic/Lymphatic: Reports: No Symptoms


Immunologic: Reports: No Symptoms





Exam





- Exam


Exam: See Below





- Vital Signs


Vital Signs: 


                                Last Vital Signs











Temp  96.9 F   20 14:40


 


Pulse  95   20 14:40


 


Resp  26 H  20 14:40


 


BP  105/45 L  20 14:40


 


Pulse Ox  98   20 14:40











Weight: 170 lb





- Exam


General: Alert, Oriented, Cooperative


HEENT: Hearing Intact, Mucosa Moist & Pink, Posterior Pharynx Clear, TMs Clear


Neck: Supple, Trachea Midline


Lungs: Normal Respiratory Effort, Crackles (Bilateral bases).  No: Decreased 

Breath Sounds


Cardiovascular: Other (Due to her tremor I'm not able to hear heart today.)


GI/Abdominal Exam: Normal Bowel Sounds, Soft, Non-Tender, No Mass, Distended 

(Mild)


Back Exam: Normal Inspection, Full Range of Motion


Extremities: Normal Inspection, Non-Tender, No Pedal Edema


Skin: Warm, Dry, Intact


Neurological: Normal Speech, Other (Tremors)


Neuro Extensive - Mental Status: Alert, Oriented x3, Normal Mood/Affect, Normal 

Cognition, Memory Intact


Psychiatric: Alert, Normal Affect, Normal Mood





- Patient Data


Lab Results Last 24 hrs: 


                         Laboratory Results - last 24 hr











  09/26/20 09/26/20 09/26/20 Range/Units





  12:50 12:50 12:50 


 


WBC  8.5    (4.5-12.0)  X10-3/uL


 


RBC  4.13    (3.23-5.20)  x10(6)uL


 


Hgb  12.8    (11.5-15.5)  g/dL


 


Hct  39.4    (30.0-51.3)  %


 


MCV  95.5    (80-96)  fL


 


MCH  31.1    (27.7-33.6)  pg


 


MCHC  32.6    (32.2-35.4)  g/dL


 


RDW  12.6    (11.5-15.5)  %


 


Plt Count  166    (125-369)  X10(3)uL


 


MPV  11.6 H    (7.4-10.4)  fL


 


Neut % (Auto)  75.7    (46-82)  %


 


Lymph % (Auto)  14.1    (13-37)  %


 


Mono % (Auto)  7.0    (4-12)  %


 


Eos % (Auto)  0 L    (1.0-5.0)  %


 


Baso % (Auto)  3 H    (0-2)  %


 


Neut # (Auto)  6.4    (1.6-8.3)  #


 


Lymph # (Auto)  1.2    (0.6-5.0)  #


 


Mono # (Auto)  0.6    (0.0-1.3)  #


 


Eos # (Auto)  0.0    (0.0-0.8)  #


 


Baso # (Auto)  0.3 H    (0.0-0.2)  #


 


PT     (9.0-11.1)  sec


 


INR     (1.00-1.24)  


 


APTT     (24.4-33.2)  SECONDS


 


Sodium   131 L   (135-145)  mmol/L


 


Potassium   5.0  D   (3.5-5.3)  mmol/L


 


Chloride   93 L D   (100-110)  mmol/L


 


Carbon Dioxide   30   (21-32)  mmol/L


 


BUN   28 H D   (7-18)  mg/dL


 


Creatinine   1.6 H   (0.55-1.02)  mg/dL


 


Est Cr Clr Drug Dosing   17.46   mL/min


 


Estimated GFR (MDRD)   30 L   (>60)  


 


BUN/Creatinine Ratio   17.5   (9-20)  


 


Glucose   319 H D   ()  mg/dL


 


Calcium   9.2   (8.6-10.2)  mg/dL


 


Total Bilirubin   1.0   (0.1-1.3)  mg/dL


 


AST   23   (5-25)  IU/L


 


ALT   28  D   (12-36)  U/L


 


Alkaline Phosphatase   51 L   ()  IU/L


 


Troponin I    19.2  (4.0-60.3)  pg/mL


 


NT-Pro-B Natriuret Pep    59403 H*  (<=450)  pg/mL


 


Total Protein   7.3   (6.0-8.0)  g/dL


 


Albumin   3.7   (3.2-4.6)  g/dL


 


Globulin   3.6   g/dL


 


Albumin/Globulin Ratio   1.0   














  09/26/20 Range/Units





  12:50 


 


WBC   (4.5-12.0)  X10-3/uL


 


RBC   (3.23-5.20)  x10(6)uL


 


Hgb   (11.5-15.5)  g/dL


 


Hct   (30.0-51.3)  %


 


MCV   (80-96)  fL


 


MCH   (27.7-33.6)  pg


 


MCHC   (32.2-35.4)  g/dL


 


RDW   (11.5-15.5)  %


 


Plt Count   (125-369)  X10(3)uL


 


MPV   (7.4-10.4)  fL


 


Neut % (Auto)   (46-82)  %


 


Lymph % (Auto)   (13-37)  %


 


Mono % (Auto)   (4-12)  %


 


Eos % (Auto)   (1.0-5.0)  %


 


Baso % (Auto)   (0-2)  %


 


Neut # (Auto)   (1.6-8.3)  #


 


Lymph # (Auto)   (0.6-5.0)  #


 


Mono # (Auto)   (0.0-1.3)  #


 


Eos # (Auto)   (0.0-0.8)  #


 


Baso # (Auto)   (0.0-0.2)  #


 


PT  21.8 H  (9.0-11.1)  sec


 


INR  2.12 H  (1.00-1.24)  


 


APTT  32.8  (24.4-33.2)  SECONDS


 


Sodium   (135-145)  mmol/L


 


Potassium   (3.5-5.3)  mmol/L


 


Chloride   (100-110)  mmol/L


 


Carbon Dioxide   (21-32)  mmol/L


 


BUN   (7-18)  mg/dL


 


Creatinine   (0.55-1.02)  mg/dL


 


Est Cr Clr Drug Dosing   mL/min


 


Estimated GFR (MDRD)   (>60)  


 


BUN/Creatinine Ratio   (9-20)  


 


Glucose   ()  mg/dL


 


Calcium   (8.6-10.2)  mg/dL


 


Total Bilirubin   (0.1-1.3)  mg/dL


 


AST   (5-25)  IU/L


 


ALT   (12-36)  U/L


 


Alkaline Phosphatase   ()  IU/L


 


Troponin I   (4.0-60.3)  pg/mL


 


NT-Pro-B Natriuret Pep   (<=450)  pg/mL


 


Total Protein   (6.0-8.0)  g/dL


 


Albumin   (3.2-4.6)  g/dL


 


Globulin   g/dL


 


Albumin/Globulin Ratio   











Result Diagrams: 


                                 20 12:50





                                 20 12:50





Sepsis Event Note





- Evaluation


Sepsis Screening Result: No Definite Risk





- Focused Exam


Vital Signs: 


                                   Vital Signs











  Temp Pulse Resp BP Pulse Ox Pulse Ox


 


 20 14:40  96.9 F  95  26 H  105/45 L  98 


 


 20 13:00   75  20  133/79  100 


 


 20 12:31   108 H  22 H  130/96 H  100 


 


 20 12:13  98.0 F  79  22 H  132/108 H  100  100














- Problem List


(1) Palliative care status


SNOMED Code(s): 411517249


   ICD Code: Z51.5 - ENCOUNTER FOR PALLIATIVE CARE   Status: Acute   Current 

Visit: Yes   





(2) CHF exacerbation


SNOMED Code(s): 613977326, 99649946113478


   ICD Code: I50.9 - HEART FAILURE, UNSPECIFIED   Status: Acute   Current Visit:

 Yes   





(3) Chest wall muscle strain


SNOMED Code(s): 400528890


   ICD Code: S29.011A - STRAIN OF MUSCLE AND TENDON OF FRONT WALL OF THORAX, 

INIT   Status: Acute   Current Visit: No   





(4) Elevated brain natriuretic peptide (BNP) level


SNOMED Code(s): 561105414, 102457071


   ICD Code: R79.89 - OTHER SPECIFIED ABNORMAL FINDINGS OF BLOOD CHEMISTRY   

Status: Acute   Current Visit: No   





(5) Ischemic cardiomyopathy


SNOMED Code(s): 966385311


   ICD Code: I25.5 - ISCHEMIC CARDIOMYOPATHY   Status: Acute   Current Visit: No

   





(6) Parkinson disease


SNOMED Code(s): 95188247


   ICD Code: G20 - PARKINSON'S DISEASE   Status: Acute   Current Visit: No   





(7) Weakness


SNOMED Code(s): 87614187


   ICD Code: R53.1 - WEAKNESS   Status: Acute   Current Visit: No   





(8) Afib


SNOMED Code(s): 85665581


   ICD Code: I48.91 - UNSPECIFIED ATRIAL FIBRILLATION   Status: Chronic   

Current Visit: No   


Qualifiers: 


   Atrial fibrillation type: longstanding persistent   Qualified Code(s): I48.11

 - Longstanding persistent atrial fibrillation   





(9) Diabetes mellitus type 2, diet-controlled


SNOMED Code(s): 625982596664046, 090896283995757


   ICD Code: E11.9 - TYPE 2 DIABETES MELLITUS WITHOUT COMPLICATIONS   Status: 

Chronic   Current Visit: No   


Problem List Initiated/Reviewed/Updated: Yes


Orders Last 24hrs: 


                               Active Orders 24 hr











 Category Date Time Status


 


 Patient Status [ADT] Routine ADT  20 14:07 Active


 


 Antiembolic Devices [RC] .Routine Care  20 14:08 Active


 


 EKG Documentation Completion [RC] ASDIRECTED Care  20 13:16 Active


 


 Intake and Output Strict [RC] ASDIRECTED Care  20 15:49 Active


 


 Pulse Oximetry [RC] PRN Care  20 14:07 Active


 


 Telemetry Monitoring [Cardiac Monitoring] [RC] .As Care  20 14:27 Active





 Directed   


 


 Up With Assistance [RC] ASDIRECTED Care  20 14:07 Active


 


 VTE/DVT Education [RC] Click to Edit Care  20 14:08 Active


 


 Vital Signs [RC] Q4H Care  20 14:07 Active


 


 Consistent Carbohydrate Diet [DIET] Diet  20 Dinner Active


 


 Abdomen 2V AP Flat Upright [CR] Stat Exams  20 13:03 Taken


 


 Chest 1V Frontal [CR] Stat Exams  20 13:03 Taken


 


 INR,PT,PROTHROMBIN TIME [COAG] DAILY Lab  20 06:00 Ordered


 


 INR,PT,PROTHROMBIN TIME [COAG] DAILY Lab  20 06:00 Ordered


 


 INR,PT,PROTHROMBIN TIME [COAG] DAILY Lab  20 06:00 Ordered


 


 INR,PT,PROTHROMBIN TIME [COAG] DAILY Lab  20 06:00 Ordered


 


 INR,PT,PROTHROMBIN TIME [COAG] DAILY Lab  10/01/20 06:00 Ordered


 


 INR,PT,PROTHROMBIN TIME [COAG] Routine Lab  20 14:15 Ordered


 


 Acetaminophen [Tylenol Extra Strength] Med  20 21:00 Active





 1,000 mg PO BEDTIME   


 


 Acetaminophen [Tylenol Extra Strength] Med  20 09:00 Active





 500 mg PO DAILY   


 


 Acetaminophen/HYDROcodone [Norco 325-5 MG] Med  20 15:48 Active





 1 tab PO Q6H PRN   


 


 Docusate Sodium [Colace] Med  20 14:13 Active





 100 mg PO BID PRN   


 


 Fluocinolone Acetonide [Fluocinolone Acetonide] Med  20 14:13 Active





 4 drop EARBOTH ASDIRECTED PRN   


 


 Isosorbide Mononitrate [Imdur] Med  20 16:00 Active





 30 mg PO DAILY@1600   


 


 Isosorbide Mononitrate [Imdur] Med  20 09:00 Active





 90 mg PO DAILY   


 


 Metoprolol Succinate [Toprol XL] Med  20 21:00 Active





 100 mg PO BEDTIME   


 


 Multivitamins [Tab-A-Daniel] Med  20 09:00 Active





 1 tab PO DAILY   


 


 Nitroglycerin [Nitrostat] Med  20 14:13 Active





 0.4 mg SL Q5M PRN   


 


 Ondansetron [Zofran ODT] Med  20 15:48 Active





 4 mg PO Q6H PRN   


 


 Sodium Chloride 0.9% [Saline Flush] Med  20 13:15 Active





 10 ml FLUSH ASDIRECTED PRN   


 


 Warfarin [Coumadin] Med  20 09:00 Active





 5 mg PO DAILY   


 


 bisacodyL [Dulcolax] Med  20 14:13 Active





 5 mg PO DAILY PRN   


 


 glipiZIDE [Glucotrol XL] Med  20 09:00 Active





 2.5 mg PO DAILY   


 


 lisinopriL [Prinivil] Med  20 09:00 Active





 10 mg PO DAILY   


 


 nitrofurantoin macrocrystaL [Macrodantin] Med  20 21:00 Active





 50 mg PO MOWEFR@2100   


 


 DVT/VTE Prophylaxis Reflex [OM.PC] Per Unit Routine Oth  20 14:07 Ordered


 


 Peripheral IV Insertion Adult [OM.PC] Routine Oth  20 13:15 Ordered


 


 Resuscitation Status Routine Resus Stat  20 14:07 Ordered


 


 EKG 12 Lead [EK] Routine Ther  20 13:15 Ordered








                                Medication Orders





Acetaminophen (Tylenol Extra Strength)  500 mg PO DAILY KWABENA


Acetaminophen (Tylenol Extra Strength)  1,000 mg PO BEDTIME KWABENA


Hydrocodone Bitart/Acetaminophen (Norco 325-5 Mg)  1 tab PO Q6H PRN


   PRN Reason: Pain


Bisacodyl (Dulcolax)  5 mg PO DAILY PRN


   PRN Reason: Constipation


Docusate Sodium (Colace)  100 mg PO BID PRN


   PRN Reason: Constipation


Glipizide (Glucotrol Xl)  2.5 mg PO DAILY [facility]


Isosorbide Mononitrate (Imdur)  30 mg PO DAILY@1600 [facility]


Isosorbide Mononitrate (Imdur)  90 mg PO DAILY [facility]


Lisinopril (Prinivil)  10 mg PO DAILY [facility]


Metoprolol Succinate (Toprol Xl)  100 mg PO BEDTIME [facility]


Multivitamins/Minerals/Vitamin C (Tab-A-Daniel)  1 tab PO DAILY [facility]


Nitrofurantoin Macrocrystals (Macrodantin)  50 mg PO MOWEFR@2100 [facility]


Nitroglycerin (Nitrostat)  0.4 mg SL Q5M PRN


   PRN Reason: Chest Pain


Non-Formulary Medication (Fluocinolone Acetonide [Fluocinolone Acetonide])  4 

drop EARBOTH ASDIRECTED PRN


   PRN Reason: Dizziness


Ondansetron HCl (Zofran Odt)  4 mg PO Q6H PRN


   PRN Reason: Nausea/Vomiting


Sodium Chloride (Saline Flush)  10 ml FLUSH ASDIRECTED PRN


   PRN Reason: Keep Vein Open


   Last Admin: 20 12:50  Dose: 10 ml


   Documented by: CARYN


Warfarin Sodium (Coumadin)  5 mg PO DAILY [facility]








Assessment/Plan Comment:: 


1. Admit to inpatient


2. DNR/DNI


3. Patient is on Coumadin continue to follow INR daily. This should do for her 

clot prophylaxis.


4. Diabetic diet


5. Up with assist


6. Lasix 80 mg IV today and hold her by mouth Lasix.


7. Reviewed her medications and restart most of them.


8. I will have the chest x-ray and abdominal x-ray read.


9. Patient had large BM before she came over the floor which should help her 

abdominal pain because of his no pain when I palpated.


10. PT/OT/

## 2020-09-27 RX ADMIN — HYDROCODONE BITARTRATE AND ACETAMINOPHEN PRN TAB: 5; 325 TABLET ORAL at 08:18

## 2020-09-27 RX ADMIN — METOPROLOL SUCCINATE SCH MG: 100 TABLET, FILM COATED, EXTENDED RELEASE ORAL at 20:57

## 2020-09-27 RX ADMIN — HYDROCODONE BITARTRATE AND ACETAMINOPHEN PRN TAB: 5; 325 TABLET ORAL at 15:28

## 2020-09-27 NOTE — PCM.PN
- General Info


Date of Service: 09/27/20


Admission Dx/Problem (Free Text): 


Patient states she feels better. She states she's not sweating when she gets up 

in her breathing shortness of breath is improved. She still has chest pain but 

that's chronic.








- Patient Data


Vitals - Most Recent: 


                                Last Vital Signs











Temp  97.7 F   09/27/20 04:00


 


Pulse  67   09/27/20 04:00


 


Resp  20   09/27/20 04:00


 


BP  133/67   09/27/20 04:00


 


Pulse Ox  98   09/27/20 04:00











Weight - Most Recent: 180 lb 8 oz


I&O - Last 24 Hours: 


                                 Intake & Output











 09/26/20 09/27/20 09/27/20





 22:59 06:59 14:59


 


Intake Total 840 400 


 


Output Total 500 400 


 


Balance 340 0 











Lab Results Last 24 Hours: 


                         Laboratory Results - last 24 hr











  09/26/20 09/26/20 09/26/20 Range/Units





  12:50 12:50 12:50 


 


WBC  8.5    (4.5-12.0)  X10-3/uL


 


RBC  4.13    (3.23-5.20)  x10(6)uL


 


Hgb  12.8    (11.5-15.5)  g/dL


 


Hct  39.4    (30.0-51.3)  %


 


MCV  95.5    (80-96)  fL


 


MCH  31.1    (27.7-33.6)  pg


 


MCHC  32.6    (32.2-35.4)  g/dL


 


RDW  12.6    (11.5-15.5)  %


 


Plt Count  166    (125-369)  X10(3)uL


 


MPV  11.6 H    (7.4-10.4)  fL


 


Neut % (Auto)  75.7    (46-82)  %


 


Lymph % (Auto)  14.1    (13-37)  %


 


Mono % (Auto)  7.0    (4-12)  %


 


Eos % (Auto)  0 L    (1.0-5.0)  %


 


Baso % (Auto)  3 H    (0-2)  %


 


Neut # (Auto)  6.4    (1.6-8.3)  #


 


Lymph # (Auto)  1.2    (0.6-5.0)  #


 


Mono # (Auto)  0.6    (0.0-1.3)  #


 


Eos # (Auto)  0.0    (0.0-0.8)  #


 


Baso # (Auto)  0.3 H    (0.0-0.2)  #


 


PT     (9.0-11.1)  sec


 


INR     (1.00-1.24)  


 


APTT     (24.4-33.2)  SECONDS


 


Sodium   131 L   (135-145)  mmol/L


 


Potassium   5.0  D   (3.5-5.3)  mmol/L


 


Chloride   93 L D   (100-110)  mmol/L


 


Carbon Dioxide   30   (21-32)  mmol/L


 


BUN   28 H D   (7-18)  mg/dL


 


Creatinine   1.6 H   (0.55-1.02)  mg/dL


 


Est Cr Clr Drug Dosing   17.46   mL/min


 


Estimated GFR (MDRD)   30 L   (>60)  


 


BUN/Creatinine Ratio   17.5   (9-20)  


 


Glucose   319 H D   ()  mg/dL


 


POC Glucose     ()  mg/dL


 


Calcium   9.2   (8.6-10.2)  mg/dL


 


Total Bilirubin   1.0   (0.1-1.3)  mg/dL


 


AST   23   (5-25)  IU/L


 


ALT   28  D   (12-36)  U/L


 


Alkaline Phosphatase   51 L   ()  IU/L


 


Troponin I    19.2  (4.0-60.3)  pg/mL


 


NT-Pro-B Natriuret Pep    24744 H*  (<=450)  pg/mL


 


Total Protein   7.3   (6.0-8.0)  g/dL


 


Albumin   3.7   (3.2-4.6)  g/dL


 


Globulin   3.6   g/dL


 


Albumin/Globulin Ratio   1.0   














  09/26/20 09/26/20 09/27/20 Range/Units





  12:50 17:26 06:45 


 


WBC     (4.5-12.0)  X10-3/uL


 


RBC     (3.23-5.20)  x10(6)uL


 


Hgb     (11.5-15.5)  g/dL


 


Hct     (30.0-51.3)  %


 


MCV     (80-96)  fL


 


MCH     (27.7-33.6)  pg


 


MCHC     (32.2-35.4)  g/dL


 


RDW     (11.5-15.5)  %


 


Plt Count     (125-369)  X10(3)uL


 


MPV     (7.4-10.4)  fL


 


Neut % (Auto)     (46-82)  %


 


Lymph % (Auto)     (13-37)  %


 


Mono % (Auto)     (4-12)  %


 


Eos % (Auto)     (1.0-5.0)  %


 


Baso % (Auto)     (0-2)  %


 


Neut # (Auto)     (1.6-8.3)  #


 


Lymph # (Auto)     (0.6-5.0)  #


 


Mono # (Auto)     (0.0-1.3)  #


 


Eos # (Auto)     (0.0-0.8)  #


 


Baso # (Auto)     (0.0-0.2)  #


 


PT  21.8 H   17.8 H  (9.0-11.1)  sec


 


INR  2.12 H   1.71 H  (1.00-1.24)  


 


APTT  32.8    (24.4-33.2)  SECONDS


 


Sodium     (135-145)  mmol/L


 


Potassium     (3.5-5.3)  mmol/L


 


Chloride     (100-110)  mmol/L


 


Carbon Dioxide     (21-32)  mmol/L


 


BUN     (7-18)  mg/dL


 


Creatinine     (0.55-1.02)  mg/dL


 


Est Cr Clr Drug Dosing     mL/min


 


Estimated GFR (MDRD)     (>60)  


 


BUN/Creatinine Ratio     (9-20)  


 


Glucose     ()  mg/dL


 


POC Glucose   321 H   ()  mg/dL


 


Calcium     (8.6-10.2)  mg/dL


 


Total Bilirubin     (0.1-1.3)  mg/dL


 


AST     (5-25)  IU/L


 


ALT     (12-36)  U/L


 


Alkaline Phosphatase     ()  IU/L


 


Troponin I     (4.0-60.3)  pg/mL


 


NT-Pro-B Natriuret Pep     (<=450)  pg/mL


 


Total Protein     (6.0-8.0)  g/dL


 


Albumin     (3.2-4.6)  g/dL


 


Globulin     g/dL


 


Albumin/Globulin Ratio     














  09/27/20 09/27/20 Range/Units





  06:45 06:45 


 


WBC    (4.5-12.0)  X10-3/uL


 


RBC    (3.23-5.20)  x10(6)uL


 


Hgb    (11.5-15.5)  g/dL


 


Hct    (30.0-51.3)  %


 


MCV    (80-96)  fL


 


MCH    (27.7-33.6)  pg


 


MCHC    (32.2-35.4)  g/dL


 


RDW    (11.5-15.5)  %


 


Plt Count    (125-369)  X10(3)uL


 


MPV    (7.4-10.4)  fL


 


Neut % (Auto)    (46-82)  %


 


Lymph % (Auto)    (13-37)  %


 


Mono % (Auto)    (4-12)  %


 


Eos % (Auto)    (1.0-5.0)  %


 


Baso % (Auto)    (0-2)  %


 


Neut # (Auto)    (1.6-8.3)  #


 


Lymph # (Auto)    (0.6-5.0)  #


 


Mono # (Auto)    (0.0-1.3)  #


 


Eos # (Auto)    (0.0-0.8)  #


 


Baso # (Auto)    (0.0-0.2)  #


 


PT    (9.0-11.1)  sec


 


INR    (1.00-1.24)  


 


APTT    (24.4-33.2)  SECONDS


 


Sodium   131 L  (135-145)  mmol/L


 


Potassium   4.1  (3.5-5.3)  mmol/L


 


Chloride   93 L  (100-110)  mmol/L


 


Carbon Dioxide   33 H  (21-32)  mmol/L


 


BUN   30 H  (7-18)  mg/dL


 


Creatinine   1.6 H  (0.55-1.02)  mg/dL


 


Est Cr Clr Drug Dosing   17.46  mL/min


 


Estimated GFR (MDRD)   30 L  (>60)  


 


BUN/Creatinine Ratio   18.8  (9-20)  


 


Glucose   240 H  ()  mg/dL


 


POC Glucose    ()  mg/dL


 


Calcium   8.8  (8.6-10.2)  mg/dL


 


Total Bilirubin    (0.1-1.3)  mg/dL


 


AST    (5-25)  IU/L


 


ALT    (12-36)  U/L


 


Alkaline Phosphatase    ()  IU/L


 


Troponin I  23.4   (4.0-60.3)  pg/mL


 


NT-Pro-B Natriuret Pep    (<=450)  pg/mL


 


Total Protein    (6.0-8.0)  g/dL


 


Albumin    (3.2-4.6)  g/dL


 


Globulin    g/dL


 


Albumin/Globulin Ratio    











Med Orders - Current: 


                               Current Medications





Acetaminophen (Tylenol Extra Strength)  500 mg PO DAILY [facility]


Acetaminophen (Tylenol Extra Strength)  1,000 mg PO BEDTIME [facility]


   Last Admin: 09/26/20 20:06 Dose:  1,000 mg


   Documented by: 


Hydrocodone Bitart/Acetaminophen (Norco 325-5 Mg)  1 tab PO Q6H PRN


   PRN Reason: Pain


   Last Admin: 09/26/20 16:19 Dose:  1 tab


   Documented by: 


Bisacodyl (Dulcolax)  5 mg PO DAILY PRN


   PRN Reason: Constipation


Docusate Sodium (Colace)  100 mg PO BID PRN


   PRN Reason: Constipation


Furosemide (Lasix)  20 mg PO BID [facility]


Glipizide (Glucotrol Xl)  2.5 mg PO DAILY [facility]


Isosorbide Mononitrate (Imdur)  30 mg PO DAILY@1600 [facility]


   Last Admin: 09/26/20 16:19 Dose:  30 mg


   Documented by: 


Isosorbide Mononitrate (Imdur)  90 mg PO DAILY [facility]


Lisinopril (Prinivil)  10 mg PO DAILY [facility]


Metoprolol Succinate (Toprol Xl)  100 mg PO BEDTIME [facility]


   Last Admin: 09/26/20 20:05 Dose:  100 mg


   Documented by: 


Multivitamins/Minerals/Vitamin C (Tab-A-Daniel)  1 tab PO DAILY [facility]


Nitrofurantoin Macrocrystals (Macrodantin)  50 mg PO MOWEFR@2100 [facility]


Nitroglycerin (Nitrostat)  0.4 mg SL Q5M PRN


   PRN Reason: Chest Pain


Non-Formulary Medication (Fluocinolone Acetonide [Fluocinolone Acetonide])  4 

drop EARBOTH ASDIRECTED PRN


   PRN Reason: Dizziness


Ondansetron HCl (Zofran Odt)  4 mg PO Q6H PRN


   PRN Reason: Nausea/Vomiting


   Last Admin: 09/26/20 16:19 Dose:  4 mg


   Documented by: 


Sodium Chloride (Saline Flush)  10 ml FLUSH ASDIRECTED PRN


   PRN Reason: Keep Vein Open


   Last Admin: 09/26/20 12:50 Dose:  10 ml


   Documented by: 


Warfarin Sodium (Coumadin)  5 mg PO DAILY [facility]





Discontinued Medications





Furosemide (Lasix)  80 mg IVPUSH NOW ONE


   Stop: 09/26/20 14:10


   Last Admin: 09/26/20 14:18 Dose:  80 mg


   Documented by: 











- Exam


Neck: Supple


Lungs: Normal Respiratory Effort, Crackles (Improved)


Cardiovascular: Regular Rate, Irregular Rhythm, Murmurs


Extremities: No Pedal Edema





Sepsis Event Note





- Evaluation


Sepsis Screening Result: No Definite Risk





- Focused Exam


Vital Signs: 


                                   Vital Signs











  Temp Pulse Pulse Resp BP BP Pulse Ox


 


 09/27/20 04:00  97.7 F   67  20   133/67  98


 


 09/26/20 23:28  97.1 F   96  20   124/85  98


 


 09/26/20 20:05   98    123/83  














- Problem List & Annotations


(1) Palliative care status


SNOMED Code(s): 842669874


   Code(s): Z51.5 - ENCOUNTER FOR PALLIATIVE CARE   Status: Acute   Current 

Visit: Yes   





(2) CHF exacerbation


SNOMED Code(s): 995150491, 65808861442630


   Code(s): I50.9 - HEART FAILURE, UNSPECIFIED   Status: Acute   Current Visit: 

Yes   





(3) Chest wall muscle strain


SNOMED Code(s): 867674524


   Code(s): S29.011A - STRAIN OF MUSCLE AND TENDON OF FRONT WALL OF THORAX, INIT

  Status: Acute   Current Visit: No   





(4) Elevated brain natriuretic peptide (BNP) level


SNOMED Code(s): 928012504, 934830191


   Code(s): R79.89 - OTHER SPECIFIED ABNORMAL FINDINGS OF BLOOD CHEMISTRY   

Status: Acute   Current Visit: No   





(5) Ischemic cardiomyopathy


SNOMED Code(s): 926108423


   Code(s): I25.5 - ISCHEMIC CARDIOMYOPATHY   Status: Acute   Current Visit: No 

 





(6) Parkinson disease


SNOMED Code(s): 27579821


   Code(s): G20 - PARKINSON'S DISEASE   Status: Acute   Current Visit: No   





(7) Weakness


SNOMED Code(s): 04887899


   Code(s): R53.1 - WEAKNESS   Status: Acute   Current Visit: No   





(8) Afib


SNOMED Code(s): 90608402


   Code(s): I48.91 - UNSPECIFIED ATRIAL FIBRILLATION   Status: Chronic   Current

Visit: No   


Qualifiers: 


   Atrial fibrillation type: longstanding persistent   Qualified Code(s): I48.11

- Longstanding persistent atrial fibrillation   





(9) Diabetes mellitus type 2, diet-controlled


SNOMED Code(s): 998726927642723, 417409579921217


   Code(s): E11.9 - TYPE 2 DIABETES MELLITUS WITHOUT COMPLICATIONS   Status: 

Chronic   Current Visit: No   





(10) Hyponatremia


SNOMED Code(s): 55240663


   Code(s): E87.1 - HYPO-OSMOLALITY AND HYPONATREMIA   Status: Acute   Current 

Visit: Yes   





- Problem List Review


Problem List Initiated/Reviewed/Updated: Yes





- My Orders


Last 24 Hours: 


My Active Orders





09/26/20 15:48


Acetaminophen/HYDROcodone [Norco 325-5 MG]   1 tab PO Q6H PRN 


Ondansetron [Zofran ODT]   4 mg PO Q6H PRN 





09/26/20 15:49


Intake and Output Strict [RC] 06,14,22 





09/26/20 15:58


Accu Check [Blood Glucose Check, Bedside] [RC] 06,17 


Consult to Case Management/ [CONS] Routine 


Consult to Occupational Therapy [OT Evaluation and Treatment] [CONS] Routine 


Consult to Physical Therapy [PT Evaluation and Treatment] [CONS] Routine 





09/27/20 05:11


EKG 12 Lead [EK] AM 





09/27/20 08:03


metOLazone [Zaroxolyn]   2.5 mg PO ONETIME ONE 





09/27/20 09:00


Furosemide [Lasix]   20 mg PO BID 





09/28/20 06:00


INR,PT,PROTHROMBIN TIME [COAG] DAILY 





09/28/20 21:00


nitrofurantoin macrocrystaL [Macrodantin]   50 mg PO MOWEFR@2100 





09/29/20 06:00


INR,PT,PROTHROMBIN TIME [COAG] DAILY 





09/30/20 06:00


INR,PT,PROTHROMBIN TIME [COAG] DAILY 





10/01/20 06:00


INR,PT,PROTHROMBIN TIME [COAG] DAILY 














- Plan


Plan:: 


1. DC telemetry


2. Lasix 60 mg in the morning and 20 mg in the afternoon.


3. Metolazone 2.5 mg before the morning dose of Lasix 1 time.


4. Ambulate with assist and up in chair.

## 2020-09-28 RX ADMIN — METOPROLOL SUCCINATE SCH MG: 100 TABLET, FILM COATED, EXTENDED RELEASE ORAL at 20:58

## 2020-09-28 NOTE — PCM.PN
- General Info


Date of Service: 09/28/20


Admission Dx/Problem (Free Text): 


Patient states she feels better. She has not a BM in 2 days. She had a large 

when she came in. Shows his old chest pain and that's not any different. She 

denies any leg swelling, shortness of breath fevers or chills








- Patient Data


Vitals - Most Recent: 


                                Last Vital Signs











Temp  97.4 F   09/28/20 00:00


 


Pulse  70   09/28/20 00:00


 


Resp  20   09/28/20 00:00


 


BP  138/63   09/28/20 00:00


 


Pulse Ox  94 L  09/28/20 00:19











Weight - Most Recent: 179 lb 14.4 oz


I&O - Last 24 Hours: 


                                 Intake & Output











 09/27/20 09/28/20 09/28/20





 22:59 06:59 14:59


 


Intake Total 100  


 


Output Total 950 1600 


 


Balance -850 -1600 











Lab Results Last 24 Hours: 


                         Laboratory Results - last 24 hr











  09/27/20 09/27/20 09/28/20 Range/Units





  05:05 17:49 06:45 


 


PT    17.4 H  (9.0-11.1)  sec


 


INR    1.66 H  (1.00-1.24)  


 


POC Glucose  222 H  339 H   ()  mg/dL














  09/28/20 Range/Units





  06:50 


 


PT   (9.0-11.1)  sec


 


INR   (1.00-1.24)  


 


POC Glucose  134 H  ()  mg/dL











Med Orders - Current: 


                               Current Medications





Acetaminophen (Tylenol Extra Strength)  500 mg PO DAILY [facility]


   Last Admin: 09/27/20 09:51 Dose:  500 mg


   Documented by: 


Acetaminophen (Tylenol Extra Strength)  1,000 mg PO BEDTIME [facility]


   Last Admin: 09/27/20 20:58 Dose:  1,000 mg


   Documented by: 


Hydrocodone Bitart/Acetaminophen (Norco 325-5 Mg)  1 tab PO Q6H PRN


   PRN Reason: Pain


   Last Admin: 09/27/20 15:28 Dose:  1 tab


   Documented by: 


Bisacodyl (Dulcolax)  5 mg PO DAILY PRN


   PRN Reason: Constipation


   Last Admin: 09/27/20 09:56 Dose:  5 mg


   Documented by: 


Docusate Sodium (Colace)  100 mg PO BID PRN


   PRN Reason: Constipation


   Last Admin: 09/27/20 09:56 Dose:  100 mg


   Documented by: 


Furosemide (Lasix)  60 mg PO DAILY [facility]


   Last Admin: 09/27/20 09:50 Dose:  60 mg


   Documented by: 


Furosemide (Lasix)  20 mg PO DAILY@1400 [facility]


   Last Admin: 09/27/20 15:28 Dose:  20 mg


   Documented by: 


Glipizide (Glucotrol Xl)  2.5 mg PO DAILY [facility]


Isosorbide Mononitrate (Imdur)  30 mg PO DAILY@1600 [facility]


   Last Admin: 09/27/20 15:32 Dose:  30 mg


   Documented by: 


Isosorbide Mononitrate (Imdur)  90 mg PO DAILY [facility]


   Last Admin: 09/27/20 09:51 Dose:  90 mg


   Documented by: 


Lisinopril (Prinivil)  10 mg PO DAILY [facility]


   Last Admin: 09/27/20 09:49 Dose:  10 mg


   Documented by: 


Metoprolol Succinate (Toprol Xl)  100 mg PO BEDTIME [facility]


   Last Admin: 09/27/20 20:57 Dose:  100 mg


   Documented by: 


Multivitamins/Minerals/Vitamin C (Tab-A-Daniel)  1 tab PO DAILY [facility]


   Last Admin: 09/27/20 09:50 Dose:  1 tab


   Documented by: 


Nitrofurantoin Macrocrystals (Macrodantin)  50 mg PO MOWEFR@2100 [facility]


Nitroglycerin (Nitrostat)  0.4 mg SL Q5M PRN


   PRN Reason: Chest Pain


   Last Admin: 09/27/20 08:19 Dose:  0.4 mg


   Documented by: 


Ondansetron HCl (Zofran Odt)  4 mg PO Q6H PRN


   PRN Reason: Nausea/Vomiting


   Last Admin: 09/26/20 16:19 Dose:  4 mg


   Documented by: 


Polyethylene Glycol (Miralax)  17 gm PO DAILY [facility]


Sitagliptin Phosphate (Januvia)  25 mg PO DAILY [facility]


Sodium Chloride (Saline Flush)  10 ml FLUSH ASDIRECTED PRN


   PRN Reason: Keep Vein Open


   Last Admin: 09/26/20 12:50 Dose:  10 ml


   Documented by: 


Warfarin Sodium (Coumadin)  5 mg PO 1600 [facility]


Warfarin Sodium (Coumadin Sliding Scale)  1 each PO ASDIRECTED [facility]





Discontinued Medications





Furosemide (Lasix)  80 mg IVPUSH NOW ONE


   Stop: 09/26/20 14:10


   Last Admin: 09/26/20 14:18 Dose:  80 mg


   Documented by: 


Glipizide (Glucotrol Xl)  2.5 mg PO DAILY [facility]


   Last Admin: 09/27/20 10:29 Dose:  Not Given


   Documented by: 


Glipizide (Glucotrol Xl)  2.5 mg PO ONETIME ONE


   Stop: 09/27/20 10:16


   Last Admin: 09/27/20 10:12 Dose:  2.5 mg


   Documented by: 


Metolazone (Zaroxolyn)  2.5 mg PO ONETIME ONE


   Stop: 09/27/20 08:04


   Last Admin: 09/27/20 08:18 Dose:  2.5 mg


   Documented by: 


Non-Formulary Medication (Fluocinolone Acetonide [Fluocinolone Acetonide])  4 

drop EARBOTH ASDIRECTED PRN


   PRN Reason: Dizziness


Sitagliptin Phosphate (Januvia)  25 mg PO BID [facility]


   Last Admin: 09/27/20 21:28 Dose:  25 mg


   Documented by: 


Warfarin Sodium (Coumadin)  5 mg PO DAILY [facility]


   Last Admin: 09/27/20 09:50 Dose:  5 mg


   Documented by: 











- Exam


General: Oriented, Severe Distress


Lungs: Normal Respiratory Effort, Crackles (Bases bilateral)


Cardiovascular: Regular Rate, Regular Rhythm, No Murmurs


Extremities: No Pedal Edema





Sepsis Event Note





- Evaluation


Sepsis Screening Result: No Definite Risk





- Focused Exam


Vital Signs: 


                                   Vital Signs











  Temp Temp Pulse Pulse Resp BP BP


 


 09/28/20 00:19       


 


 09/28/20 00:00  97.4 F    70  20   138/63


 


 09/27/20 20:57    96    158/94 H 


 


 09/27/20 20:56   97.8 F   96  20   158/94 H














  Pulse Ox Pulse Ox


 


 09/28/20 00:19   94 L


 


 09/28/20 00:00  94 L 


 


 09/27/20 20:57  


 


 09/27/20 20:56  97 














- Problem List & Annotations


(1) Palliative care status


SNOMED Code(s): 985226309


   Code(s): Z51.5 - ENCOUNTER FOR PALLIATIVE CARE   Status: Acute   Current 

Visit: Yes   





(2) CHF exacerbation


SNOMED Code(s): 492853311, 51292774466911


   Code(s): I50.9 - HEART FAILURE, UNSPECIFIED   Status: Acute   Current Visit: 

Yes   





(3) Chest wall muscle strain


SNOMED Code(s): 598881714


   Code(s): S29.011A - STRAIN OF MUSCLE AND TENDON OF FRONT WALL OF THORAX, INIT

  Status: Acute   Current Visit: No   





(4) Elevated brain natriuretic peptide (BNP) level


SNOMED Code(s): 570098563, 674552176


   Code(s): R79.89 - OTHER SPECIFIED ABNORMAL FINDINGS OF BLOOD CHEMISTRY   

Status: Acute   Current Visit: No   





(5) Ischemic cardiomyopathy


SNOMED Code(s): 413646224


   Code(s): I25.5 - ISCHEMIC CARDIOMYOPATHY   Status: Acute   Current Visit: No 

 





(6) Parkinson disease


SNOMED Code(s): 85384449


   Code(s): G20 - PARKINSON'S DISEASE   Status: Acute   Current Visit: No   





(7) Weakness


SNOMED Code(s): 97137955


   Code(s): R53.1 - WEAKNESS   Status: Acute   Current Visit: No   





(8) Afib


SNOMED Code(s): 19388971


   Code(s): I48.91 - UNSPECIFIED ATRIAL FIBRILLATION   Status: Chronic   Current

Visit: No   


Qualifiers: 


   Atrial fibrillation type: longstanding persistent   Qualified Code(s): I48.11

- Longstanding persistent atrial fibrillation   





(9) Diabetes mellitus type 2, diet-controlled


SNOMED Code(s): 160299251521128, 390669032035732


   Code(s): E11.9 - TYPE 2 DIABETES MELLITUS WITHOUT COMPLICATIONS   Status: 

Chronic   Current Visit: No   





(10) Hyponatremia


SNOMED Code(s): 65820881


   Code(s): E87.1 - HYPO-OSMOLALITY AND HYPONATREMIA   Status: Acute   Current 

Visit: Yes   





(11) Constipation


SNOMED Code(s): 69940912


   Code(s): K59.00 - CONSTIPATION, UNSPECIFIED   Status: Acute   Current Visit: 

Yes   





- Problem List Review


Problem List Initiated/Reviewed/Updated: Yes





- My Orders


Last 24 Hours: 


My Active Orders





09/27/20 09:00


Furosemide [Lasix]   60 mg PO DAILY 





09/27/20 14:00


Furosemide [Lasix]   20 mg PO DAILY@1400 





09/27/20 16:05


Vital Signs [RC] PER UNIT ROUTINE 





09/28/20 08:30


Warfarin Sliding Scale [Coumadin Sliding Scale]   1 each PO ASDIRECTED 





09/28/20 09:00


SitaGLIPtin [Januvia]   25 mg PO DAILY 


polyethylene glycoL 3350 [MiraLAX]   17 gm PO DAILY 





09/28/20 21:00


nitrofurantoin macrocrystaL [Macrodantin]   50 mg PO MOWEFR@2100 





09/29/20 06:00


INR,PT,PROTHROMBIN TIME [COAG] DAILY 





09/30/20 06:00


INR,PT,PROTHROMBIN TIME [COAG] DAILY 





10/01/20 06:00


INR,PT,PROTHROMBIN TIME [COAG] DAILY 














- Assessment


Assessment:: 


1. Start MiraLAX today daily


2. PT/OT to evaluate today.


3. No changes in Lasix dose.








- Plan


Plan:: 


1. DC telemetry


2. Lasix 60 mg in the morning and 20 mg in the afternoon.


3. Metolazone 2.5 mg before the morning dose of Lasix 1 time.


4. Ambulate with assist and up in chair.

## 2020-09-29 RX ADMIN — HYDROCODONE BITARTRATE AND ACETAMINOPHEN PRN TAB: 5; 325 TABLET ORAL at 08:13

## 2020-09-29 NOTE — PCM.PN
- General Info


Date of Service: 09/29/20


Admission Dx/Problem (Free Text): 


Patient still has not moved her bowels that is a concerning to her. She says she

feels is right there but she can get it to move. She denies any shortness of 

breath that's any worsening normal. No leg swelling. Hours is a little bit of 

chest pain that has not changed.





- Patient Data


Vitals - Most Recent: 


                                Last Vital Signs











Temp  97.8 F   09/28/20 20:58


 


Pulse  76   09/29/20 02:01


 


Resp  20   09/29/20 02:01


 


BP  114/58 L  09/29/20 02:01


 


Pulse Ox  98   09/29/20 02:01











Weight - Most Recent: 176 lb


I&O - Last 24 Hours: 


                                 Intake & Output











 09/28/20 09/29/20 09/29/20





 22:59 06:59 14:59


 


Intake Total 500  


 


Output Total 950 1000 


 


Balance -450 -1000 











Lab Results Last 24 Hours: 


                         Laboratory Results - last 24 hr











  09/28/20 09/29/20 09/29/20 Range/Units





  16:56 05:05 05:55 


 


PT    19.5 H  (9.0-11.1)  sec


 


INR    1.89 H  (1.00-1.24)  


 


POC Glucose  122 H  132 H   ()  mg/dL











Med Orders - Current: 


                               Current Medications





Acetaminophen (Tylenol Extra Strength)  500 mg PO DAILY [facility]


   Last Admin: 09/28/20 09:15 Dose:  500 mg


   Documented by: 


Acetaminophen (Tylenol Extra Strength)  1,000 mg PO BEDTIME [facility]


   Last Admin: 09/28/20 20:55 Dose:  1,000 mg


   Documented by: 


Hydrocodone Bitart/Acetaminophen (Norco 325-5 Mg)  1 tab PO Q6H PRN


   PRN Reason: Pain


   Last Admin: 09/27/20 15:28 Dose:  1 tab


   Documented by: 


Bisacodyl (Dulcolax)  5 mg PO DAILY PRN


   PRN Reason: Constipation


   Last Admin: 09/27/20 09:56 Dose:  5 mg


   Documented by: 


Bisacodyl (Dulcolax)  10 mg RECTAL DAILY PRN


   PRN Reason: Constipation


   Last Admin: 09/28/20 11:06 Dose:  10 mg


   Documented by: 


Docusate Sodium (Colace)  100 mg PO BID PRN


   PRN Reason: Constipation


   Last Admin: 09/27/20 09:56 Dose:  100 mg


   Documented by: 


Furosemide (Lasix)  60 mg PO DAILY [facility]


   Last Admin: 09/28/20 09:15 Dose:  60 mg


   Documented by: 


Furosemide (Lasix)  20 mg PO DAILY@1400 [facility]


   Last Admin: 09/28/20 14:11 Dose:  20 mg


   Documented by: 


Glipizide (Glucotrol)  2.5 mg PO DAILY [facility]


   Last Admin: 09/28/20 10:44 Dose:  2.5 mg


   Documented by: 


Isosorbide Mononitrate (Imdur)  30 mg PO DAILY@1600 [facility]


   Last Admin: 09/28/20 17:12 Dose:  30 mg


   Documented by: 


Isosorbide Mononitrate (Imdur)  90 mg PO DAILY [facility]


   Last Admin: 09/28/20 09:13 Dose:  90 mg


   Documented by: 


Lisinopril (Prinivil)  10 mg PO DAILY [facility]


   Last Admin: 09/28/20 09:22 Dose:  10 mg


   Documented by: 


Metoprolol Succinate (Toprol Xl)  100 mg PO BEDTIME [facility]


   Last Admin: 09/28/20 20:58 Dose:  100 mg


   Documented by: 


Multivitamins/Minerals/Vitamin C (Tab-A-Daniel)  1 tab PO DAILY [facility]


   Last Admin: 09/28/20 09:15 Dose:  1 tab


   Documented by: 


Nitrofurantoin Macrocrystals (Macrodantin)  50 mg PO MOWEFR@2100 [facility]


   Last Admin: 09/28/20 20:55 Dose:  50 mg


   Documented by: 


Nitroglycerin (Nitrostat)  0.4 mg SL Q5M PRN


   PRN Reason: Chest Pain


   Last Admin: 09/27/20 08:19 Dose:  0.4 mg


   Documented by: 


Ondansetron HCl (Zofran Odt)  4 mg PO Q6H PRN


   PRN Reason: Nausea/Vomiting


   Last Admin: 09/26/20 16:19 Dose:  4 mg


   Documented by: 


Polyethylene Glycol (Miralax)  17 gm PO DAILY [facility]


   Last Admin: 09/28/20 14:09 Dose:  17 gm


   Documented by: 


Sitagliptin Phosphate (Januvia)  25 mg PO DAILY [facility]


   Last Admin: 09/28/20 09:14 Dose:  25 mg


   Documented by: 


Sodium Chloride (Saline Flush)  10 ml FLUSH ASDIRECTED PRN


   PRN Reason: Keep Vein Open


   Last Admin: 09/26/20 12:50 Dose:  10 ml


   Documented by: 


Warfarin Sodium (Coumadin)  5 mg PO 1600 [facility]


   Last Admin: 09/28/20 17:11 Dose:  5 mg


   Documented by: 


Warfarin Sodium (Coumadin Sliding Scale)  1 each PO ASDIRECTED [facility]





Discontinued Medications





Furosemide (Lasix)  80 mg IVPUSH NOW ONE


   Stop: 09/26/20 14:10


   Last Admin: 09/26/20 14:18 Dose:  80 mg


   Documented by: 


Glipizide (Glucotrol Xl)  2.5 mg PO DAILY [facility]


   Last Admin: 09/27/20 10:29 Dose:  Not Given


   Documented by: 


Glipizide (Glucotrol Xl)  2.5 mg PO DAILY [facility]


Glipizide (Glucotrol Xl)  2.5 mg PO ONETIME ONE


   Stop: 09/27/20 10:16


   Last Admin: 09/27/20 10:12 Dose:  2.5 mg


   Documented by: 


Metolazone (Zaroxolyn)  2.5 mg PO ONETIME ONE


   Stop: 09/27/20 08:04


   Last Admin: 09/27/20 08:18 Dose:  2.5 mg


   Documented by: 


Non-Formulary Medication (Fluocinolone Acetonide [Fluocinolone Acetonide])  4 

drop EARBOTH ASDIRECTED PRN


   PRN Reason: Dizziness


Sitagliptin Phosphate (Januvia)  25 mg PO BID [facility]


   Last Admin: 09/27/20 21:28 Dose:  25 mg


   Documented by: 


Warfarin Sodium (Coumadin)  5 mg PO DAILY [facility]


   Last Admin: 09/27/20 09:50 Dose:  5 mg


   Documented by: 











- Exam


General: Alert, Oriented


Lungs: Normal Respiratory Effort, Crackles (Improved)


Cardiovascular: Regular Rate, Regular Rhythm, No Murmurs


GI/Abdominal Exam: Soft, Non-Tender, No Distention


Extremities: No Pedal Edema





Sepsis Event Note





- Evaluation


Sepsis Screening Result: No Definite Risk





- Focused Exam


Vital Signs: 


                                   Vital Signs











  Temp Pulse Pulse Resp BP BP Pulse Ox


 


 09/29/20 02:01    76  20   114/58 L  98


 


 09/28/20 20:58  97.8 F  74  74  20  139/76  139/76  100














- Problem List & Annotations


(1) Palliative care status


SNOMED Code(s): 090608845


   Code(s): Z51.5 - ENCOUNTER FOR PALLIATIVE CARE   Status: Acute   Current 

Visit: Yes   





(2) CHF exacerbation


SNOMED Code(s): 712822641, 95295016377034


   Code(s): I50.9 - HEART FAILURE, UNSPECIFIED   Status: Acute   Current Visit: 

Yes   





(3) Chest wall muscle strain


SNOMED Code(s): 223922761


   Code(s): S29.011A - STRAIN OF MUSCLE AND TENDON OF FRONT WALL OF THORAX, INIT

  Status: Acute   Current Visit: No   





(4) Elevated brain natriuretic peptide (BNP) level


SNOMED Code(s): 877868474, 522954647


   Code(s): R79.89 - OTHER SPECIFIED ABNORMAL FINDINGS OF BLOOD CHEMISTRY   

Status: Acute   Current Visit: No   





(5) Ischemic cardiomyopathy


SNOMED Code(s): 861023167


   Code(s): I25.5 - ISCHEMIC CARDIOMYOPATHY   Status: Acute   Current Visit: No 

 





(6) Parkinson disease


SNOMED Code(s): 96635868


   Code(s): G20 - PARKINSON'S DISEASE   Status: Acute   Current Visit: No   





(7) Weakness


SNOMED Code(s): 61591332


   Code(s): R53.1 - WEAKNESS   Status: Acute   Current Visit: No   





(8) Afib


SNOMED Code(s): 07042771


   Code(s): I48.91 - UNSPECIFIED ATRIAL FIBRILLATION   Status: Chronic   Current

Visit: No   


Qualifiers: 


   Atrial fibrillation type: longstanding persistent   Qualified Code(s): I48.11

- Longstanding persistent atrial fibrillation   





(9) Diabetes mellitus type 2, diet-controlled


SNOMED Code(s): 375020901258738, 315292959836409


   Code(s): E11.9 - TYPE 2 DIABETES MELLITUS WITHOUT COMPLICATIONS   Status: 

Chronic   Current Visit: No   





(10) Hyponatremia


SNOMED Code(s): 66418089


   Code(s): E87.1 - HYPO-OSMOLALITY AND HYPONATREMIA   Status: Acute   Current 

Visit: Yes   





(11) Constipation


SNOMED Code(s): 92858740


   Code(s): K59.00 - CONSTIPATION, UNSPECIFIED   Status: Acute   Current Visit: 

Yes   





- Problem List Review


Problem List Initiated/Reviewed/Updated: Yes





- My Orders


Last 24 Hours: 


My Active Orders





09/28/20 08:30


Warfarin Sliding Scale [Coumadin Sliding Scale]   1 each PO ASDIRECTED 





09/28/20 09:00


SitaGLIPtin [Januvia]   25 mg PO DAILY 


glipiZIDE [Glucotrol]   2.5 mg PO DAILY 


polyethylene glycoL 3350 [MiraLAX]   17 gm PO DAILY 





09/28/20 10:12


bisacodyL [Dulcolax]   10 mg RECTAL DAILY PRN 





09/28/20 21:00


nitrofurantoin macrocrystaL [Macrodantin]   50 mg PO MOWEFR@2100 





09/30/20 06:00


INR,PT,PROTHROMBIN TIME [COAG] DAILY 





10/01/20 06:00


INR,PT,PROTHROMBIN TIME [COAG] DAILY 














- Plan


Plan:: 


1. She is on MiraLAX now and tolerating. Her son says she doesn't like it but 

she is maximum of the things and it appears to be going well.


2. Start some positive get her moving her bowels.

## 2020-09-29 NOTE — PCM.DCSUM1
**Discharge Summary





- Hospital Course


Free Text/Narrative:: 


Hospital course-patient had a large BM before she was transferred to the floor 

which made her feel much better.  She is put on some IV Lasix initially had a 

good diuresis and then paced her on oral 60 mg a morning and 20 in the 

afternoon.  Patient did really well and her breathing improved.  She's till had 

some positive constipation went 2 days with no BM and had a MRSA positive from 

[facility] and she moved her bowels per she ounces over the chest pain but that was 

not any different than normal.  She denies leg swelling, fevers, chills.  The 

INR was managed by pharmacy.  Her blood sugars elevated and I allergen to be 

which brought her blood sugars down nicely.





Brief History: This is an 88-year-old female patient lives with herself with 

known history of CHF, angina, COPD, atrial fibrillation has 5 days of feeling 

weak and short of breath. She's been sitting up in her bed in a chair. She uses 

2 pillows to sleep. She's gained about 9 pounds she states her home scale. She 

denies leg swelling. She has chronic rhinitis but no coughing, wheezing, fevers,

chills. She's not been able to move her bowels for 5 days and had some abdominal

pain and distention. She denies dysuria, pyuria, hematuria. She has had some 

chest pain which is chronic condition for. She says isosorbide is help that. 

It's better at this point when I'm talking to her.


Diagnosis: Stroke: No





- Discharge Data


Discharge Date: 09/29/20


Discharge Disposition: Home, W Home Health Agency 06


Condition: Good





- Referral to Home Health


Date of Face to Face Encounter: 09/29/20


Reason for Homebound Status: CHF poorly controlled, chronic constipation, 

weakness, diabetes


Primary Care Physician: 


Goldy Whitney MD





Skilled Need: 1. Medication teaching, home safety, strengthening, ambulation, 

daily weights





- Discharge Diagnosis/Problem(s)


(1) Palliative care status


SNOMED Code(s): 749139939


   ICD Code: Z51.5 - ENCOUNTER FOR PALLIATIVE CARE   Status: Acute   Current 

Visit: Yes   





(2) CHF exacerbation


SNOMED Code(s): 685041100, 69375424731691


   ICD Code: I50.9 - HEART FAILURE, UNSPECIFIED   Status: Acute   Current Visit:

Yes   





(3) Chest wall muscle strain


SNOMED Code(s): 394598236


   ICD Code: S29.011A - STRAIN OF MUSCLE AND TENDON OF FRONT WALL OF THORAX, 

INIT   Status: Acute   Current Visit: No   





(4) Elevated brain natriuretic peptide (BNP) level


SNOMED Code(s): 566512980, 623020189


   ICD Code: R79.89 - OTHER SPECIFIED ABNORMAL FINDINGS OF BLOOD CHEMISTRY   

Status: Acute   Current Visit: No   





(5) Ischemic cardiomyopathy


SNOMED Code(s): 157477560


   ICD Code: I25.5 - ISCHEMIC CARDIOMYOPATHY   Status: Acute   Current Visit: No

  





(6) Parkinson disease


SNOMED Code(s): 67997548


   ICD Code: G20 - PARKINSON'S DISEASE   Status: Acute   Current Visit: No   





(7) Weakness


SNOMED Code(s): 76996262


   ICD Code: R53.1 - WEAKNESS   Status: Acute   Current Visit: No   





(8) Afib


SNOMED Code(s): 58104146


   ICD Code: I48.91 - UNSPECIFIED ATRIAL FIBRILLATION   Status: Chronic   

Current Visit: No   


Qualifiers: 


   Atrial fibrillation type: longstanding persistent   Qualified Code(s): I48.11

- Longstanding persistent atrial fibrillation   





(9) Diabetes mellitus type 2, diet-controlled


SNOMED Code(s): 416140514694719, 552504058381798


   ICD Code: E11.9 - TYPE 2 DIABETES MELLITUS WITHOUT COMPLICATIONS   Status: 

Chronic   Current Visit: No   





(10) Hyponatremia


SNOMED Code(s): 07490413


   ICD Code: E87.1 - HYPO-OSMOLALITY AND HYPONATREMIA   Status: Acute   Current 

Visit: Yes   





(11) Constipation


SNOMED Code(s): 45812266


   ICD Code: K59.00 - CONSTIPATION, UNSPECIFIED   Status: Acute   Current Visit:

Yes   





- Patient Summary/Data


Consults: 


                                  Consultations





09/26/20 15:58


Consult to Case Management/ [CONS] Routine 


   Comment: 


   Physician Instructions: 


   Service(s) to be Consulted: 


Consult to Occupational Therapy [OT Evaluation and Treatment] [CONS] Routine 


   Please Evaluate and Treat.


   OT Reason for Consult: Strengthening


   This query below is only for informational purposes and is not editable.


   Admission Diagnosis/Problem: CHF, Congestive heart failure


Consult to Physical Therapy [PT Evaluation and Treatment] [CONS] Routine 


   Please Evaluate and Treat.


   PT Reason for Consult: Strengthening


   This query below is only for informational purposes and is not editable.


   Admission Diagnosis/Problem: CHF, Congestive heart failure














- Patient Instructions


Diet: Low Sodium, Diabetic Diet


Activity: As Tolerated


Driving: Do Not Drive


Showering/Bathing: May Shower


Other/Special Instructions: 1. Recheck with Dr. Whitney in 1 week.  2. PT/OT/home 

health





- Discharge Plan


Prescriptions/Med Rec: 


SitaGLIPtin [Januvia] 25 mg PO DAILY #0 tablet


Furosemide [Lasix] 20 mg PO DAILY@1400 #30 tablet


Furosemide [Lasix] 60 mg PO DAILY #30 tablet


polyethylene glycoL 3350 [MiraLAX] 17 gm PO DAILY #500 packet


Sodium Chloride 0.9% [Saline Flush] 10 ml FLUSH ASDIRECTED PRN #30 syringe


 PRN Reason: Keep Vein Open


Home Medications: 


                                    Home Meds





Docusate Sodium 100 mg PO BID PRN 12/31/19 [History]


Fluocinolone Acetonide 4 drop EARBOTH ASDIRECTED PRN 12/31/19 [History]


Isosorbide Mononitrate [Imdur] 90 mg PO DAILY 12/31/19 [History]


Metoprolol Succinate [Toprol XL 100mg] 100 mg PO BEDTIME 12/31/19 [History]


Nitroglycerin 0.4 mg SL Q5M PRN 12/31/19 [History]


Peppermint Oil Cap 1 cap PO BEDTIME 12/31/19 [History]


Triamcinolone Acetonide [Triamcinolone Acetonide 0.1% Crm] 1 applic TOP BID PRN 

12/31/19 [History]


Ubidecarenone [Coenzyme Q10] 100 mg PO QPM 12/31/19 [History]


Warfarin [Coumadin] 5 mg PO SUTUWETHSA 12/31/19 [History]


lisinopriL [Zestril] 10 mg PO DAILY 12/31/19 [History]


nitrofurantoin macrocrystaL [Macrodantin] 50 mg PO MOWEFR@2100 12/31/19 

[History]


Acetaminophen [Tylenol Extra Strength] 1,000 mg PO BEDTIME 08/13/20 [History]


Cholecalciferol (Vitamin D3) [Vitamin D3] 10 mcg PO DAILY 08/13/20 [History]


Acetaminophen [Tylenol Extra Strength] 500 mg PO DAILY  tablet 08/17/20 [Rx]


Ascorbic Acid [Vitamin C] 1,000 mg PO BEDTIME  tablet 08/17/20 [Rx]


Isosorbide Mononitrate [Imdur] 30 mg PO DAILY@1600  tab.er 08/17/20 [Rx]


Multivitamins [Tab-A-Daniel] 1 tab PO DAILY  tablet 08/17/20 [Rx]


bisacodyL [Dulcolax] 5 mg PO DAILY PRN  tablet 08/17/20 [Rx]


glipiZIDE [Glucotrol XL] 2.5 mg PO DAILY #30 tab.er.24 08/21/20 [Rx]


Warfarin [Coumadin] 2.5 mg PO MOFR 09/26/20 [History]


Ranolazine [Ranolazine ER] 500 mg PO BID 09/28/20 [History]


Furosemide [Lasix] 20 mg PO DAILY@1400 #30 tablet 09/29/20 [Rx]


Furosemide [Lasix] 60 mg PO DAILY #30 tablet 09/29/20 [Rx]


SitaGLIPtin [Januvia] 25 mg PO DAILY #0 tablet 09/29/20 [Rx]


Sodium Chloride 0.9% [Saline Flush] 10 ml FLUSH ASDIRECTED PRN #30 syringe 

09/29/20 [Rx]


polyethylene glycoL 3350 [MiraLAX] 17 gm PO DAILY #500 packet 09/29/20 [Rx]








Patient Handouts:  Chronic Obstructive Pulmonary Disease Exacerbation, 

Easy-to-Read, Type 2 Diabetes Mellitus, Diagnosis, Adult, Heart Failure, 

Diagnosis, Easy-to-Read, Fall Prevention in Hospitals, Adult, Venous 

Thromboembolism Prevention


Forms:  ED Department Discharge


Referrals: 


Goldy Whitney MD [Primary Care Provider] - 





- Discharge Summary/Plan Comment


DC Time >30 min.: No





- Patient Data


Vitals - Most Recent: 


                                Last Vital Signs











Temp  97.6 F   09/29/20 07:25


 


Pulse  72   09/29/20 07:25


 


Resp  22 H  09/29/20 07:25


 


BP  133/76   09/29/20 08:16


 


Pulse Ox  100   09/29/20 07:25











Weight - Most Recent: 176 lb


I&O - Last 24 hours: 


                                 Intake & Output











 09/28/20 09/29/20 09/29/20





 22:59 06:59 14:59


 


Intake Total 500  


 


Output Total 950 1000 400


 


Balance -450 -1000 -400











Lab Results - Last 24 hrs: 


                         Laboratory Results - last 24 hr











  09/28/20 09/29/20 09/29/20 Range/Units





  16:56 05:05 05:55 


 


PT    19.5 H  (9.0-11.1)  sec


 


INR    1.89 H  (1.00-1.24)  


 


POC Glucose  122 H  132 H   ()  mg/dL











Med Orders - Current: 


                               Current Medications





Acetaminophen (Tylenol Extra Strength)  500 mg PO DAILY [facility]


   Last Admin: 09/29/20 08:18 Dose:  500 mg


   Documented by: 


Acetaminophen (Tylenol Extra Strength)  1,000 mg PO BEDTIME [facility]


   Last Admin: 09/28/20 20:55 Dose:  1,000 mg


   Documented by: 


Hydrocodone Bitart/Acetaminophen (Norco 325-5 Mg)  1 tab PO Q6H PRN


   PRN Reason: Pain


   Last Admin: 09/29/20 08:13 Dose:  1 tab


   Documented by: 


Bisacodyl (Dulcolax)  5 mg PO DAILY PRN


   PRN Reason: Constipation


   Last Admin: 09/29/20 08:14 Dose:  5 mg


   Documented by: 


Bisacodyl (Dulcolax)  10 mg RECTAL DAILY PRN


   PRN Reason: Constipation


   Last Admin: 09/28/20 11:06 Dose:  10 mg


   Documented by: 


Bisacodyl (Dulcolax)  10 mg RECTAL DAILY [facility]


   Last Admin: 09/29/20 08:49 Dose:  10 mg


   Documented by: 


Docusate Sodium (Colace)  100 mg PO BID PRN


   PRN Reason: Constipation


   Last Admin: 09/29/20 08:14 Dose:  100 mg


   Documented by: 


Furosemide (Lasix)  60 mg PO DAILY [facility]


   Last Admin: 09/29/20 08:17 Dose:  60 mg


   Documented by: 


Furosemide (Lasix)  20 mg PO DAILY@1400 [facility]


   Last Admin: 09/28/20 14:11 Dose:  20 mg


   Documented by: 


Glipizide (Glucotrol)  2.5 mg PO DAILY [facility]


   Last Admin: 09/29/20 08:14 Dose:  2.5 mg


   Documented by: 


Isosorbide Mononitrate (Imdur)  30 mg PO DAILY@1600 [facility]


   Last Admin: 09/28/20 17:12 Dose:  30 mg


   Documented by: 


Isosorbide Mononitrate (Imdur)  90 mg PO DAILY [facility]


   Last Admin: 09/29/20 08:16 Dose:  90 mg


   Documented by: 


Lisinopril (Prinivil)  10 mg PO DAILY [facility]


   Last Admin: 09/29/20 08:15 Dose:  10 mg


   Documented by: 


Metoprolol Succinate (Toprol Xl)  100 mg PO BEDTIME [facility]


   Last Admin: 09/28/20 20:58 Dose:  100 mg


   Documented by: 


Multivitamins/Minerals/Vitamin C (Tab-A-Daniel)  1 tab PO DAILY [facility]


   Last Admin: 09/29/20 08:15 Dose:  1 tab


   Documented by: 


Nitrofurantoin Macrocrystals (Macrodantin)  50 mg PO MOWEFR@2100 [facility]


   Last Admin: 09/28/20 20:55 Dose:  50 mg


   Documented by: 


Nitroglycerin (Nitrostat)  0.4 mg SL Q5M PRN


   PRN Reason: Chest Pain


   Last Admin: 09/27/20 08:19 Dose:  0.4 mg


   Documented by: 


Ondansetron HCl (Zofran Odt)  4 mg PO Q6H PRN


   PRN Reason: Nausea/Vomiting


   Last Admin: 09/26/20 16:19 Dose:  4 mg


   Documented by: 


Polyethylene Glycol (Miralax)  17 gm PO DAILY [facility]


   Last Admin: 09/29/20 08:12 Dose:  17 gm


   Documented by: 


Sitagliptin Phosphate (Januvia)  25 mg PO DAILY [facility]


   Last Admin: 09/29/20 08:16 Dose:  25 mg


   Documented by: 


Sodium Chloride (Saline Flush)  10 ml FLUSH ASDIRECTED PRN


   PRN Reason: Keep Vein Open


   Last Admin: 09/26/20 12:50 Dose:  10 ml


   Documented by: 


Warfarin Sodium (Coumadin)  5 mg PO 1600 [facility]


   Last Admin: 09/28/20 17:11 Dose:  5 mg


   Documented by: 


Warfarin Sodium (Coumadin Sliding Scale)  1 each PO ASDIRECTED [facility]





Discontinued Medications





Furosemide (Lasix)  80 mg IVPUSH NOW ONE


   Stop: 09/26/20 14:10


   Last Admin: 09/26/20 14:18 Dose:  80 mg


   Documented by: 


Glipizide (Glucotrol Xl)  2.5 mg PO DAILY [facility]


   Last Admin: 09/27/20 10:29 Dose:  Not Given


   Documented by: 


Glipizide (Glucotrol Xl)  2.5 mg PO DAILY [facility]


Glipizide (Glucotrol Xl)  2.5 mg PO ONETIME ONE


   Stop: 09/27/20 10:16


   Last Admin: 09/27/20 10:12 Dose:  2.5 mg


   Documented by: 


Metolazone (Zaroxolyn)  2.5 mg PO ONETIME ONE


   Stop: 09/27/20 08:04


   Last Admin: 09/27/20 08:18 Dose:  2.5 mg


   Documented by: 


Non-Formulary Medication (Fluocinolone Acetonide [Fluocinolone Acetonide])  4 

drop EARBOTH ASDIRECTED PRN


   PRN Reason: Dizziness


Sitagliptin Phosphate (Januvia)  25 mg PO BID [facility]


   Last Admin: 09/27/20 21:28 Dose:  25 mg


   Documented by: 


Warfarin Sodium (Coumadin)  5 mg PO DAILY [facility]


   Last Admin: 09/27/20 09:50 Dose:  5 mg


   Documented by:

## 2020-12-03 ENCOUNTER — HOSPITAL ENCOUNTER (INPATIENT)
Dept: HOSPITAL 7 - FB.ED | Age: 85
LOS: 6 days | Discharge: NURSING, REHAB OR LONG-TERM CARE | DRG: 682 | End: 2020-12-09
Attending: FAMILY MEDICINE | Admitting: EMERGENCY MEDICINE
Payer: MEDICARE

## 2020-12-03 DIAGNOSIS — F32.9: ICD-10-CM

## 2020-12-03 DIAGNOSIS — N18.9: ICD-10-CM

## 2020-12-03 DIAGNOSIS — R79.89: ICD-10-CM

## 2020-12-03 DIAGNOSIS — Z87.01: ICD-10-CM

## 2020-12-03 DIAGNOSIS — G20: ICD-10-CM

## 2020-12-03 DIAGNOSIS — I25.2: ICD-10-CM

## 2020-12-03 DIAGNOSIS — E87.5: ICD-10-CM

## 2020-12-03 DIAGNOSIS — E11.9: ICD-10-CM

## 2020-12-03 DIAGNOSIS — F41.9: ICD-10-CM

## 2020-12-03 DIAGNOSIS — K59.09: ICD-10-CM

## 2020-12-03 DIAGNOSIS — Z88.2: ICD-10-CM

## 2020-12-03 DIAGNOSIS — E66.9: ICD-10-CM

## 2020-12-03 DIAGNOSIS — H35.30: ICD-10-CM

## 2020-12-03 DIAGNOSIS — I50.23: ICD-10-CM

## 2020-12-03 DIAGNOSIS — E11.22: ICD-10-CM

## 2020-12-03 DIAGNOSIS — Z51.5: ICD-10-CM

## 2020-12-03 DIAGNOSIS — E86.0: ICD-10-CM

## 2020-12-03 DIAGNOSIS — Z88.0: ICD-10-CM

## 2020-12-03 DIAGNOSIS — N17.9: Primary | ICD-10-CM

## 2020-12-03 DIAGNOSIS — I48.11: ICD-10-CM

## 2020-12-03 DIAGNOSIS — E87.1: ICD-10-CM

## 2020-12-03 DIAGNOSIS — Z95.5: ICD-10-CM

## 2020-12-03 DIAGNOSIS — Z20.828: ICD-10-CM

## 2020-12-03 DIAGNOSIS — I11.0: ICD-10-CM

## 2020-12-03 DIAGNOSIS — R79.1: ICD-10-CM

## 2020-12-03 DIAGNOSIS — H40.9: ICD-10-CM

## 2020-12-03 DIAGNOSIS — I48.91: ICD-10-CM

## 2020-12-03 DIAGNOSIS — Z79.899: ICD-10-CM

## 2020-12-03 DIAGNOSIS — K59.00: ICD-10-CM

## 2020-12-03 DIAGNOSIS — I25.5: ICD-10-CM

## 2020-12-03 DIAGNOSIS — I13.0: ICD-10-CM

## 2020-12-03 DIAGNOSIS — I69.354: ICD-10-CM

## 2020-12-03 DIAGNOSIS — R18.8: ICD-10-CM

## 2020-12-03 DIAGNOSIS — M19.90: ICD-10-CM

## 2020-12-03 DIAGNOSIS — I50.9: ICD-10-CM

## 2020-12-03 DIAGNOSIS — Z98.49: ICD-10-CM

## 2020-12-03 DIAGNOSIS — Z79.01: ICD-10-CM

## 2020-12-03 DIAGNOSIS — Z79.84: ICD-10-CM

## 2020-12-03 PROCEDURE — U0002 COVID-19 LAB TEST NON-CDC: HCPCS

## 2020-12-03 RX ADMIN — METOPROLOL SUCCINATE SCH MG: 100 TABLET, FILM COATED, EXTENDED RELEASE ORAL at 20:45

## 2020-12-03 RX ADMIN — BUDESONIDE AND FORMOTEROL FUMARATE DIHYDRATE SCH: 160; 4.5 AEROSOL RESPIRATORY (INHALATION) at 20:53

## 2020-12-03 NOTE — EDM.PDOC
ED HPI GENERAL MEDICAL PROBLEM





- General


Chief Complaint: General


Stated Complaint: Constipation


Time Seen by Provider: 20 16:20


Source of Information: Reports: Patient


History Limitations: Reports: No Limitations





- History of Present Illness


INITIAL COMMENTS - FREE TEXT/NARRATIVE: 





Patient presented to the ED because of constipation for 3-5 days and dry cough .

She is supposed to be taking colace 100 mg BID but she's not sure if she is 

taking it right. She also c/o nausea but no vomiting. Denies any abdominal pain,

fever,chills. Her son who is his POA and is in [place] right now want her to 

stay in the hospital and find nursing home placement for her. The son said that 

he have a hard time looking for a nursing home because of the Covid pandemic.





  ** abdomen


Pain Score (Numeric/FACES): 4





- Related Data


                                    Allergies











Allergy/AdvReac Type Severity Reaction Status Date / Time


 


Penicillins Allergy  Rash Verified 20 16:43


 


Sulfa (Sulfonamide Allergy  Rash Verified 20 16:43





Antibiotics)     











Home Meds: 


                                    Home Meds





Docusate Sodium 100 mg PO BID PRN 19 [History]


Fluocinolone Acetonide 4 drop EARBOTH ASDIRECTED PRN 19 [History]


Isosorbide Mononitrate [Imdur] 90 mg PO DAILY 19 [History]


Metoprolol Succinate [Toprol XL 100mg] 100 mg PO BEDTIME 19 [History]


Nitroglycerin 0.4 mg SL Q5M PRN 19 [History]


Peppermint Oil Cap 1 cap PO BEDTIME 19 [History]


Triamcinolone Acetonide [Triamcinolone Acetonide 0.1% Crm] 1 applic TOP BID PRN 

19 [History]


Ubidecarenone [Coenzyme Q10] 100 mg PO QPM 19 [History]


Warfarin [Coumadin] 5 mg PO SUTUWETHSA 19 [History]


lisinopriL [Zestril] 10 mg PO DAILY 19 [History]


nitrofurantoin macrocrystaL [Macrodantin] 50 mg PO MOWEFR 19 [History]


Acetaminophen [Tylenol Extra Strength] 1,000 mg PO BEDTIME 20 [History]


Cholecalciferol (Vitamin D3) [Vitamin D3] 10 mcg PO DAILY 20 [History]


Acetaminophen [Tylenol Extra Strength] 500 mg PO DAILY  tablet 20 [Rx]


Ascorbic Acid [Vitamin C] 1,000 mg PO BEDTIME  tablet 20 [Rx]


Multivitamins [Tab-A-Daniel] 1 tab PO DAILY  tablet 20 [Rx]


bisacodyL [Dulcolax] 5 mg PO DAILY PRN  tablet 20 [Rx]


glipiZIDE [Glucotrol XL] 2.5 mg PO DAILY #30 tab.er.24 20 [Rx]


Warfarin [Coumadin] 2.5 mg PO MOFR 20 [History]


Ranolazine [Ranolazine ER] 500 mg PO BID 20 [History]


Furosemide [Lasix] 20 mg PO DAILY@1400 #30 tablet 20 [Rx]


Furosemide [Lasix] 60 mg PO DAILY #30 tablet 20 [Rx]


SitaGLIPtin [Januvia] 25 mg PO DAILY #0 tablet 20 [Rx]


polyethylene glycoL 3350 [MiraLAX] 17 gm PO DAILY #500 packet 20 [Rx]


Isosorbide Mononitrate [Imdur] 30 mg PO DAILY@1600 20 [History]


Magnesium Citrate 295 ml PO ONETIME #1 bottle 20 [Rx]


Ondansetron [Zofran ODT] 4 mg PO Q4H PRN #5 tab.dis 20 [Rx]


Sennosides/Docusate Sodium [Senna-S] 2 each PO ONETIME #10 tablet 20 [Rx]


polyethylene glycoL 3350 [MiraLAX] 34 gm PO ONETIME #10 packet 20 [Rx]











Past Medical History


HEENT History: Reports: Glaucoma, Macular Degeneration


Cardiovascular History: Reports: Afib, Heart Failure, Hypertension, MI, Stents


Respiratory History: Reports: Pneumonia, Recurrent


Other Respiratory History: Is currently on O2 @ 3L/NC @ home.


Gastrointestinal History: Reports: Diverticulosis


Genitourinary History: Reports: None


Other Genitourinary History: PT HAS STRESS INCONT. VOICED.


OB/GYN History: Reports: Pregnancy


Other OB/GYN History: 


Musculoskeletal History: Reports: Arthritis


Neurological History: Reports: Concussion, CVA, Migraines, Parkinson's, Vertigo


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


Psychiatric History: Reports: Anxiety, Depression


Endocrine/Metabolic History: Reports: Diabetes, Type II, Obesity/BMI 30+


Hematologic History: Reports: Anticoagulation Therapy





- Infectious Disease History


Infectious Disease History: Reports: Shingles





- Past Surgical History


HEENT Surgical History: Reports: Cataract Surgery


Other HEENT Surgeries/Procedures: bilat cataract


Cardiovascular Surgical History: Reports: Coronary Artery Stent


Respiratory Surgical History: Reports: None


GI Surgical History: Reports: Colonoscopy, Other (See Below)


Other GI Surgeries/Procedures: exp lap


Female  Surgical History: Reports: Hysterectomy


Neurological Surgical History: Reports: None





Social & Family History





- Family History


Family Medical History: No Pertinent Family History





- Tobacco Use


Tobacco Use Status *Q: Never Tobacco User





- Caffeine Use


Caffeine Use: Reports: None





- Recreational Drug Use


Recreational Drug Use: No





ED ROS GENERAL





- Review of Systems


Review Of Systems: See Below


Constitutional: Reports: No Symptoms


HEENT: Reports: No Symptoms


Respiratory: Reports: No Symptoms


Cardiovascular: Reports: No Symptoms


Endocrine: Reports: No Symptoms


GI/Abdominal: Reports: Constipation


: Reports: No Symptoms


Musculoskeletal: Reports: No Symptoms


Skin: Reports: No Symptoms


Neurological: Reports: No Symptoms





ED EXAM, GENERAL





- Physical Exam


Exam: See Below


Exam Limited By: No Limitations


General Appearance: Alert, No Apparent Distress


Eye Exam: Bilateral Eye: PERRL


Ears: Normal External Exam


Nose: Normal Inspection, Normal Mucosa, No Blood


Throat/Mouth: Normal Inspection, Normal Lips, Normal Teeth


Head: Atraumatic, Normocephalic


Neck: Normal Inspection, Supple, Non-Tender, Full Range of Motion


Respiratory/Chest: No Respiratory Distress, Lungs Clear, Normal Breath Sounds


Cardiovascular: Normal Peripheral Pulses, Regular Rate, Rhythm, No Edema, No 

Gallop


GI/Abdominal: Normal Bowel Sounds, Soft, Non-Tender, No Organomegaly


Back Exam: Normal Inspection, Full Range of Motion





Course





- Vital Signs


Text/Narrative:: 





abd xray-see result


Vit K 5 mg IV


NS @ 75ml/hr


Last Recorded V/S: 


                                Last Vital Signs











Temp  36.4 C   20 19:53


 


Pulse  65   20 20:45


 


Resp  20   20 19:53


 


BP  121/66   20 20:45


 


Pulse Ox  100   20 19:53














- Orders/Labs/Meds


Orders: 


                               Active Orders 24 hr











 Category Date Time Status


 


 Patient Status [ADT] Routine ADT  20 19:33 Active


 


 EKG Documentation Completion [RC] ASDIRECTED Care  20 19:48 Active


 


 Oxygen Therapy [RC] PRN Care  20 19:33 Active


 


 Pulse Oximetry [RC] CONTINUOUS Care  20 19:39 Active


 


 VTE/DVT Education [RC] Per Unit Routine Care  20 19:33 Active


 


 Vital Signs [RC] Q4H Care  20 19:33 Active


 


 Heart Healthy Diet [DIET] Diet  20 Breakfast Ordered


 


 Abdomen 2V AP Flat Upright [CR] Stat Exams  20 16:52 Taken


 


 BASIC METABOLIC PANEL,BMP [CHEM] AM Lab  20 05:11 Ordered


 


 CBC WITH AUTO DIFF [HEME] AM Lab  20 05:11 Ordered


 


 Acetaminophen [Tylenol Extra Strength] Med  20 09:00 Active





 500 mg PO DAILY   


 


 Ascorbic Acid [Vitamin C] Med  20 21:00 Active





 1,000 mg PO BEDTIME   


 


 Cholecalciferol (Vitamin D3) [Vitamin D3] Med  20 09:00 Active





 10 mcg PO DAILY   


 


 Docusate Sodium [Colace] Med  20 19:48 Active





 100 mg PO BID PRN   


 


 Docusate Sodium/Sennosides [Senna Plus] Med  20 19:33 Active





 1 tab PO BID PRN   


 


 Fluocinolone Acetonide [Fluocinolone Acetonide] Med  20 19:48 Active





 4 drop EARBOTH ASDIRECTED PRN   


 


 Isosorbide Mononitrate [Imdur] Med  20 16:00 Active





 30 mg PO DAILY@1600   


 


 Isosorbide Mononitrate [Imdur] Med  20 09:00 Active





 90 mg PO DAILY   


 


 Metoprolol Succinate [Toprol XL] Med  20 21:00 Active





 100 mg PO BEDTIME   


 


 Multivitamins [Tab-A-Daniel] Med  20 09:00 Active





 1 tab PO DAILY   


 


 Nitroglycerin [Nitrostat] Med  12/03/20 19:48 Active





 0.4 mg SL Q5M PRN   


 


 Ondansetron [Zofran] Med  20 19:45 Active





 4 mg IVPUSH Q4H PRN   


 


 Peppermint Oil Cap Med  20 21:00 Active





 1 cap PO BEDTIME   


 


 Ranolazine [Ranolazine ER] Med  20 21:00 Active





 500 mg PO BID   


 


 SitaGLIPtin [Januvia] Med  20 09:00 Active





 25 mg PO DAILY   


 


 Sodium Chloride 0.9% [Normal Saline] 1,000 ml Med  20 20:00 Active





 IV ASDIRECTED   


 


 Triamcinolone Acetonide [Triamcinolone Acetonide 0.1% Med  20 19:48 

Active





 Crm]   





 0 gm TOP BID PRN   


 


 Ubidecarenone [Coenzyme Q10] Med  20 17:00 Active





 100 mg PO QPM   


 


 bisacodyL [Dulcolax] Med  20 19:48 Active





 5 mg PO DAILY PRN   


 


 glipiZIDE [Glucotrol XL] Med  20 09:00 Active





 2.5 mg PO DAILY   


 


 polyethylene glycoL 3350 [MiraLAX] Med  20 09:00 Active





 17 gm PO DAILY   


 


 polyethylene glycoL 3350 [MiraLAX] Med  20 19:33 Active





 17 gm PO DAILY PRN   


 


 Resuscitation Status Routine Resus Stat  20 19:33 Ordered


 


 EKG 12 Lead [EK] Routine Ther  20 19:48 Ordered








                                Medication Orders





Acetaminophen (Tylenol Extra Strength)  500 mg PO DAILY [facility]


Ascorbic Acid (Vitamin C)  1,000 mg PO BEDTIME [facility]


   Last Admin: 20 20:53 Dose:  Not Given


   Documented by: DREW


Bisacodyl (Dulcolax)  5 mg PO DAILY PRN


   PRN Reason: Constipation


Coenzyme Q10 (Coenzyme Q10)  100 mg PO QPM [facility]


Docusate Sodium (Colace)  100 mg PO BID PRN


   PRN Reason: Constipation


Glipizide (Glucotrol Xl)  2.5 mg PO DAILY [facility]


Sodium Chloride (Normal Saline)  1,000 mls @ 75 mls/hr IV ASDIRECTED KWABENA


   Last Admin: 20 20:25  Dose: 75 mls/hr


   Documented by: DREW


Isosorbide Mononitrate (Imdur)  30 mg PO DAILY@1600 [facility]


Isosorbide Mononitrate (Imdur)  90 mg PO DAILY [facility]


Metoprolol Succinate (Toprol Xl)  100 mg PO BEDTIME [facility]


   Last Admin: 20 20:45  Dose: 100 mg


   Documented by: DREW


Multivitamins/Minerals/Vitamin C (Tab-A-Daniel)  1 tab PO DAILY [facility]


Nitroglycerin (Nitrostat)  0.4 mg SL Q5M PRN


   PRN Reason: Chest Pain


Non-Formulary Medication (Cholecalciferol (Vitamin D3) [Vitamin D3])  10 mcg PO 

DAILY [facility]


Non-Formulary Medication (Fluocinolone Acetonide [Fluocinolone Acetonide])  4 

drop EARBOTH ASDIRECTED PRN


   PRN Reason: Dizziness


Non-Formulary Medication (Peppermint Oil Cap)  1 cap PO BEDTIME [facility]


   Last Admin: 20 20:52 Dose:  Not Given


   Documented by: DREW


Non-Formulary Medication (Ranolazine [Ranolazine Er])  500 mg PO BID [facility]


   Last Admin: 20 20:53 Dose:  Not Given


   Documented by: DREW


Ondansetron HCl (Zofran)  4 mg IVPUSH Q4H PRN


   PRN Reason: Nausea/Vomiting


Polyethylene Glycol (Miralax)  17 gm PO DAILY PRN


   PRN Reason: Constipation


Polyethylene Glycol (Miralax)  17 gm PO DAILY [facility]


Senna/Docusate Sodium (Senna Plus)  1 tab PO BID PRN


   PRN Reason: Constipation


Sitagliptin Phosphate (Januvia)  25 mg PO DAILY [facility]


Triamcinolone Acetonide (Triamcinolone Acetonide 0.1% Crm)  0 gm TOP BID PRN


   PRN Reason: Rash








Labs: 


                                Laboratory Tests











  20 Range/Units





  18:24 18:25 18:25 


 


WBC   8.1   (3.0-10.3)  x10-3/uL


 


RBC   4.25   (3.60-5.20)  x10(6)uL


 


Hgb   13.3   (11.4-15.5)  g/dL


 


Hct   40.9   (34.2-48.2)  %


 


MCV   96.3   (76.7-100.5)  fL


 


MCH   31.2   (23.9-33.9)  pg


 


MCHC   32.4   (31.9-34.8)  g/dL


 


RDW   16.2   (12.3-16.5)  %


 


Plt Count   176   (151-488)  x10(3)uL


 


MPV   11.0   (7.1-12.4)  fL


 


Neut % (Auto)   61.0   (30.8-76.2)  %


 


Lymph % (Auto)   24.7   (18.4-52.1)  %


 


Mono % (Auto)   11.3   (4.4-15.7)  %


 


Eos % (Auto)   2.2   (0.6-8.1)  %


 


Baso % (Auto)   0.8   (0.2-1.5)  %


 


Neut # (Auto)   4.9   (1.5-6.3)  x10-3/uL


 


Lymph # (Auto)   2.0   (1.0-4.4)  x10-3/uL


 


Mono # (Auto)   0.9   (0.3-1.0)  x10-3/uL


 


Eos # (Auto)   0.2   (0.0-0.8)  x10-3/uL


 


Baso # (Auto)   0.1   (0.0-0.1)  x10-3/uL


 


PT    57.8 H*  (9.0-11.1)  sec


 


INR    6.02 H*  (1.00-1.24)  


 


Sodium     (135-145)  mmol/L


 


Potassium     (3.5-5.3)  mmol/L


 


Chloride     (100-110)  mmol/L


 


Carbon Dioxide     (21-32)  mmol/L


 


BUN     (7-18)  mg/dL


 


Creatinine     (0.55-1.02)  mg/dL


 


Est Cr Clr Drug Dosing     mL/min


 


Estimated GFR (MDRD)     (>60)  


 


BUN/Creatinine Ratio     (9-20)  


 


Glucose     ()  mg/dL


 


Calcium     (8.6-10.2)  mg/dL


 


Total Bilirubin     (0.1-1.3)  mg/dL


 


AST     (5-25)  IU/L


 


ALT     (12-36)  U/L


 


Alkaline Phosphatase     ()  IU/L


 


Troponin I     (4.0-60.3)  pg/mL


 


NT-Pro-B Natriuret Pep     (<=450)  pg/mL


 


Total Protein     (6.0-8.0)  g/dL


 


Albumin     (3.2-4.6)  g/dL


 


Globulin     g/dL


 


Albumin/Globulin Ratio     


 


SARS-CoV-2 RNA (TOD)  Negative    (NEGATIVE)  














  20 Range/Units





  18:25 18:25 


 


WBC    (3.0-10.3)  x10-3/uL


 


RBC    (3.60-5.20)  x10(6)uL


 


Hgb    (11.4-15.5)  g/dL


 


Hct    (34.2-48.2)  %


 


MCV    (76.7-100.5)  fL


 


MCH    (23.9-33.9)  pg


 


MCHC    (31.9-34.8)  g/dL


 


RDW    (12.3-16.5)  %


 


Plt Count    (151-488)  x10(3)uL


 


MPV    (7.1-12.4)  fL


 


Neut % (Auto)    (30.8-76.2)  %


 


Lymph % (Auto)    (18.4-52.1)  %


 


Mono % (Auto)    (4.4-15.7)  %


 


Eos % (Auto)    (0.6-8.1)  %


 


Baso % (Auto)    (0.2-1.5)  %


 


Neut # (Auto)    (1.5-6.3)  x10-3/uL


 


Lymph # (Auto)    (1.0-4.4)  x10-3/uL


 


Mono # (Auto)    (0.3-1.0)  x10-3/uL


 


Eos # (Auto)    (0.0-0.8)  x10-3/uL


 


Baso # (Auto)    (0.0-0.1)  x10-3/uL


 


PT    (9.0-11.1)  sec


 


INR    (1.00-1.24)  


 


Sodium  129 L   (135-145)  mmol/L


 


Potassium  5.5 H D   (3.5-5.3)  mmol/L


 


Chloride  90 L   (100-110)  mmol/L


 


Carbon Dioxide  31   (21-32)  mmol/L


 


BUN  56 H D   (7-18)  mg/dL


 


Creatinine  2.7 H*   (0.55-1.02)  mg/dL


 


Est Cr Clr Drug Dosing  10.34   mL/min


 


Estimated GFR (MDRD)  17 L   (>60)  


 


BUN/Creatinine Ratio  20.7 H   (9-20)  


 


Glucose  255 H   ()  mg/dL


 


Calcium  9.2   (8.6-10.2)  mg/dL


 


Total Bilirubin  1.1   (0.1-1.3)  mg/dL


 


AST  339 H* D   (5-25)  IU/L


 


ALT  444 H* D   (12-36)  U/L


 


Alkaline Phosphatase  44 L   ()  IU/L


 


Troponin I   18.3  (4.0-60.3)  pg/mL


 


NT-Pro-B Natriuret Pep   72697 H*  (<=450)  pg/mL


 


Total Protein  7.4   (6.0-8.0)  g/dL


 


Albumin  3.5   (3.2-4.6)  g/dL


 


Globulin  3.9   g/dL


 


Albumin/Globulin Ratio  0.9   


 


SARS-CoV-2 RNA (TOD)    (NEGATIVE)  











Meds: 


Medications











Generic Name Dose Route Start Last Admin





  Trade Name Freq  PRN Reason Stop Dose Admin


 


Acetaminophen  500 mg  20 09:00 





  Tylenol Extra Strength  PO  





  DAILY [facility]  


 


Ascorbic Acid  1,000 mg  20 21:00  20 20:53





  Vitamin C  PO   Not Given





  BEDTIME KWABENA  


 


Bisacodyl  5 mg  20 19:48 





  Dulcolax  PO  





  DAILY PRN  





  Constipation  


 


Coenzyme Q10  100 mg  20 17:00 





  Coenzyme Q10  PO  





  QPM [facility]  


 


Docusate Sodium  100 mg  20 19:48 





  Colace  PO  





  BID PRN  





  Constipation  


 


Glipizide  2.5 mg  20 09:00 





  Glucotrol Xl  PO  





  DAILY [facility]  


 


Sodium Chloride  1,000 mls @ 75 mls/hr  20 20:00  20 20:25





  Normal Saline  IV   75 mls/hr





  ASDIRECTED KWABENA   Administration


 


Isosorbide Mononitrate  30 mg  20 16:00 





  Imdur  PO  





  DAILY@1600 KW[facility]  


 


Isosorbide Mononitrate  90 mg  20 09:00 





  Imdur  PO  





  DAILY [facility]  


 


Metoprolol Succinate  100 mg  20 21:00  20 20:45





  Toprol Xl  PO   100 mg





  BEDTIME KWABENA   Administration


 


Multivitamins/Minerals/Vitamin C  1 tab  20 09:00 





  Tab-A-Daniel  PO  





  DAILY [facility]  


 


Nitroglycerin  0.4 mg  20 19:48 





  Nitrostat  SL  





  Q5M PRN  





  Chest Pain  


 


Non-Formulary Medication  10 mcg  20 09:00 





  Cholecalciferol (Vitamin D3) [Vitamin D3]  PO  





  DAILY K[facility]  


 


Non-Formulary Medication  4 drop  20 19:48 





  Fluocinolone Acetonide [Fluocinolone Acetonide]  EARBOTH  





  ASDIRECTED PRN  





  Dizziness  


 


Non-Formulary Medication  1 cap  20 21:00  20 20:52





  Peppermint Oil Cap  PO   Not Given





  BEDTIME KWABENA  


 


Non-Formulary Medication  500 mg  20 21:00  20 20:53





  Ranolazine [Ranolazine Er]  PO   Not Given





  BID KWABENA  


 


Ondansetron HCl  4 mg  20 19:45 





  Zofran  IVPUSH  





  Q4H PRN  





  Nausea/Vomiting  


 


Polyethylene Glycol  17 gm  20 19:33 





  Miralax  PO  





  DAILY PRN  





  Constipation  


 


Polyethylene Glycol  17 gm  20 09:00 





  Miralax  PO  





  DAILY KW[facility]  


 


Senna/Docusate Sodium  1 tab  20 19:33 





  Senna Plus  PO  





  BID PRN  





  Constipation  


 


Sitagliptin Phosphate  25 mg  20 09:00 





  Januvia  PO  





  DAILY [facility]  


 


Triamcinolone Acetonide  0 gm  20 19:48 





  Triamcinolone Acetonide 0.1% Crm  TOP  





  BID PRN  





  Rash  














Discontinued Medications














Generic Name Dose Route Start Last Admin





  Trade Name Freq  PRN Reason Stop Dose Admin


 


Phytonadione 5 mg/ Sodium  50.5 mls @ 100 mls/hr  20 19:47  20 20:38





  Chloride  IV  20 20:17  100 mls/hr





  NOW ONE   Administration














Departure





- Departure


Time of Disposition: 16:00


Disposition: Admitted As Inpatient 66


Condition: Good


Clinical Impression: 


 Constipation, Dehydration, Acute kidney injury, Hyperkalemia








- Discharge Information





Sepsis Event Note (ED)





- Evaluation


Sepsis Screening Result: No Definite Risk





- Focused Exam


Vital Signs: 


                                   Vital Signs











  Temp Pulse Resp BP Pulse Ox


 


 20 18:30   65  18  116/64  98


 


 20 16:45   65  16  109/60  99


 


 20 16:30   65  18  100/72  100


 


 20 16:15  36.4 C  65  16  116/70  100














- My Orders


Last 24 Hours: 


My Active Orders





20 Breakfast


Heart Healthy Diet [DIET] 





20 16:52


Abdomen 2V AP Flat Upright [CR] Stat 





20 19:33


Patient Status [ADT] Routine 


Oxygen Therapy [RC] PRN 


VTE/DVT Education [RC] Per Unit Routine 


Vital Signs [RC] Q4H 


Docusate Sodium/Sennosides [Senna Plus]   1 tab PO BID PRN 


polyethylene glycoL 3350 [MiraLAX]   17 gm PO DAILY PRN 


Resuscitation Status Routine 





20 19:39


Pulse Oximetry [RC] CONTINUOUS 





20 19:45


Ondansetron [Zofran]   4 mg IVPUSH Q4H PRN 





20 19:48


EKG Documentation Completion [RC] ASDIRECTED 


Docusate Sodium [Colace]   100 mg PO BID PRN 


Fluocinolone Acetonide [Fluocinolone Acetonide]   4 drop EARBOTH ASDIRECTED PRN 


Nitroglycerin [Nitrostat]   0.4 mg SL Q5M PRN 


Triamcinolone Acetonide [Triamcinolone Acetonide 0.1% Crm]   0 gm TOP BID PRN 


bisacodyL [Dulcolax]   5 mg PO DAILY PRN 


EKG 12 Lead [EK] Routine 





20 20:00


Sodium Chloride 0.9% [Normal Saline] 1,000 ml IV ASDIRECTED 





20 21:00


Ascorbic Acid [Vitamin C]   1,000 mg PO BEDTIME 


Metoprolol Succinate [Toprol XL]   100 mg PO BEDTIME 


Peppermint Oil Cap   1 cap PO BEDTIME 


Ranolazine [Ranolazine ER]   500 mg PO BID 





20 05:11


BASIC METABOLIC PANEL,BMP [CHEM] AM 


CBC WITH AUTO DIFF [HEME] AM 





20 09:00


Acetaminophen [Tylenol Extra Strength]   500 mg PO DAILY 


Cholecalciferol (Vitamin D3) [Vitamin D3]   10 mcg PO DAILY 


Isosorbide Mononitrate [Imdur]   90 mg PO DAILY 


Multivitamins [Tab-A-Daniel]   1 tab PO DAILY 


SitaGLIPtin [Januvia]   25 mg PO DAILY 


glipiZIDE [Glucotrol XL]   2.5 mg PO DAILY 


polyethylene glycoL 3350 [MiraLAX]   17 gm PO DAILY 





20 16:00


Isosorbide Mononitrate [Imdur]   30 mg PO DAILY@1600 





20 17:00


Ubidecarenone [Coenzyme Q10]   100 mg PO QPM 














- Assessment/Plan


Last 24 Hours: 


My Active Orders





20 Breakfast


Heart Healthy Diet [DIET] 





20 16:52


Abdomen 2V AP Flat Upright [CR] Stat 





20 19:33


Patient Status [ADT] Routine 


Oxygen Therapy [RC] PRN 


VTE/DVT Education [RC] Per Unit Routine 


Vital Signs [RC] Q4H 


Docusate Sodium/Sennosides [Senna Plus]   1 tab PO BID PRN 


polyethylene glycoL 3350 [MiraLAX]   17 gm PO DAILY PRN 


Resuscitation Status Routine 





20 19:39


Pulse Oximetry [RC] CONTINUOUS 





20 19:45


Ondansetron [Zofran]   4 mg IVPUSH Q4H PRN 





20 19:48


EKG Documentation Completion [RC] ASDIRECTED 


Docusate Sodium [Colace]   100 mg PO BID PRN 


Fluocinolone Acetonide [Fluocinolone Acetonide]   4 drop EARBOTH ASDIRECTED PRN 


Nitroglycerin [Nitrostat]   0.4 mg SL Q5M PRN 


Triamcinolone Acetonide [Triamcinolone Acetonide 0.1% Crm]   0 gm TOP BID PRN 


bisacodyL [Dulcolax]   5 mg PO DAILY PRN 


EKG 12 Lead [EK] Routine 





20 20:00


Sodium Chloride 0.9% [Normal Saline] 1,000 ml IV ASDIRECTED 





20 21:00


Ascorbic Acid [Vitamin C]   1,000 mg PO BEDTIME 


Metoprolol Succinate [Toprol XL]   100 mg PO BEDTIME 


Peppermint Oil Cap   1 cap PO BEDTIME 


Ranolazine [Ranolazine ER]   500 mg PO BID 





20 05:11


BASIC METABOLIC PANEL,BMP [CHEM] AM 


CBC WITH AUTO DIFF [HEME] AM 





20 09:00


Acetaminophen [Tylenol Extra Strength]   500 mg PO DAILY 


Cholecalciferol (Vitamin D3) [Vitamin D3]   10 mcg PO DAILY 


Isosorbide Mononitrate [Imdur]   90 mg PO DAILY 


Multivitamins [Tab-A-Daniel]   1 tab PO DAILY 


SitaGLIPtin [Januvia]   25 mg PO DAILY 


glipiZIDE [Glucotrol XL]   2.5 mg PO DAILY 


polyethylene glycoL 3350 [MiraLAX]   17 gm PO DAILY 





20 16:00


Isosorbide Mononitrate [Imdur]   30 mg PO DAILY@1600 





20 17:00


Ubidecarenone [Coenzyme Q10]   100 mg PO QPM

## 2020-12-04 RX ADMIN — METOPROLOL SUCCINATE SCH MG: 100 TABLET, FILM COATED, EXTENDED RELEASE ORAL at 21:11

## 2020-12-04 RX ADMIN — VITAMIN D, TAB 1000IU (100/BT) SCH MCG: 25 TAB at 21:12

## 2020-12-04 RX ADMIN — BUDESONIDE AND FORMOTEROL FUMARATE DIHYDRATE SCH: 160; 4.5 AEROSOL RESPIRATORY (INHALATION) at 15:53

## 2020-12-04 NOTE — PCM.HP.2
H&P History of Present Illness





- General


Date of Service: 20


Admit Problem/Dx: 


                           Admission Diagnosis/Problem





Admission Diagnosis/Problem      acute kidney insufficiency, supratherapeutic 

INR, CHF exacerbation, constipation, weakness, functional decline in adult.








Source of Information: Patient, EMS Notes Reviewed





- History of Present Illness


Initial Comments - Free Text/Narative: 


Sylwia presented to the ED last night due to constipation for past 3-5 days 

and dry cough. She states she has chronic chest pain, feels more short of 

breath, has not turned up her oxygen, which she wears at home but felt like she 

should. Covid was negative on admission. She also c/o nausea but no vomiting. 

Denies any abdominal pain, fever,chills. Her son who is his POA and is in 

[place] right now want her to stay in the hospital and find nursing home 

placement for her. The son said that he have a hard time looking for a nursing 

home because of the Covid pandemic. She has chronic systolic congestive heart 

failure, last echo 2020 showed EF of 30%, her last Cardiology appointment 

this summer she declined any stress testing or angiogram and had discussed 

hospice. They increased her Imdur to 90 mg in am and 30 mg in the afternoon and 

also started her on Ranolazine 500 mg bid and recently increased to 1000 mg bid 

but patient had not started the new dose yet per home health. On coumadin for 

chronic atrial fibrillation, was found to have supratherapeutic INR of 6.02 in 

ER, BNP 20834, elevated AST/, 444 respectively, Creatinine 2.7 on 

admission, 2.6 this morning. Flat & Upright abdominal x-ray done in ER. 

Corrected sodium for hyperglycemia was 130 this morning. 








  ** abdomen


Pain Score (Numeric/FACES): 4





- Related Data


Allergies/Adverse Reactions: 


                                    Allergies











Allergy/AdvReac Type Severity Reaction Status Date / Time


 


Penicillins Allergy  Rash Verified 20 16:43


 


Sulfa (Sulfonamide Allergy  Rash Verified 20 16:43





Antibiotics)     











Home Medications: 


                                    Home Meds





Docusate Sodium 100 mg PO BID 19 [History]


Fluocinolone Acetonide 4 drop EARBOTH ASDIRECTED PRN 19 [History]


Isosorbide Mononitrate [Imdur] 90 mg PO DAILY 19 [History]


Metoprolol Succinate [Toprol XL 100mg] 100 mg PO BEDTIME 19 [History]


Nitroglycerin 0.4 mg SL Q5M PRN 19 [History]


Peppermint Oil Cap 1 cap PO BEDTIME 19 [History]


Triamcinolone Acetonide [Triamcinolone Acetonide 0.1% Crm] 1 applic TOP BID PRN 

19 [History]


Ubidecarenone [Coenzyme Q10] 100 mg PO BEDTIME 19 [History]


Warfarin [Coumadin] 5 mg PO SUTUWETHFRSA 19 [History]


lisinopriL [Zestril] 10 mg PO DAILY 19 [History]


nitrofurantoin macrocrystaL [Macrodantin] 50 mg PO MOWEFR@2100 19 

[History]


Acetaminophen [Tylenol Extra Strength] 1,000 mg PO BEDTIME 20 [History]


Cholecalciferol (Vitamin D3) [Vitamin D3] 10 mcg PO BEDTIME 20 [History]


Acetaminophen [Tylenol Extra Strength] 500 mg PO DAILY  tablet 20 [Rx]


Ascorbic Acid [Vitamin C] 1,000 mg PO BEDTIME  tablet 20 [Rx]


Multivitamins [Tab-A-Daniel] 1 tab PO DAILY  tablet 20 [Rx]


bisacodyL [Dulcolax] 5 mg PO DAILY PRN  tablet 20 [Rx]


glipiZIDE [Glucotrol XL] 2.5 mg PO DAILY #30 tab.er.24 20 [Rx]


Warfarin [Coumadin] 2.5 mg PO MO 20 [History]


Ranolazine [Ranolazine ER] 500 mg PO BID 20 [History]


Furosemide [Lasix] 20 mg PO DAILY@1400 #30 tablet 20 [Rx]


Furosemide [Lasix] 60 mg PO DAILY #30 tablet 20 [Rx]


SitaGLIPtin [Januvia] 25 mg PO DAILY #0 tablet 20 [Rx]


polyethylene glycoL 3350 [MiraLAX] 17 gm PO DAILY #500 packet 20 [Rx]


Isosorbide Mononitrate [Imdur] 30 mg PO DAILY@1600 20 [History]


Ondansetron [Zofran ODT] 4 mg PO Q4H PRN #5 tab.dis 20 [Rx]











Past Medical History


HEENT History: Reports: Glaucoma, Macular Degeneration


Cardiovascular History: Reports: Afib, Heart Failure, Hypertension, MI, Stents


Respiratory History: Reports: Pneumonia, Recurrent


Other Respiratory History: Is currently on O2 @ 3L/NC @ home.


Gastrointestinal History: Reports: Diverticulosis


Genitourinary History: Reports: None


Other Genitourinary History: PT HAS STRESS INCONT. VOICED.


OB/GYN History: Reports: Pregnancy


Other OB/BYN History: 


Musculoskeletal History: Reports: Arthritis


Neurological History: Reports: Concussion, CVA, Migraines, Parkinson's, Vertigo


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


Psychiatric History: Reports: Anxiety, Depression


Endocrine/Metabolic History: Reports: Diabetes, Type II, Obesity/BMI 30+


Hematologic History: Reports: Anticoagulation Therapy





- Infectious Disease History


Infectious Disease History: Reports: Shingles





- Past Surgical History


HEENT Surgical History: Reports: Cataract Surgery


Other HEENT Surgeries/Procedures: bilat cataract


Cardiovascular Surgical History: Reports: Coronary Artery Stent


Respiratory Surgical History: Reports: None


GI Surgical History: Reports: Colonoscopy, Other (See Below)


Other GI Surgeries/Procedures: exp lap


Female  Surgical History: Reports: Hysterectomy


Neurological Surgical History: Reports: None





Social & Family History





- Family History


Family Medical History: No Pertinent Family History





- Tobacco Use


Tobacco Use Status *Q: Current Status Unknown


Second Hand Smoke Exposure: No





- Caffeine Use


Caffeine Use: Reports: Coffee





- Recreational Drug Use


Recreational Drug Use: No





H&P Review of Systems





- Review of Systems:


Review Of Systems: See Below


General: Reports: Weakness, Diaphoresis.  Denies: Fever, Chills


HEENT: Reports: No Symptoms, Glasses


Pulmonary: Reports: Shortness of Breath, Cough.  Denies: Wheezing


Cardiovascular: Reports: Chest Pain, Dyspnea on Exertion, Edema


Gastrointestinal: Reports: Constipation, Nausea.  Denies: Abdominal Pain, 

Diarrhea, Vomiting


Genitourinary: Reports: No Symptoms


Musculoskeletal: Reports: No Symptoms


Skin: Reports: No Symptoms


Psychiatric: Reports: No Symptoms


Neurological: Reports: No Symptoms


Hematologic/Lymphatic: Reports: Easy Bleeding, Easy Bruising





Exam





- Exam


Exam: See Below





- Vital Signs


Vital Signs: 


                                Last Vital Signs











Temp  97.6 F   20 04:00


 


Pulse  67   20 04:00


 


Resp  18   20 04:00


 


BP  113/60   20 09:41


 


Pulse Ox  98   20 04:00











Weight: 179 lb 9 oz





- Exam


Quality Assessment: Supplemental Oxygen


General: Alert, Oriented, Cooperative.  No: Mild Distress


HEENT: PERRLA, Conjunctiva Clear, Glasses, Other (Dry mucosa membranes)


Neck: Supple, Trachea Midline


Lungs: Normal Respiratory Effort, Decreased Breath Sounds, Crackles (bibasilar).

  No: Wheezing


Cardiovascular: Regular Rate, Irregular Rhythm.  No: Systolic Murmur


GI/Abdominal Exam: Normal Bowel Sounds, Soft, Non-Tender, No Distention


 (Female) Exam: Deferred


Rectal (Female) Exam: Deferred


Extremities: Pedal Edema (1+ to bilateral knees)


Peripheral Pulses: 1+: Radial (L), Radial (R)


Skin: Dry, Intact, Cool


Neuro Extensive - Mental Status: Alert, Oriented x3





- Patient Data


Lab Results Last 24 hrs: 


                         Laboratory Results - last 24 hr











  20 Range/Units





  18:24 18:25 18:25 


 


WBC   8.1   (3.0-10.3)  x10-3/uL


 


RBC   4.25   (3.60-5.20)  x10(6)uL


 


Hgb   13.3   (11.4-15.5)  g/dL


 


Hct   40.9   (34.2-48.2)  %


 


MCV   96.3   (76.7-100.5)  fL


 


MCH   31.2   (23.9-33.9)  pg


 


MCHC   32.4   (31.9-34.8)  g/dL


 


RDW   16.2   (12.3-16.5)  %


 


Plt Count   176   (151-488)  x10(3)uL


 


MPV   11.0   (7.1-12.4)  fL


 


Neut % (Auto)   61.0   (30.8-76.2)  %


 


Lymph % (Auto)   24.7   (18.4-52.1)  %


 


Mono % (Auto)   11.3   (4.4-15.7)  %


 


Eos % (Auto)   2.2   (0.6-8.1)  %


 


Baso % (Auto)   0.8   (0.2-1.5)  %


 


Neut # (Auto)   4.9   (1.5-6.3)  x10-3/uL


 


Lymph # (Auto)   2.0   (1.0-4.4)  x10-3/uL


 


Mono # (Auto)   0.9   (0.3-1.0)  x10-3/uL


 


Eos # (Auto)   0.2   (0.0-0.8)  x10-3/uL


 


Baso # (Auto)   0.1   (0.0-0.1)  x10-3/uL


 


Add Manual Diff     


 


Neutrophils % (Manual)     (46-82)  %


 


Lymphocytes % (Manual)     (13-37)  %


 


Monocytes % (Manual)     (4-12)  %


 


PT    57.8 H*  (9.0-11.1)  sec


 


INR    6.02 H*  (1.00-1.24)  


 


Sodium     (135-145)  mmol/L


 


Potassium     (3.5-5.3)  mmol/L


 


Chloride     (100-110)  mmol/L


 


Carbon Dioxide     (21-32)  mmol/L


 


BUN     (7-18)  mg/dL


 


Creatinine     (0.55-1.02)  mg/dL


 


Est Cr Clr Drug Dosing     mL/min


 


Estimated GFR (MDRD)     (>60)  


 


BUN/Creatinine Ratio     (9-20)  


 


Glucose     ()  mg/dL


 


Calcium     (8.6-10.2)  mg/dL


 


Total Bilirubin     (0.1-1.3)  mg/dL


 


AST     (5-25)  IU/L


 


ALT     (12-36)  U/L


 


Alkaline Phosphatase     ()  IU/L


 


Troponin I     (4.0-60.3)  pg/mL


 


NT-Pro-B Natriuret Pep     (<=450)  pg/mL


 


Total Protein     (6.0-8.0)  g/dL


 


Albumin     (3.2-4.6)  g/dL


 


Globulin     g/dL


 


Albumin/Globulin Ratio     


 


SARS-CoV-2 RNA (TOD)  Negative    (NEGATIVE)  














  20 Range/Units





  18:25 18:25 07:20 


 


WBC    10.5 H  (3.0-10.3)  x10-3/uL


 


RBC    4.50  (3.60-5.20)  x10(6)uL


 


Hgb    13.9  (11.4-15.5)  g/dL


 


Hct    43.3  (34.2-48.2)  %


 


MCV    96.2  (76.7-100.5)  fL


 


MCH    31.0  (23.9-33.9)  pg


 


MCHC    32.2  (31.9-34.8)  g/dL


 


RDW    16.3  (12.3-16.5)  %


 


Plt Count    127 L  (151-488)  x10(3)uL


 


MPV    11.0  (7.1-12.4)  fL


 


Neut % (Auto)     (30.8-76.2)  %


 


Lymph % (Auto)     (18.4-52.1)  %


 


Mono % (Auto)     (4.4-15.7)  %


 


Eos % (Auto)     (0.6-8.1)  %


 


Baso % (Auto)     (0.2-1.5)  %


 


Neut # (Auto)     (1.5-6.3)  x10-3/uL


 


Lymph # (Auto)     (1.0-4.4)  x10-3/uL


 


Mono # (Auto)     (0.3-1.0)  x10-3/uL


 


Eos # (Auto)     (0.0-0.8)  x10-3/uL


 


Baso # (Auto)     (0.0-0.1)  x10-3/uL


 


Add Manual Diff    Yes  


 


Neutrophils % (Manual)    74  (46-82)  %


 


Lymphocytes % (Manual)    20  (13-37)  %


 


Monocytes % (Manual)    6  (4-12)  %


 


PT     (9.0-11.1)  sec


 


INR     (1.00-1.24)  


 


Sodium  129 L    (135-145)  mmol/L


 


Potassium  5.5 H D    (3.5-5.3)  mmol/L


 


Chloride  90 L    (100-110)  mmol/L


 


Carbon Dioxide  31    (21-32)  mmol/L


 


BUN  56 H D    (7-18)  mg/dL


 


Creatinine  2.7 H*    (0.55-1.02)  mg/dL


 


Est Cr Clr Drug Dosing  10.34    mL/min


 


Estimated GFR (MDRD)  17 L    (>60)  


 


BUN/Creatinine Ratio  20.7 H    (9-20)  


 


Glucose  255 H    ()  mg/dL


 


Calcium  9.2    (8.6-10.2)  mg/dL


 


Total Bilirubin  1.1    (0.1-1.3)  mg/dL


 


AST  339 H* D    (5-25)  IU/L


 


ALT  444 H* D    (12-36)  U/L


 


Alkaline Phosphatase  44 L    ()  IU/L


 


Troponin I   18.3   (4.0-60.3)  pg/mL


 


NT-Pro-B Natriuret Pep   42217 H*   (<=450)  pg/mL


 


Total Protein  7.4    (6.0-8.0)  g/dL


 


Albumin  3.5    (3.2-4.6)  g/dL


 


Globulin  3.9    g/dL


 


Albumin/Globulin Ratio  0.9    


 


SARS-CoV-2 RNA (TOD)     (NEGATIVE)  














  20 Range/Units





  07:20 


 


WBC   (3.0-10.3)  x10-3/uL


 


RBC   (3.60-5.20)  x10(6)uL


 


Hgb   (11.4-15.5)  g/dL


 


Hct   (34.2-48.2)  %


 


MCV   (76.7-100.5)  fL


 


MCH   (23.9-33.9)  pg


 


MCHC   (31.9-34.8)  g/dL


 


RDW   (12.3-16.5)  %


 


Plt Count   (151-488)  x10(3)uL


 


MPV   (7.1-12.4)  fL


 


Neut % (Auto)   (30.8-76.2)  %


 


Lymph % (Auto)   (18.4-52.1)  %


 


Mono % (Auto)   (4.4-15.7)  %


 


Eos % (Auto)   (0.6-8.1)  %


 


Baso % (Auto)   (0.2-1.5)  %


 


Neut # (Auto)   (1.5-6.3)  x10-3/uL


 


Lymph # (Auto)   (1.0-4.4)  x10-3/uL


 


Mono # (Auto)   (0.3-1.0)  x10-3/uL


 


Eos # (Auto)   (0.0-0.8)  x10-3/uL


 


Baso # (Auto)   (0.0-0.1)  x10-3/uL


 


Add Manual Diff   


 


Neutrophils % (Manual)   (46-82)  %


 


Lymphocytes % (Manual)   (13-37)  %


 


Monocytes % (Manual)   (4-12)  %


 


PT   (9.0-11.1)  sec


 


INR   (1.00-1.24)  


 


Sodium  127 L  (135-145)  mmol/L


 


Potassium  5.6 H  (3.5-5.3)  mmol/L


 


Chloride  91 L  (100-110)  mmol/L


 


Carbon Dioxide  24  (21-32)  mmol/L


 


BUN  56 H  (7-18)  mg/dL


 


Creatinine  2.6 H*  (0.55-1.02)  mg/dL


 


Est Cr Clr Drug Dosing  12.91  mL/min


 


Estimated GFR (MDRD)  17 L  (>60)  


 


BUN/Creatinine Ratio  21.5 H  (9-20)  


 


Glucose  241 H  ()  mg/dL


 


Calcium  8.4 L  (8.6-10.2)  mg/dL


 


Total Bilirubin   (0.1-1.3)  mg/dL


 


AST   (5-25)  IU/L


 


ALT   (12-36)  U/L


 


Alkaline Phosphatase   ()  IU/L


 


Troponin I   (4.0-60.3)  pg/mL


 


NT-Pro-B Natriuret Pep   (<=450)  pg/mL


 


Total Protein   (6.0-8.0)  g/dL


 


Albumin   (3.2-4.6)  g/dL


 


Globulin   g/dL


 


Albumin/Globulin Ratio   


 


SARS-CoV-2 RNA (TOD)   (NEGATIVE)  











Result Diagrams: 


                                 20 07:20





                                 20 07:20





Sepsis Event Note





- Evaluation


Sepsis Screening Result: No Definite Risk





- Focused Exam


Vital Signs: 


                                   Vital Signs











  Temp Temp Pulse Resp BP BP Pulse Ox


 


 20 09:41      113/60  


 


 20 04:00   97.6 F  67  18   106/73  98


 


 20 23:14  97.3 F   68  18   118/88  98














*Q Meaningful Use (ADM)





- VTE Risk Assess *Q


Each Risk Factor Represents 1 Point: Swollen Legs, Current, Congestive heart 

failure (CHF)


Total Score 1 Point Risk Factors: 2


Each Risk Factor Represents 3 Points: Age 75 Years or Greater


Total Score 3 Point Risk Factors: 3


Each Risk Factor Represents 5 Points: None


Total Score 5 Point Risk Factors: 0





- Problem List


(1) Elevated brain natriuretic peptide (BNP) level


SNOMED Code(s): 553253097, 851612269


   ICD Code: R79.89 - OTHER SPECIFIED ABNORMAL FINDINGS OF BLOOD CHEMISTRY   

Status: Acute   Current Visit: No   Problem Details: BNP 18450, Chest x-ray, 

report pending but Kerley B lines present and pulmonary congestion noted per my 

review. Lasix 20 mg IV given this morning.


Daily weights.    





(2) Acute renal insufficiency


SNOMED Code(s): 960188980


   ICD Code: N28.9 - DISORDER OF KIDNEY AND URETER, UNSPECIFIED   Status: Acute 

  Current Visit: No   Problem Details: Acute Cr 2.6 today, will repeat labs 

tomorrow, diuresis.    





(3) CHF exacerbation


SNOMED Code(s): 946100882, 81033730764778


   ICD Code: I50.9 - HEART FAILURE, UNSPECIFIED   Status: Acute   Current Visit:

 No   Problem Details: EF 30% on last echo at [facility] 2018   


Qualifiers: 


   Heart failure type: systolic   Qualified Code(s): I50.23 - Acute on chronic 

systolic (congestive) heart failure   





(4) Elevated INR


SNOMED Code(s): 231966854


   ICD Code: R79.1 - ABNORMAL COAGULATION PROFILE   Status: Acute   Current 

Visit: No   Problem Details: Coumadin per pharmacy. Hold dose today, adjust as 

needed. Daily INR   





(5) Hyperkalemia


SNOMED Code(s): 54337612


   ICD Code: E87.5 - HYPERKALEMIA   Status: Acute   Current Visit: Yes   Problem

 Details: 5.6 today, Lasix 20 mg IV this morning, repeat labs tomorrow.   





(6) Declining functional status


SNOMED Code(s): 169430043860874


   ICD Code: R53.81 - OTHER MALAISE   Status: Acute   Current Visit: No   

Problem Details: PT/OT evaluate & treat   





(7) Weakness


SNOMED Code(s): 01018201


   ICD Code: R53.1 - WEAKNESS   Status: Acute   Current Visit: No   





(8) Hyponatremia


SNOMED Code(s): 03572404


   ICD Code: E87.1 - HYPO-OSMOLALITY AND HYPONATREMIA   Status: Acute   Current 

Visit: No   





(9) Ischemic cardiomyopathy


SNOMED Code(s): 338892051


   ICD Code: I25.5 - ISCHEMIC CARDIOMYOPATHY   Status: Chronic   Current Visit: 

No   





(10) Palliative care status


SNOMED Code(s): 435488388


   ICD Code: Z51.5 - ENCOUNTER FOR PALLIATIVE CARE   Status: Chronic   Current 

Visit: No   





(11) Afib


SNOMED Code(s): 18686393


   ICD Code: I48.91 - UNSPECIFIED ATRIAL FIBRILLATION   Status: Chronic   

Current Visit: No   


Qualifiers: 


   Atrial fibrillation type: longstanding persistent   Qualified Code(s): I48.11

 - Longstanding persistent atrial fibrillation   





(12) Chronic systolic congestive heart failure, NYHA class 3


SNOMED Code(s): 947728538, 968869168, 443909700


   ICD Code: I50.22 - CHRONIC SYSTOLIC (CONGESTIVE) HEART FAILURE   Status: 

Chronic   Current Visit: No   





(13) Constipation


SNOMED Code(s): 06473626


   ICD Code: K59.00 - CONSTIPATION, UNSPECIFIED   Status: Chronic   Current 

Visit: Yes   Problem Details: acute on chronic. Had bowel movement today, bowel 

sounds active.    





(14) Parkinson disease


SNOMED Code(s): 18647440


   ICD Code: G20 - PARKINSON'S DISEASE   Status: Chronic   Current Visit: No   





(15) Chronic stable angina


SNOMED Code(s): 546152132


   ICD Code: I20.8 - OTHER FORMS OF ANGINA PECTORIS   Status: Chronic   Current 

Visit: Yes   





(16) Diabetes mellitus type 2, diet-controlled


SNOMED Code(s): 841700318366823, 765630670550305


   ICD Code: E11.9 - TYPE 2 DIABETES MELLITUS WITHOUT COMPLICATIONS   Status: 

Chronic   Current Visit: No   


Problem List Initiated/Reviewed/Updated: Yes


Orders Last 24hrs: 


                               Active Orders 24 hr











 Category Date Time Status


 


 Patient Status [ADT] Routine ADT  20 19:33 Active


 


 Daily Weight [Height and Weight] [RC] DAILY Care  20 09:01 Active


 


 Oxygen Therapy [RC] PRN Care  20 19:33 Active


 


 Up With Assistance [RC] ASDIRECTED Care  20 09:01 Active


 


 Up to Chair [RC] ASDIRECTED Care  20 09:01 Active


 


 Vital Signs [RC] 00,04,08,12,16,20 Care  20 19:33 Active


 


 OT Evaluation and Treatment [CONS] Routine Cons  20 09:01 Active


 


 PT Evaluation and Treatment [CONS] Routine Cons  20 09:01 Active


 


 Abdomen 2V AP Flat Upright [CR] Stat Exams  20 16:52 Taken


 


 Chest 2V [CR] Routine Exams  20 08:59 Ordered


 


 COMPREHENSIVE METABOLIC PN,CMP [CHEM] Routine Lab  20 06:00 Ordered


 


 INR,PT,PROTHROMBIN TIME [COAG] DAILY Lab  20 09:50 Ordered


 


 INR,PT,PROTHROMBIN TIME [COAG] DAILY Lab  20 09:50 Ordered


 


 INR,PT,PROTHROMBIN TIME [COAG] DAILY Lab  20 09:50 Ordered


 


 INR,PT,PROTHROMBIN TIME [COAG] DAILY Lab  20 09:50 Ordered


 


 INR,PT,PROTHROMBIN TIME [COAG] Routine Lab  20 09:03 Ordered


 


 Acetaminophen [TylenoL] Med  20 22:58 Active





 650 mg PO Q4H PRN   


 


 Acetaminophen [Tylenol Extra Strength] Med  20 21:00 Active





 1,000 mg PO BEDTIME   


 


 Acetaminophen [Tylenol Extra Strength] Med  20 09:00 Active





 500 mg PO DAILY   


 


 Ascorbic Acid [Vitamin C] Med  20 21:00 Active





 1,000 mg PO BEDTIME   


 


 Cholecalciferol (Vitamin D3) [Vitamin D3] Med  20 21:00 Active





 25 mcg PO BEDTIME   


 


 Docusate Sodium [Colace] Med  20 10:00 Active





 100 mg PO BID   


 


 Docusate Sodium/Sennosides [Senna Plus] Med  20 19:33 Active





 1 tab PO BID PRN   


 


 Fluocinolone Acetonide [Fluocinolone Acetonide] Med  20 19:48 Hold





 4 drop EARBOTH ASDIRECTED PRN   


 


 Isosorbide Mononitrate [Imdur] Med  20 16:00 Active





 30 mg PO DAILY@1600   


 


 Isosorbide Mononitrate [Imdur] Med  20 09:00 Active





 90 mg PO DAILY   


 


 Metoprolol Succinate [Toprol XL] Med  20 21:00 Active





 100 mg PO BEDTIME   


 


 Multivitamins [Tab-A-Daniel] Med  20 09:00 Active





 1 tab PO DAILY   


 


 Nitroglycerin [Nitrostat] Med  20 19:48 Active





 0.4 mg SL Q5M PRN   


 


 Non-Formulary Medication [NF Drug] Med  20 10:00 Active





 0 each PO BID   


 


 Ondansetron [Zofran] Med  20 19:45 Active





 4 mg IVPUSH Q4H PRN   


 


 Peppermint Oil Cap Med  20 21:00 Hold





 1 cap PO BEDTIME   


 


 SitaGLIPtin [Januvia] Med  20 09:00 Active





 25 mg PO DAILY   


 


 Sodium Chloride 0.9% [Normal Saline] 1,000 ml Med  20 20:00 Hold





 IV ASDIRECTED   


 


 Triamcinolone Acetonide [Triamcinolone Acetonide 0.1% Med  20 19:48 

Active





 Crm]   





 0 gm TOP BID PRN   


 


 Ubidecarenone [Coenzyme Q10] Med  20 17:00 Active





 100 mg PO QPM   


 


 Warfarin Sliding Scale [Coumadin Sliding Scale] Med  20 10:00 Pending





 1 each PO ASDIRECTED   


 


 bisacodyL [Dulcolax] Med  20 19:48 Active





 5 mg PO DAILY PRN   


 


 glipiZIDE [Glucotrol] Med  20 10:00 Active





 2.5 mg PO DAILY   


 


 polyethylene glycoL 3350 [MiraLAX] Med  20 09:00 Active





 17 gm PO DAILY   


 


 polyethylene glycoL 3350 [MiraLAX] Med  20 19:33 Active





 17 gm PO DAILY PRN   


 


 Resuscitation Status Routine Resus Stat  20 19:33 Ordered


 


 EKG 12 Lead [EK] Routine Ther  20 19:48 Ordered








                                Medication Orders





Acetaminophen (Tylenol Extra Strength)  500 mg PO DAILY [facility]


   Last Admin: 20 09:44  Dose: 500 mg


   Documented by: KAI


Acetaminophen (Tylenol)  650 mg PO Q4H PRN


   PRN Reason: Pain


   Last Admin: 20 23:18  Dose: 650 mg


   Documented by: DREW


Acetaminophen (Tylenol Extra Strength)  1,000 mg PO BEDTIME KWABENA


Ascorbic Acid (Vitamin C)  1,000 mg PO BEDTIME [facility]


   Last Admin: 20 20:53 Dose:  Not Given


   Documented by: DREW


Bisacodyl (Dulcolax)  5 mg PO DAILY PRN


   PRN Reason: Constipation


Cholecalciferol (Vitamin D3)  25 mcg PO BEDTIME KWABENA


Coenzyme Q10 (Coenzyme Q10)  100 mg PO QPM KWABENA


Docusate Sodium (Colace)  100 mg PO BID [facility]


   Last Admin: 20 09:45  Dose: 100 mg


   Documented by: KAI


Glipizide (Glucotrol)  2.5 mg PO DAILY [facility]


   Last Admin: 20 09:45  Dose: 2.5 mg


   Documented by: KAI


Sodium Chloride (Normal Saline)  1,000 mls @ 75 mls/hr IV ASDIRECTED [facility]


   Last Admin: 20 20:25  Dose: 75 mls/hr


   Documented by: DREW


Isosorbide Mononitrate (Imdur)  30 mg PO DAILY@1600 [facility]


Isosorbide Mononitrate (Imdur)  90 mg PO DAILY [facility]


   Last Admin: 20 09:41  Dose: 90 mg


   Documented by: KAI


Metoprolol Succinate (Toprol Xl)  100 mg PO BEDTIME [facility]


   Last Admin: 20 20:45  Dose: 100 mg


   Documented by: DREW


Multivitamins/Minerals/Vitamin C (Tab-A-Daniel)  1 tab PO DAILY [facility]


   Last Admin: 20 09:44  Dose: 1 tab


   Documented by: KAI


Nitroglycerin (Nitrostat)  0.4 mg SL Q5M PRN


   PRN Reason: Chest Pain


Non-Formulary Medication (Fluocinolone Acetonide [Fluocinolone Acetonide])  4 

drop EARBOTH ASDIRECTED PRN


   PRN Reason: Dizziness


Non-Formulary Medication (Peppermint Oil Cap)  1 cap PO BEDTIME [facility]


   Last Admin: 20 20:52 Dose:  Not Given


   Documented by: DREW


Ranolazine Er 500mg  0 each PO BID [facility]


Ondansetron HCl (Zofran)  4 mg IVPUSH Q4H PRN


   PRN Reason: Nausea/Vomiting


Polyethylene Glycol (Miralax)  17 gm PO DAILY PRN


   PRN Reason: Constipation


Polyethylene Glycol (Miralax)  17 gm PO DAILY [facility]


   Last Admin: 20 09:52  Dose: 17 gm


   Documented by: KAI


Senna/Docusate Sodium (Senna Plus)  1 tab PO BID PRN


   PRN Reason: Constipation


Sitagliptin Phosphate (Januvia)  25 mg PO DAILY [facility]


   Last Admin: 20 09:43  Dose: 25 mg


   Documented by: KAI


Triamcinolone Acetonide (Triamcinolone Acetonide 0.1% Crm)  0 gm TOP BID PRN


   PRN Reason: Rash


Warfarin Sodium (Coumadin Sliding Scale)  1 each PO ASDIRECTED [facility]








Assessment/Plan Comment:: 





1. Admit for inpatient treatment of Acute on chronic Kidney insufficiency, CHF, 

constipation, weakness, function decline, parkinson's.


2. CXR per my review appears to have some mild CHF exacerabation, official 

report pending. Hold IVF and diuresis Lasix 20 mg IV x 1, I&Os, daily weights. 

Continue to monitor and adjust as needed. 


3. Cardiac diet. 


4. Up with assistance and to chair TID.


5. PT/OT evaluate & treat if needed.


6. Discharge planning for placement to due functional decline and family is 

unable to provide the additional services she needs. 


7. Resume home medications.


8. DNR/DNI.





- Mortality Measure


Prognosis:: Poor

## 2020-12-05 RX ADMIN — VITAMIN D, TAB 1000IU (100/BT) SCH MCG: 25 TAB at 20:00

## 2020-12-05 RX ADMIN — METOPROLOL SUCCINATE SCH MG: 100 TABLET, FILM COATED, EXTENDED RELEASE ORAL at 20:00

## 2020-12-05 NOTE — PCM.PN
- General Info


Date of Service: 12/05/20


Subjective Update: 





Sylwia has been pretty uriostegui and lethargic in last 24 hours, daughter stated 

she is usually pale because she doesn't go out but as far as gray appearance she

hasn't been able to see her since last year. States her body aches all over but 

can't tell me if it is same as her chronic pain or different. Has been afebrile.

Chest x-ray showed some CHF exacerbation, elevated LFTs which worsened today. 

Systolic blood pressures are between 90-110s. No abdominal pain. Covid was 

negative but was not tested for influenza on admission.





- Patient Data


Vitals - Most Recent: 


                                Last Vital Signs











Temp  97.4 F   12/05/20 08:00


 


Pulse  64   12/05/20 08:00


 


Resp  24 H  12/05/20 08:00


 


BP  105/60   12/05/20 08:00


 


Pulse Ox  100   12/05/20 08:00











Weight - Most Recent: 179 lb 9 oz


Lab Results Last 24 Hours: 


                         Laboratory Results - last 24 hr











  12/04/20 12/05/20 12/05/20 Range/Units





  12:12 06:35 06:35 


 


PT  23.0 H   17.3 H  (9.0-11.1)  sec


 


INR  2.25 H   1.65 H  (1.00-1.24)  


 


Sodium   128 L   (135-145)  mmol/L


 


Potassium   4.9   (3.5-5.3)  mmol/L


 


Chloride   92 L   (100-110)  mmol/L


 


Carbon Dioxide   26   (21-32)  mmol/L


 


BUN   57 H   (7-18)  mg/dL


 


Creatinine   2.4 H*   (0.55-1.02)  mg/dL


 


Est Cr Clr Drug Dosing   13.99   mL/min


 


Estimated GFR (MDRD)   19 L   (>60)  


 


BUN/Creatinine Ratio   23.8 H   (9-20)  


 


Glucose   177 H   ()  mg/dL


 


Calcium   8.2 L   (8.6-10.2)  mg/dL


 


Total Bilirubin   1.4 H   (0.1-1.3)  mg/dL


 


AST   473 H* D   (5-25)  IU/L


 


ALT   653 H* D   (12-36)  U/L


 


Alkaline Phosphatase   46 L   ()  IU/L


 


Total Protein   6.3   (6.0-8.0)  g/dL


 


Albumin   3.1 L   (3.2-4.6)  g/dL


 


Globulin   3.2   g/dL


 


Albumin/Globulin Ratio   1.0   











Med Orders - Current: 


                               Current Medications





Acetaminophen (Tylenol Extra Strength)  500 mg PO DAILY [facility]


   Last Admin: 12/04/20 09:44 Dose:  500 mg


   Documented by: 


Acetaminophen (Tylenol)  650 mg PO Q4H PRN


   PRN Reason: Pain


   Last Admin: 12/03/20 23:18 Dose:  650 mg


   Documented by: 


Acetaminophen (Tylenol Extra Strength)  1,000 mg PO BEDTIME [facility]


   Last Admin: 12/04/20 21:11 Dose:  1,000 mg


   Documented by: 


Ascorbic Acid (Vitamin C)  1,000 mg PO BEDTIME [facility]


   Last Admin: 12/04/20 21:11 Dose:  1,000 mg


   Documented by: 


Bisacodyl (Dulcolax)  5 mg PO DAILY PRN


   PRN Reason: Constipation


Cholecalciferol (Vitamin D3)  25 mcg PO BEDTIME [facility]


   Last Admin: 12/04/20 21:12 Dose:  25 mcg


   Documented by: 


Coenzyme Q10 (Coenzyme Q10)  100 mg PO QPM [facility]


   Last Admin: 12/04/20 17:01 Dose:  100 mg


   Documented by: 


Docusate Sodium (Colace)  100 mg PO BID [facility]


   Last Admin: 12/04/20 21:10 Dose:  100 mg


   Documented by: 


Furosemide (Lasix)  20 mg PO DAILY@1400 [facility]


Furosemide (Lasix)  60 mg PO DAILY [facility]


Glipizide (Glucotrol)  2.5 mg PO DAILY [facility]


   Last Admin: 12/04/20 09:45 Dose:  2.5 mg


   Documented by: 


Sodium Chloride (Normal Saline)  1,000 mls @ 50 mls/hr IV ASDIRECTED [facility]


   Last Admin: 12/05/20 09:20 Dose:  50 mls/hr


   Documented by: 


Isosorbide Mononitrate (Imdur)  30 mg PO DAILY@1600 [facility]


   Last Admin: 12/04/20 21:10 Dose:  30 mg


   Documented by: 


Isosorbide Mononitrate (Imdur)  90 mg PO DAILY [facility]


   Last Admin: 12/04/20 09:41 Dose:  90 mg


   Documented by: 


Metoprolol Succinate (Toprol Xl)  100 mg PO BEDTIME [facility]


   Last Admin: 12/04/20 21:11 Dose:  100 mg


   Documented by: 


Multivitamins/Minerals/Vitamin C (Tab-A-Daniel)  1 tab PO DAILY [facility]


   Last Admin: 12/04/20 09:44 Dose:  1 tab


   Documented by: 


Nitroglycerin (Nitrostat)  0.4 mg SL Q5M PRN


   PRN Reason: Chest Pain


Non-Formulary Medication (Fluocinolone Acetonide [Fluocinolone Acetonide])  4 

drop EARBOTH ASDIRECTED PRN


   PRN Reason: Dizziness


Non-Formulary Medication (Peppermint Oil Cap)  1 cap PO BEDTIME [facility]


   Last Admin: 12/03/20 20:52 Dose:  Not Given


   Documented by: 


Ondansetron HCl (Zofran)  4 mg IVPUSH Q4H PRN


   PRN Reason: Nausea/Vomiting


Polyethylene Glycol (Miralax)  17 gm PO DAILY PRN


   PRN Reason: Constipation


Polyethylene Glycol (Miralax)  17 gm PO DAILY [facility]


   Last Admin: 12/04/20 09:52 Dose:  17 gm


   Documented by: 


Ranolazine (Ranexa)  1,000 mg PO BID [facility]


   Last Admin: 12/04/20 21:10 Dose:  1,000 mg


   Documented by: 


Senna/Docusate Sodium (Senna Plus)  1 tab PO BID PRN


   PRN Reason: Constipation


Sitagliptin Phosphate (Januvia)  25 mg PO DAILY [facility]


   Last Admin: 12/04/20 09:43 Dose:  25 mg


   Documented by: 


Triamcinolone Acetonide (Triamcinolone Acetonide 0.1% Crm)  0 gm TOP BID PRN


   PRN Reason: Rash


Warfarin Sodium (Coumadin Sliding Scale)  1 each PO ASDIRECTED [facility]


Warfarin Sodium (Coumadin)  4 mg PO ONETIME ONE


   Stop: 12/05/20 16:01





Discontinued Medications





Docusate Sodium (Colace)  100 mg PO BID PRN


   PRN Reason: Constipation


Furosemide (Lasix)  20 mg IVPUSH ONETIME ONE


   Stop: 12/04/20 09:01


   Last Admin: 12/04/20 09:43 Dose:  20 mg


   Documented by: 


Furosemide (Lasix)  40 mg IVPUSH DAILY [facility]


Furosemide (Lasix)  40 mg IVPUSH NOW ONE


   Stop: 12/04/20 16:19


   Last Admin: 12/04/20 16:56 Dose:  40 mg


   Documented by: 


Phytonadione 5 mg/ Sodium (Chloride)  50.5 mls @ 100 mls/hr IV NOW ONE


   Stop: 12/03/20 20:17


   Last Admin: 12/03/20 20:38 Dose:  100 mls/hr


   Documented by: 


Non-Formulary Medication (Ranolazine [Ranolazine Er])  500 mg PO BID [facility]


   Last Admin: 12/04/20 15:53 Dose:  Not Given


   Documented by: 


Ranolazine Er 500mg  0 each PO BID [facility]


   Last Admin: 12/04/20 13:31 Dose:  2 each


   Documented by: 


Warfarin Sodium (Coumadin)  2.5 mg PO ONETIME ONE


   Stop: 12/04/20 16:01


   Last Admin: 12/04/20 16:48 Dose:  2.5 mg


   Documented by: 











- Exam


Quality Assessment: Supplemental Oxygen


General: Alert, Oriented, Cooperative, Lethargic


Lungs: Decreased Breath Sounds (mild increased work of breathing).  No: Wheezing


Cardiovascular: Regular Rate, Irregular Rhythm


GI/Abdominal Exam: Soft, Non-Tender, Abnormal Bowel Sounds (hypoactive.)


Peripheral Pulses: 1+: Radial (L), Radial (R)





Sepsis Event Note





- Evaluation


Sepsis Screening Result: No Definite Risk





- Focused Exam


Vital Signs: 


                                   Vital Signs











  Temp Pulse Resp BP Pulse Ox


 


 12/05/20 08:00  97.4 F  64  24 H  105/60  100


 


 12/05/20 04:00  97.6 F  87  17  92/57 L  99


 


 12/05/20 00:00  97.3 F  67  19  100/65  100














- Problem List & Annotations


(1) LFT elevation


SNOMED Code(s): 615740271, 233012338


   Code(s): R79.89 - OTHER SPECIFIED ABNORMAL FINDINGS OF BLOOD CHEMISTRY   

Status: Acute   Current Visit: Yes   Annotation/Comment:: , , 

Total bilirubin 1.4, will get CT abdomen/pelvis, most likely secondary to CHF 

but will rule out other causes.   





(2) Elevated brain natriuretic peptide (BNP) level


SNOMED Code(s): 892417375, 578243640


   Code(s): R79.89 - OTHER SPECIFIED ABNORMAL FINDINGS OF BLOOD CHEMISTRY   

Status: Acute   Current Visit: No   Annotation/Comment:: had around 300 ml out 

per nursing yesterday, increased after lasix given x 2. LFTs are going up today.

Discussed with daughter she may need to go on hospice due to her heart failure. 

 





(3) Acute renal insufficiency


SNOMED Code(s): 467167876


   Code(s): N28.9 - DISORDER OF KIDNEY AND URETER, UNSPECIFIED   Status: Acute  

Current Visit: No   Annotation/Comment:: Acute Cr 2.4 improved today, will 

repeat labs tomorrow, sodium is 128. Start NS at 50 ml/hr and restart her home 

dose of Lasix this afternoon. As discussed with her daughter yesterday, fine 

balance between overloading her heart with fluids and giving her kidneys enough 

water to function. LFTs are also elevating, most likely due to worsening CHF.   

 





(4) CHF exacerbation


SNOMED Code(s): 203730689, 61106143621934


   Code(s): I50.9 - HEART FAILURE, UNSPECIFIED   Status: Acute   Current Visit: 

No   


Qualifiers: 


   Heart failure type: systolic   Qualified Code(s): I50.23 - Acute on chronic 

systolic (congestive) heart failure   


Annotation/Comment:: Will get CT Chest today to get better picture of her lungs,

worsening shortness of breath, lethargy. Also get influenza test as that can 

present with CHF complications since she is complaining of all over body aches. 

Covid was negative on admission. 


EF 30% on last echo at [facility] 7/17/2018   





(5) Declining functional status


SNOMED Code(s): 689203113381997


   Code(s): R53.81 - OTHER MALAISE   Status: Acute   Current Visit: No   

Annotation/Comment:: PT/OT evaluate & treat   





(6) Weakness


SNOMED Code(s): 12951839


   Code(s): R53.1 - WEAKNESS   Status: Acute   Current Visit: No   





(7) Hyponatremia


SNOMED Code(s): 78405797


   Code(s): E87.1 - HYPO-OSMOLALITY AND HYPONATREMIA   Status: Acute   Current 

Visit: No   Annotation/Comment:: NS at 50 ml/hr, recheck tomorrow, if unable to 

give fluids, would need to give sodium tablets.    





(8) Ischemic cardiomyopathy


SNOMED Code(s): 413345001


   Code(s): I25.5 - ISCHEMIC CARDIOMYOPATHY   Status: Chronic   Current Visit: 

No   





(9) Palliative care status


SNOMED Code(s): 312470301


   Code(s): Z51.5 - ENCOUNTER FOR PALLIATIVE CARE   Status: Chronic   Current 

Visit: No   





(10) Afib


SNOMED Code(s): 34243625


   Code(s): I48.91 - UNSPECIFIED ATRIAL FIBRILLATION   Status: Chronic   Current

Visit: No   


Qualifiers: 


   Atrial fibrillation type: longstanding persistent   Qualified Code(s): I48.11

- Longstanding persistent atrial fibrillation   





(11) Chronic systolic congestive heart failure, NYHA class 3


SNOMED Code(s): 715128664, 654186688, 741096378


   Code(s): I50.22 - CHRONIC SYSTOLIC (CONGESTIVE) HEART FAILURE   Status: 

Chronic   Current Visit: No   





(12) Constipation


SNOMED Code(s): 04039687


   Code(s): K59.00 - CONSTIPATION, UNSPECIFIED   Status: Chronic   Current 

Visit: Yes   Annotation/Comment:: acute on chronic. Had bowel movement today, 

bowel sounds active.    





(13) Parkinson disease


SNOMED Code(s): 40485228


   Code(s): G20 - PARKINSON'S DISEASE   Status: Chronic   Current Visit: No   





(14) Chronic stable angina


SNOMED Code(s): 651143567


   Code(s): I20.8 - OTHER FORMS OF ANGINA PECTORIS   Status: Chronic   Current 

Visit: Yes   





(15) Diabetes mellitus type 2, diet-controlled


SNOMED Code(s): 056606289521339, 241064527390011


   Code(s): E11.9 - TYPE 2 DIABETES MELLITUS WITHOUT COMPLICATIONS   Status: 

Chronic   Current Visit: No   





(16) Hyperkalemia


SNOMED Code(s): 76344183


   Code(s): E87.5 - HYPERKALEMIA   Status: Resolved   Current Visit: Yes   

Annotation/Comment:: 4.9 today.    





(17) Elevated INR


SNOMED Code(s): 458920006


   Code(s): R79.1 - ABNORMAL COAGULATION PROFILE   Status: Resolved   Current 

Visit: No   Annotation/Comment:: Resolved, INR 1.65 today, Warfarin sliding 

scale per pharmacy. Daily INR   





- Problem List Review


Problem List Initiated/Reviewed/Updated: Yes





- My Orders


Last 24 Hours: 


My Active Orders





12/04/20 08:59


Chest 2V [CR] Routine 





12/04/20 09:01


Daily Weight [Height and Weight] [RC] DAILY 


Up With Assistance [RC] ASDIRECTED 


Up to Chair [RC] ASDIRECTED 


OT Evaluation and Treatment [CONS] Routine 


PT Evaluation and Treatment [CONS] Routine 





12/04/20 10:00


Docusate Sodium [Colace]   100 mg PO BID 


Warfarin Sliding Scale [Coumadin Sliding Scale]   1 each PO ASDIRECTED 


glipiZIDE [Glucotrol]   2.5 mg PO DAILY 





12/04/20 21:00


Acetaminophen [Tylenol Extra Strength]   1,000 mg PO BEDTIME 


Ranolazine [Ranexa]   1,000 mg PO BID 





12/05/20 06:35


CBC WITH AUTO DIFF [HEME] Routine 





12/05/20 08:55


Chest Abdomen Pelvis wo Cont [CT] Routine 





12/05/20 14:00


Furosemide [Lasix]   20 mg PO DAILY@1400 





12/05/20 16:00


Warfarin [Coumadin]   4 mg PO ONETIME ONE 





12/06/20 06:00


BASIC METABOLIC PANEL,BMP [CHEM] Routine 





12/06/20 09:00


Furosemide [Lasix]   60 mg PO DAILY 





12/06/20 09:50


INR,PT,PROTHROMBIN TIME [COAG] DAILY 





12/07/20 09:50


INR,PT,PROTHROMBIN TIME [COAG] DAILY 





12/08/20 09:50


INR,PT,PROTHROMBIN TIME [COAG] DAILY 














- Plan


Plan:: 





1. CHF: CT Chest, influenza screen, restart home Lasix. Repeat labs in am.


2. GER: NS at 50 ml/hr, improved with diuresis yesterday but sodium 128. Repeat 

labs tomorrow.


3. LFTs: CT Abdomen/pelvis without contrast. 


4. Will notify family once studies get back, adjust treatments as necessary.

## 2020-12-06 RX ADMIN — VITAMIN D, TAB 1000IU (100/BT) SCH MCG: 25 TAB at 20:05

## 2020-12-06 RX ADMIN — METOPROLOL SUCCINATE SCH MG: 100 TABLET, FILM COATED, EXTENDED RELEASE ORAL at 20:05

## 2020-12-06 NOTE — PCM.PN
- General Info


Date of Service: 12/06/20


Subjective Update: 





Sylwia has been stable overnight, they stopped IV fluids at 2am due to 

increased crackles in lungs. Blood pressures are the same as yesterday. No 

complaint of pain this morning. She ate part of her breakfast this morning and 

is asking for water. No bowel movement reported overnight. Placed sánchez 

yesterday for more strict I&Os to monitor her output. CT chest/abdomen/pelvis 

showed pulmonary edema, small pleural effusions, no consolidations or 

infiltrates, ascites, abdominal wall edema. Face more puffy today. 


Functional Status: Reports: Tolerating Diet, Urinating





- Patient Data


Vitals - Most Recent: 


                                Last Vital Signs











Temp  97.2 F   12/06/20 08:00


 


Pulse  72   12/06/20 08:00


 


Resp  16   12/06/20 08:00


 


BP  93/63   12/06/20 08:00


 


Pulse Ox  100   12/06/20 08:00











Weight - Most Recent: 108 lb 6.4 oz


I&O - Last 24 Hours: 


                                 Intake & Output











 12/05/20 12/06/20 12/06/20





 22:59 06:59 14:59


 


Intake Total 345 440 


 


Output Total 200 200 


 


Balance 145 240 











Lab Results Last 24 Hours: 


                         Laboratory Results - last 24 hr











  12/06/20 12/06/20 Range/Units





  06:25 06:25 


 


PT  16.1 H   (9.0-11.1)  sec


 


INR  1.53 H   (1.00-1.24)  


 


Sodium   128 L  (135-145)  mmol/L


 


Potassium   4.9  (3.5-5.3)  mmol/L


 


Chloride   92 L  (100-110)  mmol/L


 


Carbon Dioxide   24  (21-32)  mmol/L


 


BUN   52 H  (7-18)  mg/dL


 


Creatinine   2.1 H*  (0.55-1.02)  mg/dL


 


Est Cr Clr Drug Dosing   14.37  mL/min


 


Estimated GFR (MDRD)   22 L  (>60)  


 


BUN/Creatinine Ratio   24.8 H  (9-20)  


 


Glucose   197 H  ()  mg/dL


 


Calcium   8.3 L  (8.6-10.2)  mg/dL











[facility] Results Last 24 Hours: 


                                  Microbiology











 12/05/20 11:50 Influenza Type A Antigen Screen - Final





 Nasopharyngeal Swab - Nare, Unspecified    NEGATIVE INFLUENZA A VIRUS AG





    REFERENCE RANGE: NEGATIVE





 Influenza Type B Antigen Screen - Final





    NEGATIVE INFLUENZA B VIRUS AG





    REFERENCE RANGE: NEGATIVE











Med Orders - Current: 


                               Current Medications





Acetaminophen (Tylenol Extra Strength)  500 mg PO DAILY [facility]


   Last Admin: 12/05/20 09:26 Dose:  500 mg


   Documented by: 


Acetaminophen (Tylenol)  650 mg PO Q4H PRN


   PRN Reason: Pain


   Last Admin: 12/03/20 23:18 Dose:  650 mg


   Documented by: 


Acetaminophen (Tylenol Extra Strength)  1,000 mg PO BEDTIME [facility]


   Last Admin: 12/05/20 20:00 Dose:  1,000 mg


   Documented by: 


Ascorbic Acid (Vitamin C)  1,000 mg PO BEDTIME [facility]


   Last Admin: 12/05/20 20:00 Dose:  1,000 mg


   Documented by: 


Bisacodyl (Dulcolax)  5 mg PO DAILY PRN


   PRN Reason: Constipation


Cholecalciferol (Vitamin D3)  25 mcg PO BEDTIME [facility]


   Last Admin: 12/05/20 20:00 Dose:  25 mcg


   Documented by: 


Coenzyme Q10 (Coenzyme Q10)  100 mg PO QPM [facility]


   Last Admin: 12/05/20 17:08 Dose:  100 mg


   Documented by: 


Docusate Sodium (Colace)  100 mg PO BID [facility]


   Last Admin: 12/05/20 20:00 Dose:  100 mg


   Documented by: 


Furosemide (Lasix)  20 mg PO DAILY@1400 [facility]


   Last Admin: 12/05/20 15:03 Dose:  20 mg


   Documented by: 


Furosemide (Lasix)  60 mg PO DAILY [facility]


Glipizide (Glucotrol)  2.5 mg PO DAILY [facility]


   Last Admin: 12/05/20 09:24 Dose:  2.5 mg


   Documented by: 


Isosorbide Mononitrate (Imdur)  30 mg PO DAILY@1600 [facility]


   Last Admin: 12/05/20 17:08 Dose:  30 mg


   Documented by: 


Isosorbide Mononitrate (Imdur)  60 mg PO DAILY [facility]


Metoprolol Succinate (Toprol Xl)  100 mg PO BEDTIME [facility]


   Last Admin: 12/05/20 20:00 Dose:  100 mg


   Documented by: 


Multivitamins/Minerals/Vitamin C (Tab-A-Daniel)  1 tab PO DAILY [facility]


   Last Admin: 12/05/20 09:27 Dose:  1 tab


   Documented by: 


Nitroglycerin (Nitrostat)  0.4 mg SL Q5M PRN


   PRN Reason: Chest Pain


Non-Formulary Medication (Fluocinolone Acetonide [Fluocinolone Acetonide])  4 

drop EARBOTH ASDIRECTED PRN


   PRN Reason: Dizziness


Non-Formulary Medication (Peppermint Oil Cap)  1 cap PO BEDTIME [facility]


   Last Admin: 12/03/20 20:52 Dose:  Not Given


   Documented by: 


Ondansetron HCl (Zofran)  4 mg IVPUSH Q4H PRN


   PRN Reason: Nausea/Vomiting


Polyethylene Glycol (Miralax)  17 gm PO DAILY PRN


   PRN Reason: Constipation


Polyethylene Glycol (Miralax)  17 gm PO DAILY [facility]


   Last Admin: 12/05/20 09:27 Dose:  17 gm


   Documented by: 


Ranolazine (Ranexa)  1,000 mg PO BID [facility]


   Last Admin: 12/05/20 20:00 Dose:  1,000 mg


   Documented by: 


Senna/Docusate Sodium (Senna Plus)  1 tab PO BID PRN


   PRN Reason: Constipation


Sitagliptin Phosphate (Januvia)  25 mg PO DAILY [facility]


   Last Admin: 12/05/20 09:26 Dose:  25 mg


   Documented by: 


Triamcinolone Acetonide (Triamcinolone Acetonide 0.1% Crm)  0 gm TOP BID PRN


   PRN Reason: Rash


Warfarin Sodium (Coumadin Sliding Scale)  1 each PO ASDIRECTED [facility]


Warfarin Sodium (Coumadin)  6 mg PO ONETIME ONE


   Stop: 12/06/20 16:01





Discontinued Medications





Docusate Sodium (Colace)  100 mg PO BID PRN


   PRN Reason: Constipation


Furosemide (Lasix)  20 mg IVPUSH ONETIME ONE


   Stop: 12/04/20 09:01


   Last Admin: 12/04/20 09:43 Dose:  20 mg


   Documented by: 


Furosemide (Lasix)  40 mg IVPUSH DAILY [facility]


Furosemide (Lasix)  40 mg IVPUSH NOW ONE


   Stop: 12/04/20 16:19


   Last Admin: 12/04/20 16:56 Dose:  40 mg


   Documented by: 


Sodium Chloride (Normal Saline)  1,000 mls @ 50 mls/hr IV ASDIRECTED [facility]


   Last Admin: 12/05/20 09:20 Dose:  50 mls/hr


   Documented by: 


Phytonadione 5 mg/ Sodium (Chloride)  50.5 mls @ 100 mls/hr IV NOW ONE


   Stop: 12/03/20 20:17


   Last Admin: 12/03/20 20:38 Dose:  100 mls/hr


   Documented by: 


Isosorbide Mononitrate (Imdur)  90 mg PO DAILY [facility]


   Last Admin: 12/05/20 09:28 Dose:  Not Given


   Documented by: 


Non-Formulary Medication (Ranolazine [Ranolazine Er])  500 mg PO BID [facility]


   Last Admin: 12/04/20 15:53 Dose:  Not Given


   Documented by: 


Ranolazine Er 500mg  0 each PO BID [facility]


   Last Admin: 12/04/20 13:31 Dose:  2 each


   Documented by: 


Warfarin Sodium (Coumadin)  2.5 mg PO ONETIME ONE


   Stop: 12/04/20 16:01


   Last Admin: 12/04/20 16:48 Dose:  2.5 mg


   Documented by: 


Warfarin Sodium (Coumadin)  4 mg PO ONETIME ONE


   Stop: 12/05/20 16:01


   Last Admin: 12/05/20 16:55 Dose:  Not Given


   Documented by: 


Warfarin Sodium (Coumadin)  4 mg PO ONETIME ONE


   Stop: 12/05/20 16:46


   Last Admin: 12/05/20 17:09 Dose:  4 mg


   Documented by: 











- Exam


Quality Assessment: Supplemental Oxygen


General: Alert, Oriented, Cooperative, No Acute Distress


Lungs: Normal Respiratory Effort, Decreased Breath Sounds, Crackles 

(throughout).  No: Wheezing


Cardiovascular: Regular Rate, Irregular Rhythm.  No: Murmurs


GI/Abdominal Exam: Normal Bowel Sounds, Soft, Non-Tender, No Distention





Sepsis Event Note





- Evaluation


Sepsis Screening Result: No Definite Risk





- Focused Exam


Vital Signs: 


                                   Vital Signs











  Temp Temp Pulse Resp BP Pulse Ox


 


 12/06/20 08:00   97.2 F  72  16  93/63  100


 


 12/06/20 04:30  97.6 F   66  20  121/77  99


 


 12/06/20 00:00  96 F L   67  20  120/74  99














- Problem List & Annotations


(1) LFT elevation


SNOMED Code(s): 352737583, 531914821


   Code(s): R79.89 - OTHER SPECIFIED ABNORMAL FINDINGS OF BLOOD CHEMISTRY   

Status: Acute   Current Visit: Yes   Annotation/Comment:: Not repeated today, CT

showed ascites, abdominal wall edema, pulmonary edema with pleural effusions all

secondary to CHF.   





(2) Elevated brain natriuretic peptide (BNP) level


SNOMED Code(s): 579248232, 368714716


   Code(s): R79.89 - OTHER SPECIFIED ABNORMAL FINDINGS OF BLOOD CHEMISTRY   

Status: Acute   Current Visit: No   Annotation/Comment:: Sánchez placed yesterday 

for more accurate output. Stopped IV fluids overnight due to increased crackles.

Lasix 60 mg daily for this morning and 20 mg this afternoon, will monitor 

throughout day her response, and adjust as necessary.    





(3) Acute renal insufficiency


SNOMED Code(s): 972968714


   Code(s): N28.9 - DISORDER OF KIDNEY AND URETER, UNSPECIFIED   Status: Acute  

Current Visit: No   Annotation/Comment:: Cr 2.1, sodiums same. Lasix 60 mg daily

and 20 mg in afternoon. She is eating and drinking so will continue to monitor 

her I&Os and adjust accordingly. IV fluids stopped overnight to worsening fluid 

overload.     





(4) CHF exacerbation


SNOMED Code(s): 551360982, 78784696883855


   Code(s): I50.9 - HEART FAILURE, UNSPECIFIED   Status: Acute   Current Visit: 

No   


Qualifiers: 


   Heart failure type: systolic   Qualified Code(s): I50.23 - Acute on chronic 

systolic (congestive) heart failure   


Annotation/Comment:: Influenza negative. CT chest/abdomen/pelvis showed CHF with

pulmonary edema, small pleural effusion, ascites and abdominal wall edema. Covid

was negative on admission. 


EF 30% on last echo at [place] 7/17/2018, family wanted echo repeated, would 

order tomorrow as tech comes on Tuesday.   





(5) Declining functional status


SNOMED Code(s): 595844706682117


   Code(s): R53.81 - OTHER MALAISE   Status: Acute   Current Visit: No   

Annotation/Comment:: PT/OT evaluate & treat   





(6) Weakness


SNOMED Code(s): 99242235


   Code(s): R53.1 - WEAKNESS   Status: Acute   Current Visit: No   





(7) Hyponatremia


SNOMED Code(s): 38922611


   Code(s): E87.1 - HYPO-OSMOLALITY AND HYPONATREMIA   Status: Acute   Current 

Visit: No   Annotation/Comment:: Stable. Recheck tomorrow.    





(8) Ischemic cardiomyopathy


SNOMED Code(s): 300185708


   Code(s): I25.5 - ISCHEMIC CARDIOMYOPATHY   Status: Chronic   Current Visit: 

No   





(9) Palliative care status


SNOMED Code(s): 176210284


   Code(s): Z51.5 - ENCOUNTER FOR PALLIATIVE CARE   Status: Chronic   Current 

Visit: No   





(10) Afib


SNOMED Code(s): 66710914


   Code(s): I48.91 - UNSPECIFIED ATRIAL FIBRILLATION   Status: Chronic   Current

Visit: No   


Qualifiers: 


   Atrial fibrillation type: longstanding persistent   Qualified Code(s): I48.11

- Longstanding persistent atrial fibrillation   


Annotation/Comment:: INR 1.53, Coumadin per pharmacy, had received Vitamin K on 

admission. Coumadin dose yesterday was 4 mg.    





(11) Chronic systolic congestive heart failure, NYHA class 3


SNOMED Code(s): 166977118, 559346911, 967436759


   Code(s): I50.22 - CHRONIC SYSTOLIC (CONGESTIVE) HEART FAILURE   Status: 

Chronic   Current Visit: No   





(12) Constipation


SNOMED Code(s): 72351146


   Code(s): K59.00 - CONSTIPATION, UNSPECIFIED   Status: Chronic   Current 

Visit: Yes   Annotation/Comment:: acute on chronic. Had bowel movement today, 

bowel sounds active.    





(13) Parkinson disease


SNOMED Code(s): 37252365


   Code(s): G20 - PARKINSON'S DISEASE   Status: Chronic   Current Visit: No   





(14) Chronic stable angina


SNOMED Code(s): 971877375


   Code(s): I20.8 - OTHER FORMS OF ANGINA PECTORIS   Status: Chronic   Current 

Visit: Yes   





(15) Diabetes mellitus type 2, diet-controlled


SNOMED Code(s): 640193999262308, 313005616418899


   Code(s): E11.9 - TYPE 2 DIABETES MELLITUS WITHOUT COMPLICATIONS   Status: C

hronic   Current Visit: No   





(16) Hyperkalemia


SNOMED Code(s): 68930376


   Code(s): E87.5 - HYPERKALEMIA   Status: Resolved   Current Visit: Yes   





(17) Elevated INR


SNOMED Code(s): 304654052


   Code(s): R79.1 - ABNORMAL COAGULATION PROFILE   Status: Resolved   Current 

Visit: No   Annotation/Comment::     





- Problem List Review


Problem List Initiated/Reviewed/Updated: Yes





- My Orders


Last 24 Hours: 


My Active Orders





12/05/20 08:55


Chest Abdomen Pelvis wo Cont [CT] Routine 





12/05/20 14:00


Furosemide [Lasix]   20 mg PO DAILY@1400 





12/05/20 14:35


Urinary Catheter Assessment [RC] QSHIFT 





12/06/20 08:50


Convert IV to Peripheral Lock [Convert IV to Saline Lock] [OM.PC] Routine 





12/06/20 09:00


Furosemide [Lasix]   60 mg PO DAILY 


Isosorbide Mononitrate [Imdur]   60 mg PO DAILY 





12/06/20 14:45


Insert Sánchez Catheter [Insert Urinary Catheter] [OM.PC] Q24H 





12/06/20 16:00


Warfarin [Coumadin]   6 mg PO ONETIME ONE 





12/07/20 06:00


BASIC METABOLIC PANEL,BMP [CHEM] Routine 





12/07/20 09:50


INR,PT,PROTHROMBIN TIME [COAG] DAILY 





12/08/20 09:50


INR,PT,PROTHROMBIN TIME [COAG] DAILY 














- Plan


Plan:: 





1. CHF: Lasix 60 mg in am and 20 mg in afternoon. Influenza negative. CT as 

above. 


2. GER: Improved to 2.1. Drinking, no fluid restrictions at this time. Continue 

to diuresis for her CHF. 


3. LFTs: ascites and abdominal wall edema secondary to CHF. 


4. Discussed with family her condition after CT results came back yesterday 

showing heart failure, they want to continue treating her heart failure and try 

to correct her kidney function. Discussed hospice care/end of life care. They 

may have family that would come to stay with her so she could have family visit 

her at her apartment and go home with hospice vs changing her to comfort 

measures here, moving her to swing bed until a nursing home bed opened up for 

end of life care. Did advised that if she is made comfort measures/end of life 

care that only one person may come in and rest of family would have to visit 

through window, phone or video call per infection control policy during 

pandemic. Reiterated that death was not imminent and family did not all have to 

come home at this point, may take days to weeks. Also advised that if she would 

have a heart attack we may not have any warning to let them know in time to see 

her. Will keep them posted as to her condition and if feel she would not make it

 in next 24 hours would have one family member of their choosing come in to sit 

with her but stressed that only 1 family member may come in period, NOT multiple

 people one at a time.

## 2020-12-07 RX ADMIN — Medication PRN ML: at 09:08

## 2020-12-07 RX ADMIN — VITAMIN D, TAB 1000IU (100/BT) SCH MCG: 25 TAB at 20:48

## 2020-12-07 RX ADMIN — METOPROLOL SUCCINATE SCH: 100 TABLET, FILM COATED, EXTENDED RELEASE ORAL at 20:36

## 2020-12-07 RX ADMIN — Medication PRN ML: at 14:25

## 2020-12-07 RX ADMIN — VITAMIN D, TAB 1000IU (100/BT) SCH: 25 TAB at 21:43

## 2020-12-07 NOTE — PCM.PN
- General Info


Date of Service: 12/07/20


Subjective Update: 





Sylwia is sitting up this morning, states wants to get out of here. Wants some

water. Had 950 ml oral yesterday and 485 ml output yesterday even with Lasix. 

Her lungs sounds have improved overnight per nursing. Roland was placed over the 

weekend to monitor her output closely. Family initially wanted to take home to 

start hospice care but when Patti Casillas, Discharge planning RN went over all 

the cares they didn't think they would be able to do them. Application to [facility] has been sent as  [facility] is not taking any new patients at this 

time. Blood pressures are stable. 





- Patient Data


Vitals - Most Recent: 


                                Last Vital Signs











Temp  96.5 F L  12/07/20 04:30


 


Pulse  66   12/07/20 04:30


 


Resp  16   12/07/20 04:30


 


BP  114/70   12/07/20 09:04


 


Pulse Ox  100   12/07/20 04:30











Weight - Most Recent: 181 lb


I&O - Last 24 Hours: 


                                 Intake & Output











 12/06/20 12/07/20 12/07/20





 22:59 06:59 14:59


 


Intake Total 250 200 30


 


Output Total 210 150 


 


Balance 40 50 30











Lab Results Last 24 Hours: 


                         Laboratory Results - last 24 hr











  12/07/20 12/07/20 Range/Units





  06:30 06:30 


 


PT  21.5 H   (9.0-11.1)  sec


 


INR  2.09 H   (1.00-1.24)  


 


Sodium   130 L  (135-145)  mmol/L


 


Potassium   5.1  (3.5-5.3)  mmol/L


 


Chloride   92 L  (100-110)  mmol/L


 


Carbon Dioxide   25  (21-32)  mmol/L


 


BUN   59 H  (7-18)  mg/dL


 


Creatinine   2.7 H*  (0.55-1.02)  mg/dL


 


Est Cr Clr Drug Dosing   12.44  mL/min


 


Estimated GFR (MDRD)   17 L  (>60)  


 


BUN/Creatinine Ratio   21.9 H  (9-20)  


 


Glucose   278 H D  ()  mg/dL


 


Calcium   8.3 L  (8.6-10.2)  mg/dL











Med Orders - Current: 


                               Current Medications





Acetaminophen (Tylenol Extra Strength)  500 mg PO DAILY [facility]


   Last Admin: 12/06/20 09:40 Dose:  500 mg


   Documented by: 


Acetaminophen (Tylenol)  650 mg PO Q4H PRN


   PRN Reason: Pain


   Last Admin: 12/03/20 23:18 Dose:  650 mg


   Documented by: 


Acetaminophen (Tylenol Extra Strength)  1,000 mg PO BEDTIME [facility]


   Last Admin: 12/06/20 20:05 Dose:  1,000 mg


   Documented by: 


Ascorbic Acid (Vitamin C)  1,000 mg PO BEDTIME [facility]


   Last Admin: 12/06/20 20:05 Dose:  1,000 mg


   Documented by: 


Bisacodyl (Dulcolax)  5 mg PO DAILY PRN


   PRN Reason: Constipation


Cholecalciferol (Vitamin D3)  25 mcg PO BEDTIME [facility]


   Last Admin: 12/06/20 20:05 Dose:  25 mcg


   Documented by: 


Coenzyme Q10 (Coenzyme Q10)  100 mg PO QPM [facility]


   Last Admin: 12/06/20 16:47 Dose:  100 mg


   Documented by: 


Docusate Sodium (Colace)  100 mg PO BID [facility]


   Last Admin: 12/06/20 20:06 Dose:  100 mg


   Documented by: 


Furosemide (Lasix)  20 mg IVPUSH DAILY@1400 [facility]


   Last Admin: 12/06/20 14:22 Dose:  20 mg


   Documented by: 


Furosemide (Lasix)  40 mg IVPUSH DAILY [facility]


   Last Admin: 12/07/20 09:08 Dose:  40 mg


   Documented by: 


Glipizide (Glucotrol)  2.5 mg PO DAILY [facility]


   Last Admin: 12/07/20 09:06 Dose:  2.5 mg


   Documented by: 


Isosorbide Mononitrate (Imdur)  30 mg PO DAILY@1600 [facility]


   Last Admin: 12/06/20 20:04 Dose:  30 mg


   Documented by: 


Isosorbide Mononitrate (Imdur)  60 mg PO DAILY [facility]


   Last Admin: 12/07/20 09:04 Dose:  60 mg


   Documented by: 


Metoprolol Succinate (Toprol Xl)  100 mg PO BEDTIME [facility]


   Last Admin: 12/06/20 20:05 Dose:  100 mg


   Documented by: 


Multivitamins/Minerals/Vitamin C (Tab-A-Daniel)  1 tab PO DAILY [facility]


   Last Admin: 12/07/20 09:04 Dose:  1 tab


   Documented by: 


Nitroglycerin (Nitrostat)  0.4 mg SL Q5M PRN


   PRN Reason: Chest Pain


Non-Formulary Medication (Fluocinolone Acetonide [Fluocinolone Acetonide])  4 

drop EARBOTH ASDIRECTED PRN


   PRN Reason: Dizziness


Non-Formulary Medication (Peppermint Oil Cap)  1 cap PO BEDTIME [facility]


   Last Admin: 12/03/20 20:52 Dose:  Not Given


   Documented by: 


Ondansetron HCl (Zofran)  4 mg IVPUSH Q4H PRN


   PRN Reason: Nausea/Vomiting


Polyethylene Glycol (Miralax)  17 gm PO DAILY PRN


   PRN Reason: Constipation


Polyethylene Glycol (Miralax)  17 gm PO DAILY [facility]


   Last Admin: 12/07/20 09:02 Dose:  17 gm


   Documented by: 


Ranolazine (Ranexa)  1,000 mg PO BID [facility]


   Last Admin: 12/07/20 09:07 Dose:  1,000 mg


   Documented by: 


Senna/Docusate Sodium (Senna Plus)  1 tab PO BID PRN


   PRN Reason: Constipation


Sitagliptin Phosphate (Januvia)  25 mg PO DAILY [facility]


   Last Admin: 12/07/20 09:05 Dose:  25 mg


   Documented by: 


Sodium Chloride (Saline Flush)  10 ml FLUSH ASDIRECTED PRN


   PRN Reason: flush med


Triamcinolone Acetonide (Triamcinolone Acetonide 0.1% Crm)  0 gm TOP BID PRN


   PRN Reason: Rash


Warfarin Sodium (Coumadin Sliding Scale)  1 each PO ASDIRECTED [facility]





Discontinued Medications





Docusate Sodium (Colace)  100 mg PO BID PRN


   PRN Reason: Constipation


Furosemide (Lasix)  20 mg IVPUSH ONETIME ONE


   Stop: 12/04/20 09:01


   Last Admin: 12/04/20 09:43 Dose:  20 mg


   Documented by: 


Furosemide (Lasix)  40 mg IVPUSH DAILY [facility]


Furosemide (Lasix)  40 mg IVPUSH NOW ONE


   Stop: 12/04/20 16:19


   Last Admin: 12/04/20 16:56 Dose:  40 mg


   Documented by: 


Furosemide (Lasix)  20 mg PO DAILY@1400 [facility]


   Last Admin: 12/05/20 15:03 Dose:  20 mg


   Documented by: 


Furosemide (Lasix)  60 mg PO DAILY [facility]


   Last Admin: 12/06/20 09:39 Dose:  60 mg


   Documented by: 


Furosemide (Lasix)  60 mg IVPUSH NOW ONE


   Stop: 12/06/20 16:53


   Last Admin: 12/06/20 16:57 Dose:  60 mg


   Documented by: 


Sodium Chloride (Normal Saline)  1,000 mls @ 50 mls/hr IV ASDIRECTED [facility]


   Last Admin: 12/05/20 09:20 Dose:  50 mls/hr


   Documented by: 


Phytonadione 5 mg/ Sodium (Chloride)  50.5 mls @ 100 mls/hr IV NOW ONE


   Stop: 12/03/20 20:17


   Last Admin: 12/03/20 20:38 Dose:  100 mls/hr


   Documented by: 


Isosorbide Mononitrate (Imdur)  90 mg PO DAILY [facility]


   Last Admin: 12/05/20 09:28 Dose:  Not Given


   Documented by: 


Non-Formulary Medication (Ranolazine [Ranolazine Er])  500 mg PO BID [facility]


   Last Admin: 12/04/20 15:53 Dose:  Not Given


   Documented by: 


Ranolazine Er 500mg  0 each PO BID [facility]


   Last Admin: 12/04/20 13:31 Dose:  2 each


   Documented by: 


Warfarin Sodium (Coumadin)  2.5 mg PO ONETIME ONE


   Stop: 12/04/20 16:01


   Last Admin: 12/04/20 16:48 Dose:  2.5 mg


   Documented by: 


Warfarin Sodium (Coumadin)  4 mg PO ONETIME ONE


   Stop: 12/05/20 16:01


   Last Admin: 12/05/20 16:55 Dose:  Not Given


   Documented by: 


Warfarin Sodium (Coumadin)  4 mg PO ONETIME ONE


   Stop: 12/05/20 16:46


   Last Admin: 12/05/20 17:09 Dose:  4 mg


   Documented by: 


Warfarin Sodium 1 mg/ Warfarin (Sodium 5 mg)  6 mg PO ONETIME ONE


   Stop: 12/06/20 17:01


   Last Admin: 12/06/20 16:54 Dose:  6 mg


   Documented by: 


Warfarin Sodium (Coumadin) Confirm Administered Dose 1 mg .ROUTE .STK-MED ONE


   Stop: 12/06/20 16:50


   Last Admin: 12/06/20 16:57 Dose:  Not Given


   Documented by: 


Warfarin Sodium (Coumadin) Confirm Administered Dose 5 mg .ROUTE .STK-MED ONE


   Stop: 12/06/20 16:50


   Last Admin: 12/06/20 16:57 Dose:  Not Given


   Documented by: 











- Exam


General: Alert, Oriented, Cooperative, No Acute Distress


Lungs: Clear to Auscultation (BUL), Normal Respiratory Effort, Decreased Breath 

Sounds, Crackles (very fine in bibasilar).  No: Wheezing


Cardiovascular: Regular Rate, Irregular Rhythm


GI/Abdominal Exam: Soft, Non-Tender, No Distention (hypoactive to normal x 4), 

Abnormal Bowel Sounds


 (Female) Exam: Deferred


Extremities: Pallor





Sepsis Event Note





- Evaluation


Sepsis Screening Result: No Definite Risk





- Focused Exam


Vital Signs: 


                                   Vital Signs











  Temp Temp Pulse Resp BP BP Pulse Ox


 


 12/07/20 09:04      114/70  


 


 12/07/20 04:30  96.5 F L   66  16   103/67  100


 


 12/07/20 01:20    67  18   125/71  100


 


 12/07/20 00:00       


 


 12/06/20 23:48   97.4 F  64  18   112/64  99














  Pulse Ox


 


 12/07/20 09:04 


 


 12/07/20 04:30 


 


 12/07/20 01:20 


 


 12/07/20 00:00  99


 


 12/06/20 23:48 














- Problem List & Annotations


(1) CHF exacerbation


SNOMED Code(s): 218929456, 28530545676608


   Code(s): I50.9 - HEART FAILURE, UNSPECIFIED   Status: Acute   Current Visit: 

No   


Qualifiers: 


   Heart failure type: systolic   Qualified Code(s): I50.23 - Acute on chronic 

systolic (congestive) heart failure   


Annotation/Comment:: Influenza negative. CT chest/abdomen/pelvis showed CHF with

pulmonary edema, small pleural effusion, ascites and abdominal wall edema. Covid

was negative on admission. 


EF 30% on last echo at [place] 7/17/2018, will not repeat echo as that would not

change treatment plan.   





(2) Elevated brain natriuretic peptide (BNP) level


SNOMED Code(s): 491961858, 670526718


   Code(s): R79.89 - OTHER SPECIFIED ABNORMAL FINDINGS OF BLOOD CHEMISTRY   

Status: Acute   Current Visit: No   Annotation/Comment:: Lasix 40 mg IV this 

morning, 20 mg IV this afternoon. Will change to oral once we know more 

definitive discharge.    





(3) LFT elevation


SNOMED Code(s): 243855836, 875471603


   Code(s): R79.89 - OTHER SPECIFIED ABNORMAL FINDINGS OF BLOOD CHEMISTRY   

Status: Acute   Current Visit: Yes   Annotation/Comment:: Not repeated since CT 

showed ascites, abdominal wall edema, pulmonary edema with pleural effusions all

secondary to CHF.   





(4) Acute renal insufficiency


SNOMED Code(s): 368166010


   Code(s): N28.9 - DISORDER OF KIDNEY AND URETER, UNSPECIFIED   Status: Acute  

Current Visit: No   Annotation/Comment:: Cr 2.7 today, Sodium improved to 130. 

Lasix 40 mg daily and 20 mg in afternoon. She is eating and drinking so will 

continue to monitor her I&Os and adjust accordingly.    





(5) Declining functional status


SNOMED Code(s): 193181510773722


   Code(s): R53.81 - OTHER MALAISE   Status: Acute   Current Visit: No   

Annotation/Comment:: will be going to [facility] for end of life care so 

PT/OT no longer needed.   





(6) Weakness


SNOMED Code(s): 62624868


   Code(s): R53.1 - WEAKNESS   Status: Acute   Current Visit: No   





(7) Hyponatremia


SNOMED Code(s): 94651796


   Code(s): E87.1 - HYPO-OSMOLALITY AND HYPONATREMIA   Status: Acute   Current 

Visit: No   Annotation/Comment:: sodium 130.   





(8) Ischemic cardiomyopathy


SNOMED Code(s): 720388393


   Code(s): I25.5 - ISCHEMIC CARDIOMYOPATHY   Status: Chronic   Current Visit: 

No   





(9) Palliative care status


SNOMED Code(s): 160149369


   Code(s): Z51.5 - ENCOUNTER FOR PALLIATIVE CARE   Status: Chronic   Current 

Visit: No   





(10) Afib


SNOMED Code(s): 23906828


   Code(s): I48.91 - UNSPECIFIED ATRIAL FIBRILLATION   Status: Chronic   Current

Visit: No   


Qualifiers: 


   Atrial fibrillation type: longstanding persistent   Qualified Code(s): I48.11

- Longstanding persistent atrial fibrillation   


Annotation/Comment:: INR 1.53, Coumadin per pharmacy, had received Vitamin K on 

admission. Coumadin dose yesterday was 4 mg.    





(11) Chronic systolic congestive heart failure, NYHA class 3


SNOMED Code(s): 111376016, 907072319, 061582751


   Code(s): I50.22 - CHRONIC SYSTOLIC (CONGESTIVE) HEART FAILURE   Status: 

Chronic   Current Visit: No   





(12) Constipation


SNOMED Code(s): 07917799


   Code(s): K59.00 - CONSTIPATION, UNSPECIFIED   Status: Chronic   Current 

Visit: Yes   Annotation/Comment:: acute on chronic. Had bowel movement today, 

bowel sounds active.    





(13) Parkinson disease


SNOMED Code(s): 77467917


   Code(s): G20 - PARKINSON'S DISEASE   Status: Chronic   Current Visit: No   





(14) Chronic stable angina


SNOMED Code(s): 223821747


   Code(s): I20.8 - OTHER FORMS OF ANGINA PECTORIS   Status: Chronic   Current 

Visit: Yes   





(15) Diabetes mellitus type 2, diet-controlled


SNOMED Code(s): 652425262817527, 430300015409954


   Code(s): E11.9 - TYPE 2 DIABETES MELLITUS WITHOUT COMPLICATIONS   Status: 

Chronic   Current Visit: No   





(16) Hyperkalemia


SNOMED Code(s): 08797539


   Code(s): E87.5 - HYPERKALEMIA   Status: Resolved   Current Visit: Yes   





(17) Elevated INR


SNOMED Code(s): 802497769


   Code(s): R79.1 - ABNORMAL COAGULATION PROFILE   Status: Resolved   Current 

Visit: No   Annotation/Comment::     





- Problem List Review


Problem List Initiated/Reviewed/Updated: Yes





- My Orders


Last 24 Hours: 


My Active Orders





12/06/20 08:50


Convert IV to Peripheral Lock [Convert IV to Saline Lock] [OM.PC] Routine 





12/06/20 09:00


Isosorbide Mononitrate [Imdur]   60 mg PO DAILY 





12/06/20 14:00


Furosemide [Lasix]   20 mg IVPUSH DAILY@1400 





12/07/20 09:00


Furosemide [Lasix]   40 mg IVPUSH DAILY 





12/07/20 09:08


Sodium Chloride 0.9% [Saline Flush]   10 ml FLUSH ASDIRECTED PRN 





12/07/20 14:45


Insert Roland Catheter [Insert Urinary Catheter] [OM.PC] Q24H 





12/08/20 09:50


INR,PT,PROTHROMBIN TIME [COAG] DAILY 














- Plan


Plan:: 





1. CHF: Lasix 40 mg in am and 20 mg in afternoon. 


2. GER: Worse at 2.7. Drinking, no fluid restrictions at this time. Continue to 

diuresis for her CHF. 


3. Discharge: [facility] referral made, may take her tomorrow, would need 

to repeat Covid test in am.

## 2020-12-08 RX ADMIN — METOPROLOL SUCCINATE SCH: 100 TABLET, FILM COATED, EXTENDED RELEASE ORAL at 20:46

## 2020-12-08 RX ADMIN — ISOSORBIDE MONONITRATE SCH: 60 TABLET, FILM COATED, EXTENDED RELEASE ORAL at 14:57

## 2020-12-08 NOTE — PCM.PN
- General Info


Date of Service: 12/08/20


Subjective Update: 





Sywlia has been sleeping more but when she is awake she is asking for water 

and something to eat. Output yesterday was 465 ml, had 150 ml out overnight. Her

son asked for her vitamins to be stopped. She was taken off oxygen yesterday as 

she was 100% on 2L, desaturated to 91% overnight so was placed back on, she's 

100% again this morning so removed. No pain this morning. 





- Patient Data


Vitals - Most Recent: 


                                Last Vital Signs











Temp  97.4 F   12/08/20 07:45


 


Pulse  62   12/08/20 07:45


 


Resp  18   12/08/20 07:45


 


BP  118/60   12/08/20 07:45


 


Pulse Ox  87 L  12/08/20 07:45











Weight - Most Recent: 181 lb


I&O - Last 24 Hours: 


                                 Intake & Output











 12/07/20 12/08/20 12/08/20





 22:59 06:59 14:59


 


Intake Total 50 40 


 


Output Total 75 75 


 


Balance -25 -35 











Lab Results Last 24 Hours: 


                         Laboratory Results - last 24 hr











  12/08/20 12/08/20 Range/Units





  06:15 07:56 


 


PT  34.3 H   (9.0-11.1)  sec


 


INR  3.45 H   (1.00-1.24)  


 


SARS-CoV-2 RNA (TOD)   Negative  (NEGATIVE)  











Med Orders - Current: 


                               Current Medications





Acetaminophen (Tylenol Extra Strength)  500 mg PO DAILY [facility]


   Last Admin: 12/07/20 09:16 Dose:  500 mg


   Documented by: 


Acetaminophen (Tylenol)  650 mg PO Q4H PRN


   PRN Reason: Pain


   Last Admin: 12/03/20 23:18 Dose:  650 mg


   Documented by: 


Acetaminophen (Tylenol Extra Strength)  1,000 mg PO BEDTIME [facility]


   Last Admin: 12/07/20 21:42 Dose:  Not Given


   Documented by: 


Bisacodyl (Dulcolax)  5 mg PO DAILY PRN


   PRN Reason: Constipation


Docusate Sodium (Colace)  100 mg PO BID [facility]


   Last Admin: 12/07/20 21:41 Dose:  Not Given


   Documented by: 


Furosemide (Lasix)  40 mg PO DAILY [facility]


Furosemide (Lasix)  20 mg PO DAILY@1400 [facility]


Glipizide (Glucotrol)  2.5 mg PO DAILY [facility]


   Last Admin: 12/07/20 09:06 Dose:  2.5 mg


   Documented by: 


Isosorbide Mononitrate (Imdur)  30 mg PO DAILY@1600 [facility]


   Last Admin: 12/07/20 16:13 Dose:  30 mg


   Documented by: 


Isosorbide Mononitrate (Imdur)  60 mg PO DAILY [facility]


Metoprolol Succinate (Toprol Xl)  100 mg PO BEDTIME [facility]


   Last Admin: 12/07/20 20:36 Dose:  Not Given


   Documented by: 


Nitroglycerin (Nitrostat)  0.4 mg SL Q5M PRN


   PRN Reason: Chest Pain


Non-Formulary Medication (Fluocinolone Acetonide [Fluocinolone Acetonide])  4 

drop EARBOTH ASDIRECTED PRN


   PRN Reason: Dizziness


Ondansetron HCl (Zofran)  4 mg IVPUSH Q4H PRN


   PRN Reason: Nausea/Vomiting


Polyethylene Glycol (Miralax)  17 gm PO DAILY PRN


   PRN Reason: Constipation


Polyethylene Glycol (Miralax)  17 gm PO DAILY [facility]


   Last Admin: 12/07/20 09:02 Dose:  17 gm


   Documented by: 


Ranolazine (Ranexa)  1,000 mg PO BID [facility]


   Last Admin: 12/07/20 21:41 Dose:  Not Given


   Documented by: 


Senna/Docusate Sodium (Senna Plus)  1 tab PO BID PRN


   PRN Reason: Constipation


Sitagliptin Phosphate (Januvia)  25 mg PO DAILY [facility]


   Last Admin: 12/07/20 09:05 Dose:  25 mg


   Documented by: 


Sodium Chloride (Saline Flush)  10 ml FLUSH ASDIRECTED PRN


   PRN Reason: flush med


   Last Admin: 12/07/20 14:25 Dose:  10 ml


   Documented by: 


Triamcinolone Acetonide (Triamcinolone Acetonide 0.1% Crm)  0 gm TOP BID PRN


   PRN Reason: Rash


Warfarin Sodium (Coumadin Sliding Scale)  1 each PO ASDIRECTED [facility]





Discontinued Medications





Ascorbic Acid (Vitamin C)  1,000 mg PO BEDTIME [facility]


   Last Admin: 12/07/20 21:42 Dose:  Not Given


   Documented by: 


Cholecalciferol (Vitamin D3)  25 mcg PO BEDTIME [facility]


   Last Admin: 12/07/20 21:43 Dose:  Not Given


   Documented by: 


Coenzyme Q10 (Coenzyme Q10)  100 mg PO QPM [facility]


   Last Admin: 12/07/20 16:13 Dose:  100 mg


   Documented by: 


Docusate Sodium (Colace)  100 mg PO BID PRN


   PRN Reason: Constipation


Furosemide (Lasix)  20 mg IVPUSH ONETIME ONE


   Stop: 12/04/20 09:01


   Last Admin: 12/04/20 09:43 Dose:  20 mg


   Documented by: 


Furosemide (Lasix)  40 mg IVPUSH DAILY [facility]


Furosemide (Lasix)  40 mg IVPUSH NOW ONE


   Stop: 12/04/20 16:19


   Last Admin: 12/04/20 16:56 Dose:  40 mg


   Documented by: 


Furosemide (Lasix)  20 mg PO DAILY@1400 [facility]


   Last Admin: 12/05/20 15:03 Dose:  20 mg


   Documented by: 


Furosemide (Lasix)  60 mg PO DAILY [facility]


   Last Admin: 12/06/20 09:39 Dose:  60 mg


   Documented by: 


Furosemide (Lasix)  20 mg IVPUSH DAILY@1400 [facility]


   Last Admin: 12/07/20 14:25 Dose:  20 mg


   Documented by: 


Furosemide (Lasix)  40 mg IVPUSH DAILY [facility]


   Last Admin: 12/07/20 09:08 Dose:  40 mg


   Documented by: 


Furosemide (Lasix)  60 mg IVPUSH NOW ONE


   Stop: 12/06/20 16:53


   Last Admin: 12/06/20 16:57 Dose:  60 mg


   Documented by: 


Sodium Chloride (Normal Saline)  1,000 mls @ 50 mls/hr IV ASDIRECTED [facility]


   Last Admin: 12/05/20 09:20 Dose:  50 mls/hr


   Documented by: 


Phytonadione 5 mg/ Sodium (Chloride)  50.5 mls @ 100 mls/hr IV NOW ONE


   Stop: 12/03/20 20:17


   Last Admin: 12/03/20 20:38 Dose:  100 mls/hr


   Documented by: 


Isosorbide Mononitrate (Imdur)  90 mg PO DAILY [facility]


   Last Admin: 12/05/20 09:28 Dose:  Not Given


   Documented by: 


Isosorbide Mononitrate (Imdur)  60 mg PO DAILY [facility]


   Last Admin: 12/07/20 09:04 Dose:  60 mg


   Documented by: 


Multivitamins/Minerals/Vitamin C (Tab-A-Daniel)  1 tab PO DAILY [facility]


   Last Admin: 12/07/20 09:04 Dose:  1 tab


   Documented by: 


Non-Formulary Medication (Peppermint Oil Cap)  1 cap PO BEDTIME [facility]


   Last Admin: 12/03/20 20:52 Dose:  Not Given


   Documented by: 


Non-Formulary Medication (Ranolazine [Ranolazine Er])  500 mg PO BID [facility]


   Last Admin: 12/04/20 15:53 Dose:  Not Given


   Documented by: 


Ranolazine Er 500mg  0 each PO BID [facility]


   Last Admin: 12/04/20 13:31 Dose:  2 each


   Documented by: 


Warfarin Sodium (Coumadin)  2.5 mg PO ONETIME ONE


   Stop: 12/04/20 16:01


   Last Admin: 12/04/20 16:48 Dose:  2.5 mg


   Documented by: 


Warfarin Sodium (Coumadin)  4 mg PO ONETIME ONE


   Stop: 12/05/20 16:01


   Last Admin: 12/05/20 16:55 Dose:  Not Given


   Documented by: 


Warfarin Sodium (Coumadin)  4 mg PO ONETIME ONE


   Stop: 12/05/20 16:46


   Last Admin: 12/05/20 17:09 Dose:  4 mg


   Documented by: 


Warfarin Sodium 1 mg/ Warfarin (Sodium 5 mg)  6 mg PO ONETIME ONE


   Stop: 12/06/20 17:01


   Last Admin: 12/06/20 16:54 Dose:  6 mg


   Documented by: 


Warfarin Sodium (Coumadin) Confirm Administered Dose 1 mg .ROUTE .STK-MED ONE


   Stop: 12/06/20 16:50


   Last Admin: 12/06/20 16:57 Dose:  Not Given


   Documented by: 


Warfarin Sodium (Coumadin) Confirm Administered Dose 5 mg .ROUTE .STK-MED ONE


   Stop: 12/06/20 16:50


   Last Admin: 12/06/20 16:57 Dose:  Not Given


   Documented by: 


Warfarin Sodium (Coumadin)  2.5 mg PO ONETIME ONE


   Stop: 12/07/20 16:01


   Last Admin: 12/07/20 16:12 Dose:  2.5 mg


   Documented by: 











- Exam


General: Alert, Oriented (person), Cooperative, No Acute Distress


Lungs: Clear to Auscultation, Normal Respiratory Effort, Decreased Breath 

Sounds, Crackles (fine bibasilar).  No: Wheezing


Cardiovascular: Regular Rate, Irregular Rhythm


GI/Abdominal Exam: Soft, Non-Tender, No Distention, Abnormal Bowel Sounds





Sepsis Event Note





- Evaluation


Sepsis Screening Result: No Definite Risk





- Focused Exam


Vital Signs: 


                                   Vital Signs











  Temp Pulse Resp BP Pulse Ox


 


 12/08/20 07:45  97.4 F  62  18  118/60  87 L


 


 12/08/20 00:00  97 F  65  22 H  123/83  99














- Problem List & Annotations


(1) CHF exacerbation


SNOMED Code(s): 956574933, 17462249858727


   Code(s): I50.9 - HEART FAILURE, UNSPECIFIED   Status: Acute   Current Visit: 

No   


Qualifiers: 


   Heart failure type: systolic   Qualified Code(s): I50.23 - Acute on chronic 

systolic (congestive) heart failure   


Annotation/Comment:: Covid was negative on admission and negative again today.


EF 30% on last echo at [place] 7/17/2018, will not repeat echo as that would not

change treatment plan.   





(2) Elevated brain natriuretic peptide (BNP) level


SNOMED Code(s): 211122835, 428522571


   Code(s): R79.89 - OTHER SPECIFIED ABNORMAL FINDINGS OF BLOOD CHEMISTRY   

Status: Acute   Current Visit: No   Annotation/Comment:: Lasix 40 mg po this 

morning, 20 mg po this afternoon. Discharge tomorrow to [facility] will be 

admitted to Hospice there.   





(3) LFT elevation


SNOMED Code(s): 538605450, 612250711


   Code(s): R79.89 - OTHER SPECIFIED ABNORMAL FINDINGS OF BLOOD CHEMISTRY   

Status: Acute   Current Visit: Yes   





(4) Acute renal insufficiency


SNOMED Code(s): 560333662


   Code(s): N28.9 - DISORDER OF KIDNEY AND URETER, UNSPECIFIED   Status: Acute  

Current Visit: No   





(5) Declining functional status


SNOMED Code(s): 553384010819211


   Code(s): R53.81 - OTHER MALAISE   Status: Acute   Current Visit: No   

Annotation/Comment:: will be going to [facility] for end of life care so 

PT/OT no longer needed.   





(6) Weakness


SNOMED Code(s): 83517321


   Code(s): R53.1 - WEAKNESS   Status: Acute   Current Visit: No   





(7) Hyponatremia


SNOMED Code(s): 98965203


   Code(s): E87.1 - HYPO-OSMOLALITY AND HYPONATREMIA   Status: Acute   Current 

Visit: No   





(8) Ischemic cardiomyopathy


SNOMED Code(s): 090809148


   Code(s): I25.5 - ISCHEMIC CARDIOMYOPATHY   Status: Chronic   Current Visit: 

No   





(9) Palliative care status


SNOMED Code(s): 209871872


   Code(s): Z51.5 - ENCOUNTER FOR PALLIATIVE CARE   Status: Chronic   Current 

Visit: No   





(10) Afib


SNOMED Code(s): 10940420


   Code(s): I48.91 - UNSPECIFIED ATRIAL FIBRILLATION   Status: Chronic   Current

Visit: No   


Qualifiers: 


   Atrial fibrillation type: longstanding persistent   Qualified Code(s): I48.11

- Longstanding persistent atrial fibrillation   


Annotation/Comment:: INR 2.65, Coumadin per pharmacy, hold dose today.   





(11) Chronic systolic congestive heart failure, NYHA class 3


SNOMED Code(s): 422736685, 927422579, 371714398


   Code(s): I50.22 - CHRONIC SYSTOLIC (CONGESTIVE) HEART FAILURE   Status: 

Chronic   Current Visit: No   





(12) Constipation


SNOMED Code(s): 55585908


   Code(s): K59.00 - CONSTIPATION, UNSPECIFIED   Status: Chronic   Current 

Visit: Yes   





(13) Parkinson disease


SNOMED Code(s): 90753211


   Code(s): G20 - PARKINSON'S DISEASE   Status: Chronic   Current Visit: No   





(14) Chronic stable angina


SNOMED Code(s): 838804200


   Code(s): I20.8 - OTHER FORMS OF ANGINA PECTORIS   Status: Chronic   Current 

Visit: Yes   





(15) Diabetes mellitus type 2, diet-controlled


SNOMED Code(s): 608368599274430, 973899914389011


   Code(s): E11.9 - TYPE 2 DIABETES MELLITUS WITHOUT COMPLICATIONS   Status: 

Chronic   Current Visit: No   





(16) Hyperkalemia


SNOMED Code(s): 61766394


   Code(s): E87.5 - HYPERKALEMIA   Status: Resolved   Current Visit: Yes   





(17) Elevated INR


SNOMED Code(s): 210282925


   Code(s): R79.1 - ABNORMAL COAGULATION PROFILE   Status: Resolved   Current 

Visit: No   Annotation/Comment::     





- Problem List Review


Problem List Initiated/Reviewed/Updated: Yes





- My Orders


Last 24 Hours: 


My Active Orders





12/07/20 Lunch


Regular Diet [DIET] 





12/07/20 11:00


Vital Signs [RC] 08,16,00 





12/07/20 14:45


Insert Roland Catheter [Insert Urinary Catheter] [OM.PC] Q24H 





12/08/20 09:00


Furosemide [Lasix]   40 mg PO DAILY 


Isosorbide Mononitrate [Imdur]   60 mg PO DAILY 





12/08/20 14:00


Furosemide [Lasix]   20 mg PO DAILY@1400 














- Plan


Plan:: 





1. CHF: Lasix 40 mg in am and 20 mg in afternoon. 


2. Discharge: [facility] discharge tomorrow, Covid negative today.

## 2020-12-09 RX ADMIN — ISOSORBIDE MONONITRATE SCH: 60 TABLET, FILM COATED, EXTENDED RELEASE ORAL at 08:40

## 2020-12-09 NOTE — [id number]
**Discharge Summary





- Hospital Course


HPI Initial Comments: 





Sylwia presented to the ED last night due to constipation for past 3-5 days 

and dry cough. She states she has chronic chest pain, feels more short of 

breath, has not turned up her oxygen, which she wears at home but felt like she 

should. Covid was negative on admission. She also c/o nausea but no vomiting. 

Denies any abdominal pain, fever,chills. Her son who is his POA and is in 

[place] right now want her to stay in the hospital and find nursing home pl

acement for her. The son said that he have a hard time looking for a nursing 

home because of the Covid pandemic. She has chronic systolic congestive heart 

failure, last echo 7/17/2020 showed EF of 30%, her last Cardiology appointment 

this summer she declined any stress testing or angiogram and had discussed 

hospice. They increased her Imdur to 90 mg in am and 30 mg in the afternoon and 

also started her on Ranolazine 500 mg bid and recently increased to 1000 mg bid 

but patient had not started the new dose yet per home health. On coumadin for 

chronic atrial fibrillation, was found to have supratherapeutic INR of 6.02 in 

ER, BNP 40717, elevated AST/, 444 respectively, Creatinine 2.7 on 

admission, 2.6 this morning. Flat & Upright abdominal x-ray done in ER. 

Corrected sodium for hyperglycemia was 130 this morning. 


Diagnosis: Stroke: No





- Discharge Data


Discharge Date: 12/09/20 ([facility])


Discharge Disposition: DC/Tfer to SNF 03


Condition: Stable





- Referral to Home Health


Date of Face to Face Encounter: 12/09/20


Reason for Homebound Status: [facility]


Primary Care Physician: 


PCP None





Skilled Need: Hospice for end stage CHF





- Discharge Diagnosis/Problem(s)


(1) CHF exacerbation


SNOMED Code(s): 531559623, 31749978004921


   ICD Code: I50.9 - HEART FAILURE, UNSPECIFIED   Status: Acute   Current Visit:

No   Problem Details: Covid was negative on admission and negative again today.


EF 30% on last echo at [place] 7/17/2018, will not repeat echo as that would not

change treatment plan.   


Qualifiers: 


   Heart failure type: systolic   Qualified Code(s): I50.23 - Acute on chronic 

systolic (congestive) heart failure   





(2) Elevated brain natriuretic peptide (BNP) level


SNOMED Code(s): 742378286, 457101888


   ICD Code: R79.89 - OTHER SPECIFIED ABNORMAL FINDINGS OF BLOOD CHEMISTRY   

Status: Acute   Current Visit: No   Problem Details: Discontinued Lasix due to 

decreased oral intake. Discharge today to [facility] will be admitted to 

Hospice there.   





(3) LFT elevation


SNOMED Code(s): 339457201, 092858037


   ICD Code: R79.89 - OTHER SPECIFIED ABNORMAL FINDINGS OF BLOOD CHEMISTRY   

Status: Acute   Current Visit: Yes   





(4) Acute renal insufficiency


SNOMED Code(s): 481730243


   ICD Code: N28.9 - DISORDER OF KIDNEY AND URETER, UNSPECIFIED   Status: Acute 

 Current Visit: No   





(5) Declining functional status


SNOMED Code(s): 083459749258026


   ICD Code: R53.81 - OTHER MALAISE   Status: Acute   Current Visit: No   

Problem Details: will be going to [facility] for end of life care so PT/OT 

no longer needed.   





(6) Weakness


SNOMED Code(s): 35464886


   ICD Code: R53.1 - WEAKNESS   Status: Acute   Current Visit: No   





(7) Hyponatremia


SNOMED Code(s): 48434303


   ICD Code: E87.1 - HYPO-OSMOLALITY AND HYPONATREMIA   Status: Acute   Current 

Visit: No   





(8) Ischemic cardiomyopathy


SNOMED Code(s): 144681651


   ICD Code: I25.5 - ISCHEMIC CARDIOMYOPATHY   Status: Chronic   Current Visit: 

No   





(9) Palliative care status


SNOMED Code(s): 639665216


   ICD Code: Z51.5 - ENCOUNTER FOR PALLIATIVE CARE   Status: Chronic   Current 

Visit: No   





(10) Afib


SNOMED Code(s): 99718990


   ICD Code: I48.91 - UNSPECIFIED ATRIAL FIBRILLATION   Status: Chronic   

Current Visit: No   Problem Details: INR 2.65, Coumadin per pharmacy, hold dose 

today.   


Qualifiers: 


   Atrial fibrillation type: longstanding persistent   Qualified Code(s): I48.11

- Longstanding persistent atrial fibrillation   





(11) Chronic systolic congestive heart failure, NYHA class 3


SNOMED Code(s): 979532093, 238409252, 201153047


   ICD Code: I50.22 - CHRONIC SYSTOLIC (CONGESTIVE) HEART FAILURE   Status: 

Chronic   Current Visit: No   





(12) Constipation


SNOMED Code(s): 42033516


   ICD Code: K59.00 - CONSTIPATION, UNSPECIFIED   Status: Chronic   Current 

Visit: Yes   





(13) Parkinson disease


SNOMED Code(s): 26528517


   ICD Code: G20 - PARKINSON'S DISEASE   Status: Chronic   Current Visit: No   





(14) Chronic stable angina


SNOMED Code(s): 027263782


   ICD Code: I20.8 - OTHER FORMS OF ANGINA PECTORIS   Status: Chronic   Current 

Visit: Yes   





(15) Diabetes mellitus type 2, diet-controlled


SNOMED Code(s): 540010023465894, 944759389178767


   ICD Code: E11.9 - TYPE 2 DIABETES MELLITUS WITHOUT COMPLICATIONS   Status: 

Chronic   Current Visit: No   





(16) Hyperkalemia


SNOMED Code(s): 43306694


   ICD Code: E87.5 - HYPERKALEMIA   Status: Resolved   Current Visit: Yes   





(17) Elevated INR


SNOMED Code(s): 082732131


   ICD Code: R79.1 - ABNORMAL COAGULATION PROFILE   Status: Resolved   Current 

Visit: No   Problem Details:     





- Patient Summary/Data


Consults: 


                                  Consultations





12/04/20 09:01


OT Evaluation and Treatment [CONS] Routine 


   Please Evaluate and Treat.


   OT Reason for Consult: ADL's


   This query below is only for informational purposes and is not editable.


   Admission Diagnosis/Problem: Dehydration


PT Evaluation and Treatment [CONS] Routine 


   Please Evaluate and Treat.


   PT Reason for Consult: Ambulation


   This query below is only for informational purposes and is not editable.


   Admission Diagnosis/Problem: Dehydration











Hospital Course: 





Sylwia was admitted for supratherapeutic INR, weakness, Acute renal 

insufficiency, dehydration, was also found to be in CHF exacerbation. BNP 96855.

 Had elevated liver functions, continued to elevate on repeat lab. Chest x-ray 

showed some fluid in fissure with pleural effusions. CT chest/abdomen/pelvis 

revealed pulmonary edema, small pleural effusions, no consolidation or 

infiltrate, ascites, abdominal wall edema all consistent with CHF exacerbation. 

Elevated liver functions due to liver congestion secondary to CHF. Urine output 

was hard to assess initially as she was incontinent so Roland was placed. She 

initially had received NS IV fluids were discontinued when Chest x-ray reviewed,

 patient was drinking around 900 ml a day, Cr improved to 2.1. Had restarted NS 

at 50 ml/hr to keep vein open and switched to oral Lasix to try and not give too

 much fluids for her heart but a little bit more than oral intake for her 

kidneys. Fluids were stopped in the middle of night on Saturday as she was 

having more coarse crackles throughout. Lasix changed to IV, having 665 ml out. 

Cr came up to 2.7. INR 3.65 yesterday so dose held, today's INR pending. Changed

 to oral Lasix yesterday as her oral intake decreased and had 465 ml out for the

 day. She has been sleeping a lot more but more awake and drinking in the 

afternoons and evenings. She has been utilizing Tylenol twice a day scheduled 

with 650 mg as needed, which she last used on Thursday evening. Discussed with 

family her condition daily over the weekend, recommended end of life care, they 

wanted to continue to diuresis and treat her until she could be moved to another

 facility so she could go on hospice but have family come visit as they were not

 able to care for her at her apartment with hospice. [facility] accepted 

the patient and hospice is able to admit to their care today.  





- Patient Instructions


Diet: Regular Diet as Tolerated


Activity: Bedrest


Other/Special Instructions: Discharge to [facility] for end of life care. 

Admission to Hospice today.





- Discharge Plan


*PRESCRIPTION DRUG MONITORING PROGRAM REVIEWED*: Not Applicable


*COPY OF PRESCRIPTION DRUG MONITORING REPORT IN PATIENT RANJIT: Not Applicable


Home Medications: 


                                    Home Meds





Docusate Sodium 100 mg PO BID 12/31/19 [History]


Fluocinolone Acetonide 4 drop EARBOTH ASDIRECTED PRN 12/31/19 [History]


Metoprolol Succinate [Toprol XL 100mg] 100 mg PO BEDTIME 12/31/19 [History]


Nitroglycerin 0.4 mg SL Q5M PRN 12/31/19 [History]


Triamcinolone Acetonide [Triamcinolone Acetonide 0.1% Crm] 1 applic TOP BID PRN 

12/31/19 [History]


Warfarin [Coumadin] 5 mg PO SUTUWETHFRSA 12/31/19 [History]


Acetaminophen [Tylenol Extra Strength] 1,000 mg PO BEDTIME 08/13/20 [History]


Acetaminophen [Tylenol Extra Strength] 500 mg PO DAILY  tablet 08/17/20 [Rx]


bisacodyL [Dulcolax] 5 mg PO DAILY PRN  tablet 08/17/20 [Rx]


Warfarin [Coumadin] 2.5 mg PO MO 09/26/20 [History]


Isosorbide Mononitrate [Imdur] 30 mg PO DAILY@1600 12/03/20 [History]


Acetaminophen [Tylenol] 650 mg PO Q4H PRN  tablet 12/09/20 [Rx]


Isosorbide Mononitrate [Imdur] 60 mg PO DAILY #0 12/09/20 [Rx]


Ranolazine [Ranolazine ER] 1,000 mg PO BID #0 12/09/20 [Rx]


Warfarin Sliding Scale [Coumadin Sliding Scale] 1 each PO ASDIRECTED  tablet 

12/09/20 [Rx]








Oxygen Therapy Mode: Nasal Cannula


Oxygen Flow Rate (L/min): 1


Maintain SPO2% less than: 94


Maintain SpO2% greater than: 88


Patient Handouts:  Constipation, Adult


Forms:  ED Department Discharge


Referrals: 


Goldy Whitney MD [Physician] - 





- Discharge Summary/Plan Comment


DC Time >30 min.: No





- General Info


Date of Service: 12/09/20


Subjective Update: 





Sylwia is sitting up in the wheelchair, reclined this morning. States she 

feels weak but wants to get out of here. Has some nausea but wanted her teeth 

back in. Has not received any Zofran since admission. Held Imdur this morning 

for her blood pressure. 


Functional Status: Reports: Pain Controlled





- Patient Data


Vitals - Most Recent: 


                                Last Vital Signs











Temp  97.4 F   12/09/20 00:00


 


Pulse  68   12/09/20 00:00


 


Resp  20   12/09/20 00:00


 


BP  112/66   12/09/20 00:00


 


Pulse Ox  96   12/09/20 00:00











Weight - Most Recent: 181 lb


I&O - Last 24 hours: 


                                 Intake & Output











 12/08/20 12/09/20 12/09/20





 22:59 06:59 14:59


 


Intake Total 75 100 


 


Output Total 50 75 


 


Balance 25 25 











Lab Results - Last 24 hrs: 


                         Laboratory Results - last 24 hr











  12/08/20 Range/Units





  07:56 


 


SARS-CoV-2 RNA (TOD)  Negative  (NEGATIVE)  











Med Orders - Current: 


                               Current Medications





Acetaminophen (Tylenol Extra Strength)  500 mg PO DAILY [facility]WAB[facility]


   Last Admin: 12/08/20 14:59 Dose:  Not Given


   Documented by: 


Acetaminophen (Tylenol)  650 mg PO Q4H PRN


   PRN Reason: Pain


   Last Admin: 12/03/20 23:18 Dose:  650 mg


   Documented by: 


Acetaminophen (Tylenol Extra Strength)  1,000 mg PO BEDTIME [facility]


   Last Admin: 12/08/20 20:53 Dose:  1,000 mg


   Documented by: 


Bisacodyl (Dulcolax)  5 mg PO DAILY PRN


   PRN Reason: Constipation


Docusate Sodium (Colace)  100 mg PO BID [facility]


   Last Admin: 12/08/20 20:53 Dose:  100 mg


   Documented by: 


Isosorbide Mononitrate (Imdur)  30 mg PO DAILY@1600 [facility]


   Last Admin: 12/08/20 17:00 Dose:  Not Given


   Documented by: 


Isosorbide Mononitrate (Imdur)  60 mg PO DAILY [facility]


   Last Admin: 12/08/20 14:57 Dose:  Not Given


   Documented by: 


Metoprolol Succinate (Toprol Xl)  100 mg PO BEDTIME [facility]


   Last Admin: 12/08/20 20:46 Dose:  Not Given


   Documented by: 


Nitroglycerin (Nitrostat)  0.4 mg SL Q5M PRN


   PRN Reason: Chest Pain


Non-Formulary Medication (Fluocinolone Acetonide [Fluocinolone Acetonide])  4 

drop EARBOTH ASDIRECTED PRN


   PRN Reason: Dizziness


Ondansetron HCl (Zofran)  4 mg IVPUSH Q4H PRN


   PRN Reason: Nausea/Vomiting


Polyethylene Glycol (Miralax)  17 gm PO DAILY PRN


   PRN Reason: Constipation


Ranolazine (Ranexa)  1,000 mg PO BID [facility]


   Last Admin: 12/08/20 20:51 Dose:  Not Given


   Documented by: 


Senna/Docusate Sodium (Senna Plus)  1 tab PO BID PRN


   PRN Reason: Constipation


Sodium Chloride (Saline Flush)  10 ml FLUSH ASDIRECTED PRN


   PRN Reason: flush med


   Last Admin: 12/07/20 14:25 Dose:  10 ml


   Documented by: 


Triamcinolone Acetonide (Triamcinolone Acetonide 0.1% Crm)  0 gm TOP BID PRN


   PRN Reason: Rash


Warfarin Sodium (Coumadin Sliding Scale)  1 each PO ASDIRECTED [facility]





Discontinued Medications





Ascorbic Acid (Vitamin C)  1,000 mg PO BEDTIME [facility]


   Last Admin: 12/07/20 21:42 Dose:  Not Given


   Documented by: 


Cholecalciferol (Vitamin D3)  25 mcg PO BEDTIME [facility]


   Last Admin: 12/07/20 21:43 Dose:  Not Given


   Documented by: 


Coenzyme Q10 (Coenzyme Q10)  100 mg PO QPM [facility]


   Last Admin: 12/07/20 16:13 Dose:  100 mg


   Documented by: 


Docusate Sodium (Colace)  100 mg PO BID PRN


   PRN Reason: Constipation


Furosemide (Lasix)  20 mg IVPUSH ONETIME ONE


   Stop: 12/04/20 09:01


   Last Admin: 12/04/20 09:43 Dose:  20 mg


   Documented by: 


Furosemide (Lasix)  40 mg IVPUSH DAILY [facility]


Furosemide (Lasix)  40 mg IVPUSH NOW ONE


   Stop: 12/04/20 16:19


   Last Admin: 12/04/20 16:56 Dose:  40 mg


   Documented by: 


Furosemide (Lasix)  20 mg PO DAILY@1400 [facility]


   Last Admin: 12/05/20 15:03 Dose:  20 mg


   Documented by: 


Furosemide (Lasix)  60 mg PO DAILY [facility]


   Last Admin: 12/06/20 09:39 Dose:  60 mg


   Documented by: 


Furosemide (Lasix)  20 mg IVPUSH DAILY@1400 [facility]


   Last Admin: 12/07/20 14:25 Dose:  20 mg


   Documented by: 


Furosemide (Lasix)  40 mg IVPUSH DAILY [facility]


   Last Admin: 12/07/20 09:08 Dose:  40 mg


   Documented by: 


Furosemide (Lasix)  60 mg IVPUSH NOW ONE


   Stop: 12/06/20 16:53


   Last Admin: 12/06/20 16:57 Dose:  60 mg


   Documented by: 


Furosemide (Lasix)  40 mg PO DAILY [facility]


   Last Admin: 12/08/20 14:58 Dose:  Not Given


   Documented by: 


Furosemide (Lasix)  20 mg PO DAILY@1400 [facility]


   Last Admin: 12/08/20 14:59 Dose:  Not Given


   Documented by: 


Glipizide (Glucotrol)  2.5 mg PO DAILY [facility]


   Last Admin: 12/08/20 14:57 Dose:  Not Given


   Documented by: 


Sodium Chloride (Normal Saline)  1,000 mls @ 50 mls/hr IV ASDIRECTED [facility]


   Last Admin: 12/05/20 09:20 Dose:  50 mls/hr


   Documented by: 


Phytonadione 5 mg/ Sodium (Chloride)  50.5 mls @ 100 mls/hr IV NOW ONE


   Stop: 12/03/20 20:17


   Last Admin: 12/03/20 20:38 Dose:  100 mls/hr


   Documented by: 


Isosorbide Mononitrate (Imdur)  90 mg PO DAILY [facility]


   Last Admin: 12/05/20 09:28 Dose:  Not Given


   Documented by: 


Isosorbide Mononitrate (Imdur)  60 mg PO DAILY [facility]


   Last Admin: 12/07/20 09:04 Dose:  60 mg


   Documented by: 


Multivitamins/Minerals/Vitamin C (Tab-A-Daniel)  1 tab PO DAILY [facility]


   Last Admin: 12/07/20 09:04 Dose:  1 tab


   Documented by: 


Non-Formulary Medication (Peppermint Oil Cap)  1 cap PO BEDTIME [facility]


   Last Admin: 12/03/20 20:52 Dose:  Not Given


   Documented by: 


Non-Formulary Medication (Ranolazine [Ranolazine Er])  500 mg PO BID [facility]


   Last Admin: 12/04/20 15:53 Dose:  Not Given


   Documented by: 


Ranolazine Er 500mg  0 each PO BID [facility]


   Last Admin: 12/04/20 13:31 Dose:  2 each


   Documented by: 


Polyethylene Glycol (Miralax)  17 gm PO DAILY [facility]


   Last Admin: 12/08/20 14:58 Dose:  Not Given


   Documented by: 


Sitagliptin Phosphate (Januvia)  25 mg PO DAILY [facility]


   Last Admin: 12/08/20 14:57 Dose:  Not Given


   Documented by: 


Warfarin Sodium (Coumadin)  2.5 mg PO ONETIME ONE


   Stop: 12/04/20 16:01


   Last Admin: 12/04/20 16:48 Dose:  2.5 mg


   Documented by: 


Warfarin Sodium (Coumadin)  4 mg PO ONETIME ONE


   Stop: 12/05/20 16:01


   Last Admin: 12/05/20 16:55 Dose:  Not Given


   Documented by: 


Warfarin Sodium (Coumadin)  4 mg PO ONETIME ONE


   Stop: 12/05/20 16:46


   Last Admin: 12/05/20 17:09 Dose:  4 mg


   Documented by: 


Warfarin Sodium 1 mg/ Warfarin (Sodium 5 mg)  6 mg PO ONETIME ONE


   Stop: 12/06/20 17:01


   Last Admin: 12/06/20 16:54 Dose:  6 mg


   Documented by: 


Warfarin Sodium (Coumadin) Confirm Administered Dose 1 mg .ROUTE .STK-MED ONE


   Stop: 12/06/20 16:50


   Last Admin: 12/06/20 16:57 Dose:  Not Given


   Documented by: 


Warfarin Sodium (Coumadin) Confirm Administered Dose 5 mg .ROUTE .STK-MED ONE


   Stop: 12/06/20 16:50


   Last Admin: 12/06/20 16:57 Dose:  Not Given


   Documented by: 


Warfarin Sodium (Coumadin)  2.5 mg PO ONETIME ONE


   Stop: 12/07/20 16:01


   Last Admin: 12/07/20 16:12 Dose:  2.5 mg


   Documented by: 











- Exam


General: Reports: Alert, Oriented, Cooperative, No Acute Distress


Lungs: Reports: Clear to Auscultation, Normal Respiratory Effort, Decreased 

Breath Sounds (bibasilar)


Cardiovascular: Reports: Regular Rate, Irregular Rhythm


GI/Abdominal Exam: Soft, No Distention, Tender (epigastric), Abnormal Bowel 

Sounds (hypoactive).  No: Guarding, Rigid, Rebound


 (Female) Exam: Deferred